# Patient Record
Sex: MALE | Race: WHITE | NOT HISPANIC OR LATINO | Employment: OTHER | URBAN - METROPOLITAN AREA
[De-identification: names, ages, dates, MRNs, and addresses within clinical notes are randomized per-mention and may not be internally consistent; named-entity substitution may affect disease eponyms.]

---

## 2017-03-29 DIAGNOSIS — E78.5 HYPERLIPIDEMIA, UNSPECIFIED HYPERLIPIDEMIA TYPE: Primary | ICD-10-CM

## 2017-04-05 ENCOUNTER — OFFICE VISIT (OUTPATIENT)
Dept: CARDIOLOGY | Facility: CLINIC | Age: 82
End: 2017-04-05
Payer: MEDICARE

## 2017-04-05 ENCOUNTER — CLINICAL SUPPORT (OUTPATIENT)
Dept: CARDIOLOGY | Facility: CLINIC | Age: 82
End: 2017-04-05
Payer: MEDICARE

## 2017-04-05 VITALS
BODY MASS INDEX: 23.35 KG/M2 | SYSTOLIC BLOOD PRESSURE: 154 MMHG | HEART RATE: 74 BPM | HEIGHT: 70 IN | DIASTOLIC BLOOD PRESSURE: 80 MMHG | WEIGHT: 163.13 LBS

## 2017-04-05 DIAGNOSIS — Z95.2 S/P AVR (AORTIC VALVE REPLACEMENT): Primary | ICD-10-CM

## 2017-04-05 DIAGNOSIS — E78.5 HYPERLIPIDEMIA, UNSPECIFIED HYPERLIPIDEMIA TYPE: ICD-10-CM

## 2017-04-05 DIAGNOSIS — E78.00 PURE HYPERCHOLESTEROLEMIA: ICD-10-CM

## 2017-04-05 PROCEDURE — 1159F MED LIST DOCD IN RCRD: CPT | Mod: S$GLB,,, | Performed by: INTERNAL MEDICINE

## 2017-04-05 PROCEDURE — 1157F ADVNC CARE PLAN IN RCRD: CPT | Mod: S$GLB,,, | Performed by: INTERNAL MEDICINE

## 2017-04-05 PROCEDURE — 99214 OFFICE O/P EST MOD 30 MIN: CPT | Mod: S$GLB,,, | Performed by: INTERNAL MEDICINE

## 2017-04-05 PROCEDURE — 93000 ELECTROCARDIOGRAM COMPLETE: CPT | Mod: S$GLB,,, | Performed by: NUCLEAR MEDICINE

## 2017-04-05 PROCEDURE — 99999 PR PBB SHADOW E&M-EST. PATIENT-LVL III: CPT | Mod: PBBFAC,,, | Performed by: INTERNAL MEDICINE

## 2017-04-05 PROCEDURE — 1160F RVW MEDS BY RX/DR IN RCRD: CPT | Mod: S$GLB,,, | Performed by: INTERNAL MEDICINE

## 2017-04-05 NOTE — MR AVS SNAPSHOT
[facility]  [id number] [facility] [address]  Phone: [phone]  Fax: [phone]                  Juan Castellanos   2017 10:40 AM   Office Visit    Description:  Male : 1932   Provider:  Dallin Bueno MD   Department:  [facility]           Reason for Visit     Numbness           Diagnoses this Visit        Comments    S/P AVR (aortic valve replacement)    -  Primary     Pure hypercholesterolemia                To Do List           Future Appointments        Provider Department Dept Phone    2017 9:45 AM Haley Spears OD [facility] - Ophthalmology [phone]      Goals (5 Years of Data)     None      Follow-Up and Disposition     Return in about 1 year (around 2018).      [facility] On Call     [facility] On Call Nurse Care Line -  Assistance  Unless otherwise directed by your provider, please contact [facility], our nurse care line that is available for  assistance.     Registered nurses in the [facility] provide: appointment scheduling, clinical advisement, health education, and other advisory services.  Call: [phone] (toll free)               Medications           Message regarding Medications     Verify the changes and/or additions to your medication regime listed below are the same as discussed with your clinician today.  If any of these changes or additions are incorrect, please notify your healthcare provider.             Verify that the below list of medications is an accurate representation of the medications you are currently taking.  If none reported, the list may be blank. If incorrect, please contact your healthcare provider. Carry this list with you in case of emergency.           Current Medications     ACETAMINOPHEN (TYLENOL ORAL) Take by mouth as needed.    aspirin (ECOTRIN) 81 MG EC tablet Take 81 mg by mouth once daily.    CHLORPHENIRAMINE MALEATE (CHLORTABS ORAL) Take by mouth once daily.     "cholecalciferol, vitamin D3, 1,000 unit capsule Take 1,000 Units by mouth 2 (two) times daily.    esomeprazole (NEXIUM) 40 MG capsule Take 40 mg by mouth before dinner.     fluticasone (FLONASE) 50 mcg/actuation nasal spray 1 spray by Each Nare route once daily.    hydrocortisone 2.5 % ointment Apply bid for 1-2 weeks    levothyroxine (SYNTHROID) 88 MCG tablet Take 100 mcg by mouth once daily.     MULTIVIT-IRON-MIN-FOLIC ACID 3,500-18-0.4 UNIT-MG-MG ORAL CHEW Take by mouth.    rosuvastatin (CRESTOR) 20 MG tablet Take 20 mg by mouth once daily.    tramadol (ULTRAM) 50 mg tablet Take 50 mg by mouth every 6 (six) hours as needed.    valacyclovir (VALTREX) 1000 MG tablet TAKE ONE TABLET BY MOUTH TWICE DAILY FOR 5 DAYS           Clinical Reference Information           Your Vitals Were     BP Pulse Height Weight BMI    154/80 (BP Location: Right arm) 74 5' 10" (1.778 m) 74 kg (163 lb 2.3 oz) 23.41 kg/m2      Blood Pressure          Most Recent Value    BP  (!)  154/80      Allergies as of 4/5/2017     Tomato (Solanum Lycopersicum)    Grape    [place]      Immunizations Administered on Date of Encounter - 4/5/2017     None      [facility] Sign-Up     Activating your [facility] account is as easy as 1-2-3!     1) Visit [email or web address], select Sign Up Now, enter this activation code and your date of birth, then select Next.  [email or web address]  Expires: 5/20/2017 11:00 AM      2) Create a username and password to use when you visit [facility] in the future and select a security question in case you lose your password and select Next.    3) Enter your e-mail address and click Sign Up!    Additional Information  If you have questions, please e-mail [email or web address] or call [phone] to talk to our [facility] staff. Remember, [facility] is NOT to be used for urgent needs. For medical emergencies, dial 911.         Language Assistance Services     ATTENTION: Language assistance services are available, free of charge. Please " call [phone].      ATENCIÓN: Si habla español, tiene a salamanca disposición servicios gratuitos de asistencia lingüística. Llame al [phone].     CHÚ Ý: N?u b?n nói Ti?ng Vi?t, có các d?ch v? h? tr? ngôn ng? mi?n phí dành cho b?n. G?i s? [phone].         [facility] complies with applicable Federal civil rights laws and does not discriminate on the basis of race, color, national origin, age, disability, or sex.

## 2017-11-13 ENCOUNTER — OFFICE VISIT (OUTPATIENT)
Dept: OPHTHALMOLOGY | Facility: CLINIC | Age: 82
End: 2017-11-13
Payer: MEDICARE

## 2017-11-13 DIAGNOSIS — H52.03 HYPEROPIA WITH ASTIGMATISM AND PRESBYOPIA, BILATERAL: ICD-10-CM

## 2017-11-13 DIAGNOSIS — Z13.5 SCREENING FOR GLAUCOMA: ICD-10-CM

## 2017-11-13 DIAGNOSIS — H52.4 HYPEROPIA WITH ASTIGMATISM AND PRESBYOPIA, BILATERAL: ICD-10-CM

## 2017-11-13 DIAGNOSIS — H52.203 HYPEROPIA WITH ASTIGMATISM AND PRESBYOPIA, BILATERAL: ICD-10-CM

## 2017-11-13 DIAGNOSIS — H04.123 DRY EYES, BILATERAL: ICD-10-CM

## 2017-11-13 PROCEDURE — 92014 COMPRE OPH EXAM EST PT 1/>: CPT | Mod: S$GLB,,, | Performed by: OPTOMETRIST

## 2017-11-13 PROCEDURE — 99999 PR PBB SHADOW E&M-EST. PATIENT-LVL II: CPT | Mod: PBBFAC,,, | Performed by: OPTOMETRIST

## 2017-11-13 PROCEDURE — 92015 DETERMINE REFRACTIVE STATE: CPT | Mod: S$GLB,,, | Performed by: OPTOMETRIST

## 2017-11-13 NOTE — PROGRESS NOTES
HPI     Eye Exam    Additional comments: yearly exam.            Comments   Last exam 11/07/2016 with slc. Wears glasses full time, last updated about   2 years ago. Uses allergy drops prn, almost every day. Pt states he has   dry skin and wonders if it can affect the moisture in his eyes    1. NS OU  2. hyperopia       Last edited by June Freed MA on 11/13/2017  9:36 AM. (History)            Assessment /Plan     For exam results, see Encounter Report.    Cataract, nuclear    Dry eyes, bilateral    Screening for glaucoma    Hyperopia with astigmatism and presbyopia, bilateral      Cataract(s) not yet affecting activities of daily living, therefore, surgery consult is not yet indicated.  Artificial tears as needed..  Glaucoma screening and fundus exam normal.  Updated glasses prescription.  Return to clinic 1 yr.

## 2018-06-19 DIAGNOSIS — E78.5 HYPERLIPIDEMIA, UNSPECIFIED HYPERLIPIDEMIA TYPE: Primary | ICD-10-CM

## 2018-06-20 ENCOUNTER — CLINICAL SUPPORT (OUTPATIENT)
Dept: CARDIOLOGY | Facility: CLINIC | Age: 83
End: 2018-06-20
Payer: MEDICARE

## 2018-06-20 ENCOUNTER — OFFICE VISIT (OUTPATIENT)
Dept: CARDIOLOGY | Facility: CLINIC | Age: 83
End: 2018-06-20
Payer: MEDICARE

## 2018-06-20 VITALS
DIASTOLIC BLOOD PRESSURE: 74 MMHG | WEIGHT: 163 LBS | HEART RATE: 68 BPM | HEIGHT: 70 IN | SYSTOLIC BLOOD PRESSURE: 190 MMHG | BODY MASS INDEX: 23.34 KG/M2

## 2018-06-20 DIAGNOSIS — E78.5 HYPERLIPIDEMIA, UNSPECIFIED HYPERLIPIDEMIA TYPE: ICD-10-CM

## 2018-06-20 DIAGNOSIS — Z95.2 S/P AVR (AORTIC VALVE REPLACEMENT): Primary | ICD-10-CM

## 2018-06-20 DIAGNOSIS — E78.00 PURE HYPERCHOLESTEROLEMIA: ICD-10-CM

## 2018-06-20 PROCEDURE — 99999 PR PBB SHADOW E&M-EST. PATIENT-LVL III: CPT | Mod: PBBFAC,,, | Performed by: PHYSICIAN ASSISTANT

## 2018-06-20 PROCEDURE — 99214 OFFICE O/P EST MOD 30 MIN: CPT | Mod: S$GLB,,, | Performed by: PHYSICIAN ASSISTANT

## 2018-06-20 PROCEDURE — 93000 ELECTROCARDIOGRAM COMPLETE: CPT | Mod: S$GLB,,, | Performed by: INTERNAL MEDICINE

## 2018-06-20 NOTE — PROGRESS NOTES
Subjective:    Patient ID:  Juan Castellanos is a 85 y.o. male who presents for follow-up of yearly follow-up      HPI   Mr. Castellanos is an 85 year old male patient with a PMHx of hypercholesterolemia and s/p AVR who presents today for his yearly follow-up. Patient returns today and states he is feeling well. Lost his wife in April and has been grieving. Cardiac wise, seems stable. Denies any SOB, chest pain, or other anginal signs or symptoms. No palpitations, lightheadedness, dizziness, near syncope, or syncope. No s/s of CHF. Patient reports compliance with his medications. Followed by PCP at [facility], who checks labs. BP elevated today however patient was stressed. EKG today shows NSR, normal EKG.    Review of Systems   Constitution: Negative for chills, decreased appetite, fever, weakness and malaise/fatigue.   HENT: Negative for congestion, hoarse voice and sore throat.    Eyes: Negative for blurred vision and discharge.   Cardiovascular: Negative for chest pain, claudication, cyanosis, dyspnea on exertion, irregular heartbeat, leg swelling, near-syncope, orthopnea, palpitations and paroxysmal nocturnal dyspnea.   Respiratory: Negative for cough, hemoptysis, shortness of breath, snoring, sputum production and wheezing.    Endocrine: Negative for cold intolerance and heat intolerance.   Hematologic/Lymphatic: Negative for bleeding problem. Does not bruise/bleed easily.   Skin: Negative for rash.   Musculoskeletal: Negative for arthritis, back pain, joint pain, joint swelling, muscle cramps, muscle weakness and myalgias.   Gastrointestinal: Negative for abdominal pain, constipation, diarrhea, heartburn, melena and nausea.   Genitourinary: Negative for hematuria.   Neurological: Negative for dizziness, focal weakness, headaches, light-headedness, loss of balance, numbness, paresthesias and seizures.   Psychiatric/Behavioral: Negative for memory loss. The patient does not have insomnia.    Allergic/Immunologic: Negative  "for hives.       BP (!) 190/74 (BP Method: Small (Manual))   Pulse 68   Ht 5' 10" (1.778 m)   Wt 73.9 kg (163 lb)   BMI 23.39 kg/m²     Objective:    Physical Exam   Constitutional: He is oriented to person, place, and time. He appears well-developed and well-nourished. No distress.   HENT:   Head: Normocephalic and atraumatic.   Eyes: Pupils are equal, round, and reactive to light. Right eye exhibits no discharge. Left eye exhibits no discharge.   Neck: Neck supple. No JVD present. No thyromegaly present.   Cardiovascular: Normal rate, regular rhythm, S1 normal and S2 normal.    Murmur heard.  Pulmonary/Chest: Effort normal and breath sounds normal. No respiratory distress. He has no wheezes. He has no rales.   Abdominal: Soft. Bowel sounds are normal. He exhibits no distension. There is no tenderness. There is no rebound.   Musculoskeletal: He exhibits no edema.   Neurological: He is alert and oriented to person, place, and time.   Skin: Skin is warm and dry. He is not diaphoretic. No erythema.   Psychiatric: He has a normal mood and affect. His behavior is normal. Thought content normal.   Nursing note and vitals reviewed.      Carotid U/S  CONCLUSIONS   There is 20 - 39% right Internal Carotid stenosis.  There is 20 - 39% left Internal Carotid stenosis.    2D Echo CONCLUSIONS     1 - Concentric remodeling.     2 - Normal left ventricular systolic function (EF 55-60%).     3 - Normal left ventricular diastolic function.     4 - Normal right ventricular systolic function .     5 - The estimated PA systolic pressure is 30 mmHg.     6 - Aortic valve prosthesis, effective prosthetic valve area corrected for BSA is 0.71 cm2.     7 - Trivial mitral regurgitation.     8 - Trivial tricuspid regurgitation.   Assessment:       1. S/P AVR (aortic valve replacement)    2. Pure hypercholesterolemia       Doing clinically well. No cardiac complaints. Stable, no angina, no s/s of CHF. BP elevated secondary to stress, he " will monitor at home and call me if it remains elevated.   Plan:     -Continue current medical management and risk factor modification  -Cardiac, low salt diet  -Continue active lifestyle  -PCP gets labs  -2d echo next 4-6 weeks  -RTC 1 year with Dr. Bueno with EKG    Chart reviewed. Dr. Bueno agrees with plan as outlined above.

## 2018-07-03 ENCOUNTER — PATIENT MESSAGE (OUTPATIENT)
Dept: CARDIOLOGY | Facility: CLINIC | Age: 83
End: 2018-07-03

## 2018-07-03 DIAGNOSIS — E78.00 PURE HYPERCHOLESTEROLEMIA: Primary | ICD-10-CM

## 2018-07-24 ENCOUNTER — CLINICAL SUPPORT (OUTPATIENT)
Dept: CARDIOLOGY | Facility: CLINIC | Age: 83
End: 2018-07-24
Attending: PHYSICIAN ASSISTANT
Payer: MEDICARE

## 2018-07-24 DIAGNOSIS — Z95.2 S/P AVR (AORTIC VALVE REPLACEMENT): ICD-10-CM

## 2018-07-24 LAB
ESTIMATED PA SYSTOLIC PRESSURE: 30.67
MITRAL VALVE MOBILITY: NORMAL
MITRAL VALVE REGURGITATION: NORMAL
RETIRED EF AND QEF - SEE NOTES: 60 (ref 55–65)
TRICUSPID VALVE REGURGITATION: NORMAL

## 2018-07-24 PROCEDURE — 93306 TTE W/DOPPLER COMPLETE: CPT | Mod: S$GLB,,, | Performed by: NUCLEAR MEDICINE

## 2018-07-25 ENCOUNTER — TELEPHONE (OUTPATIENT)
Dept: CARDIOLOGY | Facility: HOSPITAL | Age: 83
End: 2018-07-25

## 2018-09-10 ENCOUNTER — PATIENT MESSAGE (OUTPATIENT)
Dept: CARDIOLOGY | Facility: CLINIC | Age: 83
End: 2018-09-10

## 2018-09-11 ENCOUNTER — OFFICE VISIT (OUTPATIENT)
Dept: CARDIOLOGY | Facility: CLINIC | Age: 83
End: 2018-09-11
Payer: MEDICARE

## 2018-09-11 VITALS
DIASTOLIC BLOOD PRESSURE: 56 MMHG | SYSTOLIC BLOOD PRESSURE: 122 MMHG | WEIGHT: 162.5 LBS | HEART RATE: 68 BPM | HEIGHT: 70 IN | BODY MASS INDEX: 23.26 KG/M2

## 2018-09-11 DIAGNOSIS — Z95.2 S/P AVR (AORTIC VALVE REPLACEMENT): Primary | ICD-10-CM

## 2018-09-11 DIAGNOSIS — I50.32 CHRONIC DIASTOLIC HF (HEART FAILURE): ICD-10-CM

## 2018-09-11 DIAGNOSIS — E78.00 PURE HYPERCHOLESTEROLEMIA: ICD-10-CM

## 2018-09-11 PROCEDURE — 99213 OFFICE O/P EST LOW 20 MIN: CPT | Mod: PBBFAC | Performed by: NURSE PRACTITIONER

## 2018-09-11 PROCEDURE — 99214 OFFICE O/P EST MOD 30 MIN: CPT | Mod: S$PBB,,, | Performed by: NURSE PRACTITIONER

## 2018-09-11 PROCEDURE — 99999 PR PBB SHADOW E&M-EST. PATIENT-LVL III: CPT | Mod: PBBFAC,,, | Performed by: NURSE PRACTITIONER

## 2018-09-11 RX ORDER — HYDROCHLOROTHIAZIDE 12.5 MG/1
12.5 TABLET ORAL DAILY PRN
Qty: 30 TABLET | Refills: 6 | Status: SHIPPED | OUTPATIENT
Start: 2018-09-11 | End: 2023-06-23

## 2018-09-11 RX ORDER — AZELASTINE 1 MG/ML
1 SPRAY, METERED NASAL
COMMUNITY
Start: 2018-09-10 | End: 2023-06-23

## 2018-09-11 RX ORDER — FUROSEMIDE 20 MG/1
20 TABLET ORAL
COMMUNITY
Start: 2018-08-31 | End: 2018-10-09

## 2018-09-11 NOTE — PROGRESS NOTES
Subjective:    Patient ID:  Juan Castellanos is a 86 y.o. male who presents for follow-up of Leg Swelling and Dizziness      HPI   Mr. Castellanos is an 86 year old male with a PMHx of Hypercholesterolemia and s/p AVR who presents today for follow up. He returns today and states that he has been doing ok. He was seen by PCP a few weeks ago due to lower extremity swelling and received Rx for Lasix 20 mg daily. He took lasix x 7 days which resulted in 8 lb weight loss and reduction in leg edema but developed dizziness. He reports that he stopped taking lasix a few days ago and is feeling better. Denies chest pain or anginal equivalents. No shortness of breath, palpitations or JUAREZ. Minimal leg swelling on exam today. Denies syncope or near syncope. No further lightheadedness or dizziness since stopping Lasix. Denies any excess salt in diet, his daughter reports no chips with meals. No claudication pain. Denies any exertional symptoms. No signs of ADHF on exam today. Compliant with medications and dietary restrictions.     Review of Systems   Constitution: Negative for weakness.   HENT: Negative for hearing loss and hoarse voice.    Eyes: Negative for blurred vision and visual disturbance.   Cardiovascular: Positive for leg swelling. Negative for chest pain, claudication, dyspnea on exertion, irregular heartbeat, near-syncope, orthopnea, palpitations, paroxysmal nocturnal dyspnea and syncope.   Respiratory: Negative for cough, hemoptysis, shortness of breath, sleep disturbances due to breathing, snoring and wheezing.    Endocrine: Negative for cold intolerance and heat intolerance.   Hematologic/Lymphatic: Does not bruise/bleed easily.   Skin: Negative for color change, dry skin and nail changes.   Musculoskeletal: Positive for arthritis and back pain. Negative for joint pain and myalgias.   Gastrointestinal: Negative for bloating, abdominal pain, constipation, nausea and vomiting.   Genitourinary: Negative for dysuria, flank  "pain, hematuria and hesitancy.   Neurological: Positive for light-headedness (improved). Negative for headaches, loss of balance, numbness and paresthesias.   Psychiatric/Behavioral: Negative for altered mental status.   Allergic/Immunologic: Negative for environmental allergies.       BP (!) 122/56   Pulse 68   Ht 5' 10" (1.778 m)   Wt 73.7 kg (162 lb 7.7 oz)   BMI 23.31 kg/m²     Objective:    Physical Exam   Constitutional: He is oriented to person, place, and time. He appears well-developed and well-nourished. No distress.   HENT:   Head: Normocephalic and atraumatic.   Eyes: Pupils are equal, round, and reactive to light.   Neck: Normal range of motion and full passive range of motion without pain. Neck supple. No JVD present.   Cardiovascular: Normal rate, regular rhythm, S1 normal, S2 normal and intact distal pulses. PMI is not displaced. Exam reveals no distant heart sounds.   No murmur heard.  Pulses:       Radial pulses are 2+ on the right side, and 2+ on the left side.        Dorsalis pedis pulses are 2+ on the right side, and 2+ on the left side.   Pulmonary/Chest: Effort normal and breath sounds normal. No accessory muscle usage. No respiratory distress. He has no decreased breath sounds. He has no wheezes.   Abdominal: Soft. Bowel sounds are normal. He exhibits no distension. There is no tenderness.   Musculoskeletal: Normal range of motion. He exhibits edema (trace BLE).        Right ankle: He exhibits swelling.        Left ankle: He exhibits swelling.   Neurological: He is alert and oriented to person, place, and time.   Skin: Skin is warm and dry. He is not diaphoretic. No cyanosis. Nails show no clubbing.   Psychiatric: He has a normal mood and affect. His speech is normal and behavior is normal. Judgment and thought content normal. Cognition and memory are normal.   Nursing note and vitals reviewed.          Chemistry        Component Value Date/Time     12/30/2014 1118    K 4.4 " 12/30/2014 1118     12/30/2014 1118    CO2 28 12/30/2014 1118    BUN 16 12/30/2014 1118    CREATININE 1.3 12/30/2014 1118    GLU 92 12/30/2014 1118        Component Value Date/Time    CALCIUM 9.8 12/30/2014 1118    ALKPHOS 77 12/30/2014 1118    AST 21 12/30/2014 1118    ALT 11 12/30/2014 1118    BILITOT 0.3 12/30/2014 1118    ESTGFRAFRICA 59 (A) 12/30/2014 1118    EGFRNONAA 51 (A) 12/30/2014 1118          Assessment:       1. S/P AVR (aortic valve replacement)    2. Pure hypercholesterolemia    3. Chronic diastolic HF (heart failure)       Patient presents to clinic for routine follow up. He had issue with lower leg swelling and was started on daily lasix.   He had about 1 week of medication and developed dizziness. Leg swelling much improved today in office.   BP well controlled today in office as well despite being elevated at last visit.   Reports compliance with medications and diet.   No CNS complaints to suggest TIA or CVA today.  No signs of abnormal bleeding on ASA.   Plan:       May use HCTZ as needed for leg swelling  DASH diet, 2 gm sodium restriction compliance  1.5 L fluid restriction per day  Daily weights  IF increase in weight by 3 lbs in 1 day or 5 lbs in 1 week, call clinic.   Continue to monitor BP at home, IF BP >140/90 please call clinic.  Discussed with patient and daughter in detail the need for dietary compliance  Recommend compression sock use    Keep routine follow up in 1 year  Sooner if needed.

## 2018-10-08 ENCOUNTER — PATIENT MESSAGE (OUTPATIENT)
Dept: OPHTHALMOLOGY | Facility: CLINIC | Age: 83
End: 2018-10-08

## 2018-10-09 ENCOUNTER — OFFICE VISIT (OUTPATIENT)
Dept: OPHTHALMOLOGY | Facility: CLINIC | Age: 83
End: 2018-10-09
Payer: MEDICARE

## 2018-10-09 DIAGNOSIS — H04.123 DRY EYES, BILATERAL: Primary | ICD-10-CM

## 2018-10-09 PROBLEM — J30.9 ALLERGIC RHINITIS: Status: ACTIVE | Noted: 2018-10-09

## 2018-10-09 PROBLEM — M85.80 OSTEOPENIA: Status: ACTIVE | Noted: 2018-10-09

## 2018-10-09 PROCEDURE — 99999 PR PBB SHADOW E&M-EST. PATIENT-LVL II: CPT | Mod: PBBFAC,,, | Performed by: OPTOMETRIST

## 2018-10-09 PROCEDURE — 92012 INTRM OPH EXAM EST PATIENT: CPT | Mod: S$PBB,,, | Performed by: OPTOMETRIST

## 2018-10-09 PROCEDURE — 99212 OFFICE O/P EST SF 10 MIN: CPT | Mod: PBBFAC,PO | Performed by: OPTOMETRIST

## 2018-10-09 NOTE — PROGRESS NOTES
HPI     Eye Pain      Additional comments: OS              Comments     Last seen by [facility] on 11/13/17 for yearly eye exam  Patient here today for pain behind the left eye  Patient states pain has been present for about 2 weeks  Pain is rated at a 6 today.  Pain is associated with itching and irritation.  Using allergy relief or Visine PRN   1. NSC both eyes  *Intermittent discomfort at outer canthus left eye.  No crusting/matting   in a.m., no discharge.  Daughter has noticed occasional mild redness of   the left eye.  Uses allergy drops which gives relief for about 8 hours.  Both eyes burn   after about 1 hour of reading.  Patient has noticed decreased vision in   his left eye.          Last edited by Haley Spears, OD on 10/9/2018  3:30 PM. (History)            Assessment /Plan     For exam results, see Encounter Report.    Dry eyes, bilateral      Artificial tears tid/qid both eyes.  Patient may be having occasional episodes of marginal blepharitis.  Daughter will send message if it recurs and I'll prescribe an ointment to be used on outer canthus as needed..  Myopic shift secondary to nuclear sclerosis - updated prescription.  Return to clinic in 2 months for scheduled dilation/glaucoma testing.

## 2018-11-13 ENCOUNTER — OFFICE VISIT (OUTPATIENT)
Dept: OPHTHALMOLOGY | Facility: CLINIC | Age: 83
End: 2018-11-13
Payer: MEDICARE

## 2018-11-13 DIAGNOSIS — H04.123 DRY EYES, BILATERAL: ICD-10-CM

## 2018-11-13 DIAGNOSIS — H52.4 HYPEROPIA WITH ASTIGMATISM AND PRESBYOPIA, BILATERAL: ICD-10-CM

## 2018-11-13 DIAGNOSIS — H25.13 AGE-RELATED NUCLEAR CATARACT OF BOTH EYES: Primary | ICD-10-CM

## 2018-11-13 DIAGNOSIS — H52.203 HYPEROPIA WITH ASTIGMATISM AND PRESBYOPIA, BILATERAL: ICD-10-CM

## 2018-11-13 DIAGNOSIS — H52.03 HYPEROPIA WITH ASTIGMATISM AND PRESBYOPIA, BILATERAL: ICD-10-CM

## 2018-11-13 DIAGNOSIS — Z13.5 SCREENING FOR GLAUCOMA: ICD-10-CM

## 2018-11-13 PROCEDURE — 92015 DETERMINE REFRACTIVE STATE: CPT | Mod: S$GLB,,, | Performed by: OPTOMETRIST

## 2018-11-13 PROCEDURE — 99999 PR PBB SHADOW E&M-EST. PATIENT-LVL II: CPT | Mod: PBBFAC,,, | Performed by: OPTOMETRIST

## 2018-11-13 PROCEDURE — 92014 COMPRE OPH EXAM EST PT 1/>: CPT | Mod: S$GLB,,, | Performed by: OPTOMETRIST

## 2018-11-13 NOTE — PROGRESS NOTES
HPI     Eye Exam      Additional comments: Yearly              Comments     Last seen by [facility] on 10/9/18 for Dry eyes *managed well with twice daily   ATs  Last full eye exam was 11/13/17 with [facility]  Patient here today for yearly eye exam  No noticeable changes in vision since last eye exam  Wears Bifocal glasses full-time updated about 6 months ago  No other complaints  Using Artifical tears BID to help with dryness          Last edited by Haley Spears, OD on 11/13/2018 11:51 AM. (History)            Assessment /Plan     For exam results, see Encounter Report.    Age-related nuclear cataract of both eyes    Dry eyes, bilateral    Screening for glaucoma    Hyperopia with astigmatism and presbyopia, bilateral      Cataract(s) not yet affecting activities of daily living, therefore, surgery consult is not yet indicated.'  Continue artificial tears as needed..  Glaucoma screening and fundus exam normal.  Updated glasses prescription.  Return to clinic 1 yr.

## 2019-06-05 ENCOUNTER — LAB VISIT (OUTPATIENT)
Dept: LAB | Facility: HOSPITAL | Age: 84
End: 2019-06-05
Attending: INTERNAL MEDICINE
Payer: MEDICARE

## 2019-06-05 DIAGNOSIS — E78.00 PURE HYPERCHOLESTEROLEMIA: ICD-10-CM

## 2019-06-05 LAB
CHOLEST SERPL-MCNC: 157 MG/DL (ref 120–199)
CHOLEST/HDLC SERPL: 3.5 {RATIO} (ref 2–5)
HDLC SERPL-MCNC: 45 MG/DL (ref 40–75)
HDLC SERPL: 28.7 % (ref 20–50)
LDLC SERPL CALC-MCNC: 69.2 MG/DL (ref 63–159)
NONHDLC SERPL-MCNC: 112 MG/DL
TRIGL SERPL-MCNC: 214 MG/DL (ref 30–150)

## 2019-06-05 PROCEDURE — 36415 COLL VENOUS BLD VENIPUNCTURE: CPT

## 2019-06-05 PROCEDURE — 80061 LIPID PANEL: CPT

## 2019-06-06 ENCOUNTER — TELEPHONE (OUTPATIENT)
Dept: CARDIOLOGY | Facility: CLINIC | Age: 84
End: 2019-06-06

## 2019-10-07 ENCOUNTER — CLINICAL SUPPORT (OUTPATIENT)
Dept: CARDIOLOGY | Facility: CLINIC | Age: 84
End: 2019-10-07
Payer: MEDICARE

## 2019-10-07 ENCOUNTER — OFFICE VISIT (OUTPATIENT)
Dept: CARDIOLOGY | Facility: CLINIC | Age: 84
End: 2019-10-07
Payer: MEDICARE

## 2019-10-07 VITALS
WEIGHT: 159.63 LBS | DIASTOLIC BLOOD PRESSURE: 60 MMHG | SYSTOLIC BLOOD PRESSURE: 130 MMHG | BODY MASS INDEX: 22.9 KG/M2 | HEART RATE: 65 BPM

## 2019-10-07 DIAGNOSIS — E78.5 HYPERLIPIDEMIA, UNSPECIFIED HYPERLIPIDEMIA TYPE: Primary | ICD-10-CM

## 2019-10-07 DIAGNOSIS — Z95.2 S/P AVR (AORTIC VALVE REPLACEMENT): Primary | ICD-10-CM

## 2019-10-07 DIAGNOSIS — E78.5 HYPERLIPIDEMIA, UNSPECIFIED HYPERLIPIDEMIA TYPE: ICD-10-CM

## 2019-10-07 DIAGNOSIS — E78.00 PURE HYPERCHOLESTEROLEMIA: ICD-10-CM

## 2019-10-07 PROCEDURE — 93010 EKG 12-LEAD: ICD-10-PCS | Mod: S$GLB,,, | Performed by: NUCLEAR MEDICINE

## 2019-10-07 PROCEDURE — 99999 PR PBB SHADOW E&M-EST. PATIENT-LVL III: ICD-10-PCS | Mod: PBBFAC,,, | Performed by: INTERNAL MEDICINE

## 2019-10-07 PROCEDURE — 99214 PR OFFICE/OUTPT VISIT, EST, LEVL IV, 30-39 MIN: ICD-10-PCS | Mod: S$GLB,,, | Performed by: INTERNAL MEDICINE

## 2019-10-07 PROCEDURE — 99214 OFFICE O/P EST MOD 30 MIN: CPT | Mod: S$GLB,,, | Performed by: INTERNAL MEDICINE

## 2019-10-07 PROCEDURE — 93010 ELECTROCARDIOGRAM REPORT: CPT | Mod: S$GLB,,, | Performed by: NUCLEAR MEDICINE

## 2019-10-07 PROCEDURE — 93005 ELECTROCARDIOGRAM TRACING: CPT | Mod: S$GLB,,, | Performed by: INTERNAL MEDICINE

## 2019-10-07 PROCEDURE — 99999 PR PBB SHADOW E&M-EST. PATIENT-LVL III: CPT | Mod: PBBFAC,,, | Performed by: INTERNAL MEDICINE

## 2019-10-07 PROCEDURE — 93005 EKG 12-LEAD: ICD-10-PCS | Mod: S$GLB,,, | Performed by: INTERNAL MEDICINE

## 2019-10-07 NOTE — PROGRESS NOTES
Subjective:   Patient ID:  Juan Castellanos is a 87 y.o. male who presents for follow-up of Hyperlipidemia (f/u visit)  echo-nml AVR, nml lv function.Patient denies CP, angina or anginal equivalent.    Hyperlipidemia   This is a chronic problem. The current episode started more than 1 year ago. Recent lipid tests were reviewed and are normal. Pertinent negatives include no chest pain or shortness of breath. Current antihyperlipidemic treatment includes statins. The current treatment provides moderate improvement of lipids. There are no compliance problems.        Review of Systems   Constitution: Negative. Negative for weight gain.   HENT: Negative.    Eyes: Negative.    Cardiovascular: Negative.  Negative for chest pain, leg swelling and palpitations.   Respiratory: Negative.  Negative for shortness of breath.    Endocrine: Negative.    Hematologic/Lymphatic: Negative.    Skin: Negative.    Musculoskeletal: Negative for muscle weakness.   Gastrointestinal: Negative.    Genitourinary: Negative.    Neurological: Negative.  Negative for dizziness.   Psychiatric/Behavioral: Negative.    Allergic/Immunologic: Negative.      Family History   Problem Relation Age of Onset    Cataracts Father     Diabetes Brother      Past Medical History:   Diagnosis Date    Allergy     Anxiety     Arthritis     Blood transfusion     Cataract     Depression     GERD (gastroesophageal reflux disease)     Heart murmur     Hypercholesterolemia     Prostate cancer     Thyroid disease      Social History     Socioeconomic History    Marital status:      Spouse name: Not on file    Number of children: Not on file    Years of education: Not on file    Highest education level: Not on file   Occupational History    Not on file   Social Needs    Financial resource strain: Not on file    Food insecurity:     Worry: Not on file     Inability: Not on file    Transportation needs:     Medical: Not on file     Non-medical: Not  on file   Tobacco Use    Smoking status: Former Smoker    Tobacco comment: quit 40 years ago   Substance and Sexual Activity    Alcohol use: No    Drug use: No    Sexual activity: Not on file   Lifestyle    Physical activity:     Days per week: Not on file     Minutes per session: Not on file    Stress: Not on file   Relationships    Social connections:     Talks on phone: Not on file     Gets together: Not on file     Attends Bahai service: Not on file     Active member of club or organization: Not on file     Attends meetings of clubs or organizations: Not on file     Relationship status: Not on file   Other Topics Concern    Not on file   Social History Narrative    Not on file     Current Outpatient Medications on File Prior to Visit   Medication Sig Dispense Refill    ACETAMINOPHEN (TYLENOL ORAL) Take by mouth as needed.      aspirin (ECOTRIN) 81 MG EC tablet Take 81 mg by mouth once daily.      CHLORPHENIRAMINE MALEATE (CHLORTABS ORAL) Take by mouth once daily.      cholecalciferol, vitamin D3, 1,000 unit capsule Take 1,000 Units by mouth 2 (two) times daily.      esomeprazole (NEXIUM) 40 MG capsule Take 40 mg by mouth before dinner.       fluticasone (FLONASE) 50 mcg/actuation nasal spray 1 spray by Each Nare route once daily.      hydroCHLOROthiazide (HYDRODIURIL) 12.5 MG Tab Take 1 tablet (12.5 mg total) by mouth daily as needed. 30 tablet 6    levothyroxine (SYNTHROID) 88 MCG tablet Take 100 mcg by mouth once daily.       MULTIVIT-IRON-MIN-FOLIC ACID 3,500-18-0.4 UNIT-MG-MG ORAL CHEW Take by mouth.      rosuvastatin (CRESTOR) 20 MG tablet Take 20 mg by mouth once daily.      tramadol (ULTRAM) 50 mg tablet Take 50 mg by mouth every 6 (six) hours as needed.      azelastine (ASTELIN) 137 mcg (0.1 %) nasal spray 1 spray by Nasal route.      hydrocortisone 2.5 % ointment Apply bid for 1-2 weeks       No current facility-administered medications on file prior to visit.      Review of  patient's allergies indicates:   Allergen Reactions    Tomato (solanum lycopersicum)     Grape Rash    [place]a Rash       Objective:     Physical Exam   Constitutional: He is oriented to person, place, and time. He appears well-developed and well-nourished.   HENT:   Head: Normocephalic and atraumatic.   Eyes: Pupils are equal, round, and reactive to light. Conjunctivae are normal.   Neck: Normal range of motion. Neck supple.   Cardiovascular: Normal rate, regular rhythm, normal heart sounds and intact distal pulses.   Pulmonary/Chest: Effort normal and breath sounds normal.   Abdominal: Soft. Bowel sounds are normal.   Neurological: He is alert and oriented to person, place, and time.   Skin: Skin is warm and dry.   Psychiatric: He has a normal mood and affect.   Nursing note and vitals reviewed.      Assessment:     1. S/P AVR (aortic valve replacement)    2. Pure hypercholesterolemia        Plan:     S/P AVR (aortic valve replacement)    Pure hypercholesterolemia      Continue asa- primary prvention  Continue statin-hlp

## 2020-10-12 ENCOUNTER — OFFICE VISIT (OUTPATIENT)
Dept: CARDIOLOGY | Facility: CLINIC | Age: 85
End: 2020-10-12
Payer: MEDICARE

## 2020-10-12 VITALS
SYSTOLIC BLOOD PRESSURE: 120 MMHG | BODY MASS INDEX: 21.11 KG/M2 | HEIGHT: 70 IN | OXYGEN SATURATION: 96 % | DIASTOLIC BLOOD PRESSURE: 62 MMHG | WEIGHT: 147.5 LBS | HEART RATE: 61 BPM

## 2020-10-12 DIAGNOSIS — Z95.2 S/P AVR (AORTIC VALVE REPLACEMENT): Primary | ICD-10-CM

## 2020-10-12 DIAGNOSIS — E78.00 PURE HYPERCHOLESTEROLEMIA: ICD-10-CM

## 2020-10-12 PROCEDURE — 99214 OFFICE O/P EST MOD 30 MIN: CPT | Mod: S$GLB,,, | Performed by: INTERNAL MEDICINE

## 2020-10-12 PROCEDURE — 99214 PR OFFICE/OUTPT VISIT, EST, LEVL IV, 30-39 MIN: ICD-10-PCS | Mod: S$GLB,,, | Performed by: INTERNAL MEDICINE

## 2020-10-12 PROCEDURE — 1159F MED LIST DOCD IN RCRD: CPT | Mod: S$GLB,,, | Performed by: INTERNAL MEDICINE

## 2020-10-12 PROCEDURE — 99999 PR PBB SHADOW E&M-EST. PATIENT-LVL III: CPT | Mod: PBBFAC,,, | Performed by: INTERNAL MEDICINE

## 2020-10-12 PROCEDURE — 1126F AMNT PAIN NOTED NONE PRSNT: CPT | Mod: S$GLB,,, | Performed by: INTERNAL MEDICINE

## 2020-10-12 PROCEDURE — 99999 PR PBB SHADOW E&M-EST. PATIENT-LVL III: ICD-10-PCS | Mod: PBBFAC,,, | Performed by: INTERNAL MEDICINE

## 2020-10-12 PROCEDURE — 1159F PR MEDICATION LIST DOCUMENTED IN MEDICAL RECORD: ICD-10-PCS | Mod: S$GLB,,, | Performed by: INTERNAL MEDICINE

## 2020-10-12 PROCEDURE — 1126F PR PAIN SEVERITY QUANTIFIED, NO PAIN PRESENT: ICD-10-PCS | Mod: S$GLB,,, | Performed by: INTERNAL MEDICINE

## 2020-10-12 RX ORDER — LOSARTAN POTASSIUM 25 MG/1
25 TABLET ORAL DAILY
Status: ON HOLD | COMMUNITY
Start: 2020-08-19 | End: 2023-06-24 | Stop reason: HOSPADM

## 2020-10-12 RX ORDER — FERROUS GLUCONATE 324(38)MG
324 TABLET ORAL
COMMUNITY

## 2020-10-12 NOTE — PROGRESS NOTES
Subjective:   Patient ID:  Juan Castellanos is a 88 y.o. male who presents for follow-up of No chief complaint on file.  Patient denies CP, angina or anginal equivalent.Pt active without sx.    Hyperlipidemia  This is a chronic problem. The current episode started more than 1 year ago. The problem is controlled. Pertinent negatives include no chest pain or shortness of breath. Current antihyperlipidemic treatment includes statins. The current treatment provides moderate improvement of lipids. There are no compliance problems.    Hypertension  This is a chronic problem. The current episode started more than 1 year ago. The problem has been gradually improving since onset. The problem is controlled. Pertinent negatives include no chest pain, palpitations or shortness of breath. Past treatments include angiotensin blockers and diuretics. The current treatment provides moderate improvement. There are no compliance problems.        Review of Systems   Constitution: Negative. Negative for weight gain.   HENT: Negative.    Eyes: Negative.    Cardiovascular: Negative.  Negative for chest pain, leg swelling and palpitations.   Respiratory: Negative.  Negative for shortness of breath.    Endocrine: Negative.    Hematologic/Lymphatic: Negative.    Skin: Negative.    Musculoskeletal: Negative for muscle weakness.   Gastrointestinal: Negative.    Genitourinary: Negative.    Neurological: Negative.  Negative for dizziness.   Psychiatric/Behavioral: Negative.    Allergic/Immunologic: Negative.      Family History   Problem Relation Age of Onset    Cataracts Father     Diabetes Brother      Past Medical History:   Diagnosis Date    Allergy     Anxiety     Arthritis     Blood transfusion     Cataract     Depression     GERD (gastroesophageal reflux disease)     Heart murmur     Hypercholesterolemia     Prostate cancer     Thyroid disease      Social History     Socioeconomic History    Marital status:      Spouse  name: Not on file    Number of children: Not on file    Years of education: Not on file    Highest education level: Not on file   Occupational History    Not on file   Social Needs    Financial resource strain: Not on file    Food insecurity     Worry: Not on file     Inability: Not on file    Transportation needs     Medical: Not on file     Non-medical: Not on file   Tobacco Use    Smoking status: Former Smoker    Tobacco comment: quit 40 years ago   Substance and Sexual Activity    Alcohol use: No    Drug use: No    Sexual activity: Not on file   Lifestyle    Physical activity     Days per week: Not on file     Minutes per session: Not on file    Stress: Not on file   Relationships    Social connections     Talks on phone: Not on file     Gets together: Not on file     Attends Hoahaoism service: Not on file     Active member of club or organization: Not on file     Attends meetings of clubs or organizations: Not on file     Relationship status: Not on file   Other Topics Concern    Not on file   Social History Narrative    Not on file     Current Outpatient Medications on File Prior to Visit   Medication Sig Dispense Refill    ACETAMINOPHEN (TYLENOL ORAL) Take by mouth as needed.      aspirin (ECOTRIN) 81 MG EC tablet Take 81 mg by mouth once daily.      azelastine (ASTELIN) 137 mcg (0.1 %) nasal spray 1 spray by Nasal route.      CHLORPHENIRAMINE MALEATE (CHLORTABS ORAL) Take by mouth once daily.      cholecalciferol, vitamin D3, 1,000 unit capsule Take 1,000 Units by mouth 2 (two) times daily.      esomeprazole (NEXIUM) 40 MG capsule Take 40 mg by mouth before dinner.       fluticasone (FLONASE) 50 mcg/actuation nasal spray 1 spray by Each Nare route once daily.      hydroCHLOROthiazide (HYDRODIURIL) 12.5 MG Tab Take 1 tablet (12.5 mg total) by mouth daily as needed. 30 tablet 6    hydrocortisone 2.5 % ointment Apply bid for 1-2 weeks      levothyroxine (SYNTHROID) 88 MCG tablet Take  100 mcg by mouth once daily.       MULTIVIT-IRON-MIN-FOLIC ACID 3,500-18-0.4 UNIT-MG-MG ORAL CHEW Take by mouth.      rosuvastatin (CRESTOR) 20 MG tablet Take 20 mg by mouth once daily.      tramadol (ULTRAM) 50 mg tablet Take 50 mg by mouth every 6 (six) hours as needed.       No current facility-administered medications on file prior to visit.      Review of patient's allergies indicates:   Allergen Reactions    Tomato (solanum lycopersicum)     Grape Rash    [place] Rash       Objective:     Physical Exam   Constitutional: He is oriented to person, place, and time. He appears well-developed and well-nourished.   HENT:   Head: Normocephalic and atraumatic.   Eyes: Pupils are equal, round, and reactive to light. Conjunctivae are normal.   Neck: Normal range of motion. Neck supple.   Cardiovascular: Normal rate, regular rhythm, normal heart sounds and intact distal pulses.   Pulmonary/Chest: Effort normal and breath sounds normal.   Abdominal: Soft. Bowel sounds are normal.   Neurological: He is alert and oriented to person, place, and time.   Skin: Skin is warm and dry.   Psychiatric: He has a normal mood and affect.   Nursing note and vitals reviewed.      Assessment:     1. S/P AVR (aortic valve replacement)    2. Pure hypercholesterolemia        Plan:     S/P AVR (aortic valve replacement)    Pure hypercholesterolemia    Continue asa- primary prvention  Continue statin-hlp  Continue losartan, hctz-htn

## 2021-10-15 ENCOUNTER — OFFICE VISIT (OUTPATIENT)
Dept: CARDIOLOGY | Facility: CLINIC | Age: 86
End: 2021-10-15
Payer: MEDICARE

## 2021-10-15 VITALS
HEIGHT: 70 IN | DIASTOLIC BLOOD PRESSURE: 68 MMHG | BODY MASS INDEX: 22.44 KG/M2 | OXYGEN SATURATION: 97 % | SYSTOLIC BLOOD PRESSURE: 152 MMHG | WEIGHT: 156.75 LBS | HEART RATE: 70 BPM

## 2021-10-15 DIAGNOSIS — Z95.2 S/P AVR (AORTIC VALVE REPLACEMENT): Primary | ICD-10-CM

## 2021-10-15 DIAGNOSIS — E78.00 PURE HYPERCHOLESTEROLEMIA: ICD-10-CM

## 2021-10-15 PROCEDURE — 1160F RVW MEDS BY RX/DR IN RCRD: CPT | Mod: CPTII,S$GLB,, | Performed by: INTERNAL MEDICINE

## 2021-10-15 PROCEDURE — 1126F PR PAIN SEVERITY QUANTIFIED, NO PAIN PRESENT: ICD-10-PCS | Mod: CPTII,S$GLB,, | Performed by: INTERNAL MEDICINE

## 2021-10-15 PROCEDURE — 99999 PR PBB SHADOW E&M-EST. PATIENT-LVL IV: ICD-10-PCS | Mod: PBBFAC,,, | Performed by: INTERNAL MEDICINE

## 2021-10-15 PROCEDURE — 3288F PR FALLS RISK ASSESSMENT DOCUMENTED: ICD-10-PCS | Mod: CPTII,S$GLB,, | Performed by: INTERNAL MEDICINE

## 2021-10-15 PROCEDURE — 1159F MED LIST DOCD IN RCRD: CPT | Mod: CPTII,S$GLB,, | Performed by: INTERNAL MEDICINE

## 2021-10-15 PROCEDURE — 1126F AMNT PAIN NOTED NONE PRSNT: CPT | Mod: CPTII,S$GLB,, | Performed by: INTERNAL MEDICINE

## 2021-10-15 PROCEDURE — 1160F PR REVIEW ALL MEDS BY PRESCRIBER/CLIN PHARMACIST DOCUMENTED: ICD-10-PCS | Mod: CPTII,S$GLB,, | Performed by: INTERNAL MEDICINE

## 2021-10-15 PROCEDURE — 1101F PT FALLS ASSESS-DOCD LE1/YR: CPT | Mod: CPTII,S$GLB,, | Performed by: INTERNAL MEDICINE

## 2021-10-15 PROCEDURE — 99214 PR OFFICE/OUTPT VISIT, EST, LEVL IV, 30-39 MIN: ICD-10-PCS | Mod: S$GLB,,, | Performed by: INTERNAL MEDICINE

## 2021-10-15 PROCEDURE — 1101F PR PT FALLS ASSESS DOC 0-1 FALLS W/OUT INJ PAST YR: ICD-10-PCS | Mod: CPTII,S$GLB,, | Performed by: INTERNAL MEDICINE

## 2021-10-15 PROCEDURE — 99999 PR PBB SHADOW E&M-EST. PATIENT-LVL IV: CPT | Mod: PBBFAC,,, | Performed by: INTERNAL MEDICINE

## 2021-10-15 PROCEDURE — 3288F FALL RISK ASSESSMENT DOCD: CPT | Mod: CPTII,S$GLB,, | Performed by: INTERNAL MEDICINE

## 2021-10-15 PROCEDURE — 99214 OFFICE O/P EST MOD 30 MIN: CPT | Mod: S$GLB,,, | Performed by: INTERNAL MEDICINE

## 2021-10-15 PROCEDURE — 1159F PR MEDICATION LIST DOCUMENTED IN MEDICAL RECORD: ICD-10-PCS | Mod: CPTII,S$GLB,, | Performed by: INTERNAL MEDICINE

## 2021-11-24 ENCOUNTER — OFFICE VISIT (OUTPATIENT)
Dept: CARDIOLOGY | Facility: CLINIC | Age: 86
End: 2021-11-24
Payer: MEDICARE

## 2021-11-24 ENCOUNTER — LAB VISIT (OUTPATIENT)
Dept: LAB | Facility: HOSPITAL | Age: 86
End: 2021-11-24
Attending: INTERNAL MEDICINE
Payer: MEDICARE

## 2021-11-24 VITALS
HEART RATE: 76 BPM | WEIGHT: 157.75 LBS | DIASTOLIC BLOOD PRESSURE: 58 MMHG | OXYGEN SATURATION: 98 % | SYSTOLIC BLOOD PRESSURE: 122 MMHG | BODY MASS INDEX: 22.63 KG/M2

## 2021-11-24 DIAGNOSIS — R00.2 PALPITATION: ICD-10-CM

## 2021-11-24 DIAGNOSIS — Z95.2 S/P AVR (AORTIC VALVE REPLACEMENT): ICD-10-CM

## 2021-11-24 DIAGNOSIS — Z95.2 S/P AVR (AORTIC VALVE REPLACEMENT): Primary | ICD-10-CM

## 2021-11-24 DIAGNOSIS — E78.00 PURE HYPERCHOLESTEROLEMIA: ICD-10-CM

## 2021-11-24 DIAGNOSIS — I10 PRIMARY HYPERTENSION: ICD-10-CM

## 2021-11-24 DIAGNOSIS — R07.89 CHEST DISCOMFORT: Primary | ICD-10-CM

## 2021-11-24 PROCEDURE — 84484 ASSAY OF TROPONIN QUANT: CPT | Performed by: INTERNAL MEDICINE

## 2021-11-24 PROCEDURE — 99999 PR PBB SHADOW E&M-EST. PATIENT-LVL III: CPT | Mod: PBBFAC,,, | Performed by: INTERNAL MEDICINE

## 2021-11-24 PROCEDURE — 99214 OFFICE O/P EST MOD 30 MIN: CPT | Mod: S$GLB,,, | Performed by: INTERNAL MEDICINE

## 2021-11-24 PROCEDURE — 99999 PR PBB SHADOW E&M-EST. PATIENT-LVL III: ICD-10-PCS | Mod: PBBFAC,,, | Performed by: INTERNAL MEDICINE

## 2021-11-24 PROCEDURE — 36415 COLL VENOUS BLD VENIPUNCTURE: CPT | Mod: PO | Performed by: INTERNAL MEDICINE

## 2021-11-24 PROCEDURE — 99214 PR OFFICE/OUTPT VISIT, EST, LEVL IV, 30-39 MIN: ICD-10-PCS | Mod: S$GLB,,, | Performed by: INTERNAL MEDICINE

## 2021-11-25 LAB — TROPONIN I SERPL DL<=0.01 NG/ML-MCNC: 0.01 NG/ML (ref 0–0.03)

## 2021-11-26 ENCOUNTER — TELEPHONE (OUTPATIENT)
Dept: CARDIOLOGY | Facility: CLINIC | Age: 86
End: 2021-11-26
Payer: MEDICARE

## 2021-11-29 ENCOUNTER — HOSPITAL ENCOUNTER (OUTPATIENT)
Dept: CARDIOLOGY | Facility: HOSPITAL | Age: 86
Discharge: HOME | End: 2021-11-29
Attending: INTERNAL MEDICINE
Payer: MEDICARE

## 2021-11-29 VITALS
BODY MASS INDEX: 22.33 KG/M2 | HEIGHT: 70 IN | WEIGHT: 156 LBS | HEART RATE: 58 BPM | DIASTOLIC BLOOD PRESSURE: 58 MMHG | SYSTOLIC BLOOD PRESSURE: 122 MMHG

## 2021-11-29 DIAGNOSIS — R00.2 PALPITATION: ICD-10-CM

## 2021-11-29 DIAGNOSIS — Z95.2 S/P AVR (AORTIC VALVE REPLACEMENT): ICD-10-CM

## 2021-11-29 LAB
AORTIC ROOT ANNULUS: 2.51 CM
AV INDEX (PROSTH): 0.38
AV MEAN GRADIENT: 15 MMHG
AV PEAK GRADIENT: 27 MMHG
AV VALVE AREA: 0.94 CM2
AV VELOCITY RATIO: 0.33
BSA FOR ECHO PROCEDURE: 1.87 M2
CV ECHO LV RWT: 0.55 CM
DOP CALC AO PEAK VEL: 2.6 M/S
DOP CALC AO VTI: 66.4 CM
DOP CALC LVOT AREA: 2.5 CM2
DOP CALC LVOT DIAMETER: 1.78 CM
DOP CALC LVOT PEAK VEL: 0.85 M/S
DOP CALC LVOT STROKE VOLUME: 62.18 CM3
DOP CALC RVOT PEAK VEL: 0.65 M/S
DOP CALC RVOT VTI: 15.7 CM
DOP CALCLVOT PEAK VEL VTI: 25 CM
E WAVE DECELERATION TIME: 317.28 MSEC
E/A RATIO: 1.06
E/E' RATIO: 15.11 M/S
ECHO EF ESTIMATED: 66 %
ECHO LV POSTERIOR WALL: 1.08 CM (ref 0.6–1.1)
EJECTION FRACTION: 60 %
FRACTIONAL SHORTENING: 36 % (ref 28–44)
INTERVENTRICULAR SEPTUM: 1.04 CM (ref 0.6–1.1)
IVC DIAMETER: 1.84 CM
IVRT: 91.34 MSEC
LA MAJOR: 6.13 CM
LA MINOR: 6.18 CM
LA WIDTH: 4.41 CM
LEFT ATRIUM SIZE: 4.8 CM
LEFT ATRIUM VOLUME INDEX MOD: 19.8 ML/M2
LEFT ATRIUM VOLUME INDEX: 58.9 ML/M2
LEFT ATRIUM VOLUME MOD: 37.3 CM3
LEFT ATRIUM VOLUME: 110.74 CM3
LEFT INTERNAL DIMENSION IN SYSTOLE: 2.54 CM (ref 2.1–4)
LEFT VENTRICLE DIASTOLIC VOLUME INDEX: 36.29 ML/M2
LEFT VENTRICLE DIASTOLIC VOLUME: 68.23 ML
LEFT VENTRICLE MASS INDEX: 72 G/M2
LEFT VENTRICLE SYSTOLIC VOLUME INDEX: 12.3 ML/M2
LEFT VENTRICLE SYSTOLIC VOLUME: 23.21 ML
LEFT VENTRICULAR INTERNAL DIMENSION IN DIASTOLE: 3.96 CM (ref 3.5–6)
LEFT VENTRICULAR MASS: 135.93 G
LV LATERAL E/E' RATIO: 12.36 M/S
LV SEPTAL E/E' RATIO: 19.43 M/S
LVOT MG: 1.69 MMHG
LVOT MV: 0.62 CM/S
MV PEAK A VEL: 1.28 M/S
MV PEAK E VEL: 1.36 M/S
PISA TR MAX VEL: 2.82 M/S
PULM VEIN S/D RATIO: 1.06
PV MEAN GRADIENT: 0.95 MMHG
PV PEAK D VEL: 0.58 M/S
PV PEAK S VEL: 0.62 M/S
PV PEAK VELOCITY: 1.55 CM/S
RA MAJOR: 5.16 CM
RA PRESSURE: 8 MMHG
RA WIDTH: 3.74 CM
RIGHT VENTRICULAR END-DIASTOLIC DIMENSION: 4.55 CM
SINUS: 3.11 CM
STJ: 3.1 CM
TDI LATERAL: 0.11 M/S
TDI SEPTAL: 0.07 M/S
TDI: 0.09 M/S
TR MAX PG: 32 MMHG
TRICUSPID ANNULAR PLANE SYSTOLIC EXCURSION: 1.62 CM
TV REST PULMONARY ARTERY PRESSURE: 40 MMHG

## 2021-11-29 PROCEDURE — 93306 TTE W/DOPPLER COMPLETE: CPT | Mod: 26,,, | Performed by: INTERNAL MEDICINE

## 2021-11-29 PROCEDURE — 93306 TTE W/DOPPLER COMPLETE: CPT

## 2021-11-29 PROCEDURE — 93306 ECHO (CUPID ONLY): ICD-10-PCS | Mod: 26,,, | Performed by: INTERNAL MEDICINE

## 2021-12-01 ENCOUNTER — TELEPHONE (OUTPATIENT)
Dept: CARDIOLOGY | Facility: CLINIC | Age: 86
End: 2021-12-01
Payer: MEDICARE

## 2022-10-17 ENCOUNTER — OFFICE VISIT (OUTPATIENT)
Dept: CARDIOLOGY | Facility: CLINIC | Age: 87
End: 2022-10-17
Payer: MEDICARE

## 2022-10-17 VITALS
OXYGEN SATURATION: 98 % | HEART RATE: 62 BPM | DIASTOLIC BLOOD PRESSURE: 60 MMHG | WEIGHT: 144.63 LBS | BODY MASS INDEX: 20.7 KG/M2 | SYSTOLIC BLOOD PRESSURE: 130 MMHG | HEIGHT: 70 IN

## 2022-10-17 DIAGNOSIS — I10 PRIMARY HYPERTENSION: ICD-10-CM

## 2022-10-17 DIAGNOSIS — E78.00 PURE HYPERCHOLESTEROLEMIA: ICD-10-CM

## 2022-10-17 DIAGNOSIS — Z95.2 S/P AVR (AORTIC VALVE REPLACEMENT): Primary | ICD-10-CM

## 2022-10-17 PROCEDURE — 1126F PR PAIN SEVERITY QUANTIFIED, NO PAIN PRESENT: ICD-10-PCS | Mod: CPTII,S$GLB,, | Performed by: INTERNAL MEDICINE

## 2022-10-17 PROCEDURE — 99214 OFFICE O/P EST MOD 30 MIN: CPT | Mod: S$GLB,,, | Performed by: INTERNAL MEDICINE

## 2022-10-17 PROCEDURE — 1159F PR MEDICATION LIST DOCUMENTED IN MEDICAL RECORD: ICD-10-PCS | Mod: CPTII,S$GLB,, | Performed by: INTERNAL MEDICINE

## 2022-10-17 PROCEDURE — 99999 PR PBB SHADOW E&M-EST. PATIENT-LVL IV: CPT | Mod: PBBFAC,,, | Performed by: INTERNAL MEDICINE

## 2022-10-17 PROCEDURE — 3288F PR FALLS RISK ASSESSMENT DOCUMENTED: ICD-10-PCS | Mod: CPTII,S$GLB,, | Performed by: INTERNAL MEDICINE

## 2022-10-17 PROCEDURE — 99999 PR PBB SHADOW E&M-EST. PATIENT-LVL IV: ICD-10-PCS | Mod: PBBFAC,,, | Performed by: INTERNAL MEDICINE

## 2022-10-17 PROCEDURE — 1101F PT FALLS ASSESS-DOCD LE1/YR: CPT | Mod: CPTII,S$GLB,, | Performed by: INTERNAL MEDICINE

## 2022-10-17 PROCEDURE — 1101F PR PT FALLS ASSESS DOC 0-1 FALLS W/OUT INJ PAST YR: ICD-10-PCS | Mod: CPTII,S$GLB,, | Performed by: INTERNAL MEDICINE

## 2022-10-17 PROCEDURE — 99214 PR OFFICE/OUTPT VISIT, EST, LEVL IV, 30-39 MIN: ICD-10-PCS | Mod: S$GLB,,, | Performed by: INTERNAL MEDICINE

## 2022-10-17 PROCEDURE — 1159F MED LIST DOCD IN RCRD: CPT | Mod: CPTII,S$GLB,, | Performed by: INTERNAL MEDICINE

## 2022-10-17 PROCEDURE — 3288F FALL RISK ASSESSMENT DOCD: CPT | Mod: CPTII,S$GLB,, | Performed by: INTERNAL MEDICINE

## 2022-10-17 PROCEDURE — 1126F AMNT PAIN NOTED NONE PRSNT: CPT | Mod: CPTII,S$GLB,, | Performed by: INTERNAL MEDICINE

## 2022-10-17 RX ORDER — LEVOTHYROXINE SODIUM 112 UG/1
1 TABLET ORAL EVERY MORNING
COMMUNITY
Start: 2022-09-12

## 2022-10-17 NOTE — PROGRESS NOTES
Subjective:   Patient ID:  Juan Castellanos is a 89 y.o. male who presents for follow-up of Follow-up  Patient denies CP, angina or anginal equivalent.     Hyperlipidemia  This is a chronic problem. The current episode started more than 1 year ago. The problem is controlled. Pertinent negatives include no chest pain or shortness of breath. Current antihyperlipidemic treatment includes statins. The current treatment provides moderate improvement of lipids. There are no compliance problems.    Hypertension  This is a chronic problem. The current episode started more than 1 year ago. The problem has been gradually improving since onset. The problem is controlled. Pertinent negatives include no chest pain, palpitations or shortness of breath. Past treatments include angiotensin blockers and diuretics. The current treatment provides moderate improvement. There are no compliance problems.      Review of Systems   Constitutional: Negative. Negative for weight gain.   HENT: Negative.     Eyes: Negative.    Cardiovascular: Negative.  Negative for chest pain, leg swelling and palpitations.   Respiratory: Negative.  Negative for shortness of breath.    Endocrine: Negative.    Hematologic/Lymphatic: Negative.    Skin: Negative.    Musculoskeletal:  Negative for muscle weakness.   Gastrointestinal: Negative.    Genitourinary: Negative.    Neurological: Negative.  Negative for dizziness.   Psychiatric/Behavioral: Negative.     Allergic/Immunologic: Negative.    All other systems reviewed and are negative.  Family History   Problem Relation Age of Onset    Cataracts Father     Diabetes Brother      Past Medical History:   Diagnosis Date    Allergy     Anxiety     Arthritis     Blood transfusion     Cataract     Depression     GERD (gastroesophageal reflux disease)     Heart murmur     Hypercholesterolemia     Primary hypertension 11/24/2021    Prostate cancer     Thyroid disease      Social History     Socioeconomic History     Marital status:    Tobacco Use    Smoking status: Former    Smokeless tobacco: Never    Tobacco comments:     quit 40 years ago   Substance and Sexual Activity    Alcohol use: No    Drug use: No    Sexual activity: Not Currently     Current Outpatient Medications on File Prior to Visit   Medication Sig Dispense Refill    ACETAMINOPHEN (TYLENOL ORAL) Take by mouth as needed.      aspirin (ECOTRIN) 81 MG EC tablet Take 81 mg by mouth once daily.      cholecalciferol, vitamin D3, 1,000 unit capsule Take 1,000 Units by mouth 2 (two) times daily.      esomeprazole (NEXIUM) 40 MG capsule Take 40 mg by mouth before dinner.       ferrous gluconate (FERGON) 324 MG tablet Take 324 mg by mouth.      hydroCHLOROthiazide (HYDRODIURIL) 12.5 MG Tab Take 1 tablet (12.5 mg total) by mouth daily as needed. 30 tablet 6    hydrocortisone 2.5 % ointment Apply bid for 1-2 weeks      levothyroxine (SYNTHROID) 112 MCG tablet Take 1 tablet by mouth every morning.      losartan (COZAAR) 25 MG tablet Take 25 mg by mouth.      MULTIVIT-IRON-MIN-FOLIC ACID 3,500-18-0.4 UNIT-MG-MG ORAL CHEW Take by mouth.      rosuvastatin (CRESTOR) 20 MG tablet Take 20 mg by mouth once daily.      tramadol (ULTRAM) 50 mg tablet Take 50 mg by mouth every 6 (six) hours as needed.      azelastine (ASTELIN) 137 mcg (0.1 %) nasal spray 1 spray by Nasal route.      CHLORPHENIRAMINE MALEATE (CHLORTABS ORAL) Take by mouth once daily.      fluticasone (FLONASE) 50 mcg/actuation nasal spray 1 spray by Each Nare route once daily.      [DISCONTINUED] levothyroxine (SYNTHROID) 88 MCG tablet Take 100 mcg by mouth once daily.        No current facility-administered medications on file prior to visit.     Review of patient's allergies indicates:   Allergen Reactions    Tomato (solanum lycopersicum)     Grape Rash    Orange Rash       Objective:     Physical Exam  Vitals and nursing note reviewed.   Constitutional:       Appearance: He is well-developed.   HENT:       Head: Normocephalic and atraumatic.   Eyes:      Conjunctiva/sclera: Conjunctivae normal.      Pupils: Pupils are equal, round, and reactive to light.   Cardiovascular:      Rate and Rhythm: Normal rate and regular rhythm.      Pulses: Intact distal pulses.      Heart sounds: Normal heart sounds.   Pulmonary:      Effort: Pulmonary effort is normal.      Breath sounds: Normal breath sounds.   Abdominal:      General: Bowel sounds are normal.      Palpations: Abdomen is soft.   Musculoskeletal:      Cervical back: Normal range of motion and neck supple.   Skin:     General: Skin is warm and dry.   Neurological:      Mental Status: He is alert and oriented to person, place, and time.       Assessment:   HTN  HLP  AVR    Plan:         Continue asa- primary prvention  Continue statin-hlp  Continue losartan, hctz-htn

## 2023-06-22 ENCOUNTER — HOSPITAL ENCOUNTER (INPATIENT)
Facility: HOSPITAL | Age: 88
LOS: 3 days | Discharge: HOME HEALTH | DRG: 281 | End: 2023-06-28
Attending: EMERGENCY MEDICINE | Admitting: HOSPITALIST
Payer: MEDICARE

## 2023-06-22 DIAGNOSIS — R78.81 BACTEREMIA: ICD-10-CM

## 2023-06-22 DIAGNOSIS — E78.00 PURE HYPERCHOLESTEROLEMIA: ICD-10-CM

## 2023-06-22 DIAGNOSIS — B95.5 STREPTOCOCCAL ENDOCARDITIS: Primary | ICD-10-CM

## 2023-06-22 DIAGNOSIS — R10.13 EPIGASTRIC PAIN: ICD-10-CM

## 2023-06-22 DIAGNOSIS — K21.9 GASTROESOPHAGEAL REFLUX DISEASE, UNSPECIFIED WHETHER ESOPHAGITIS PRESENT: ICD-10-CM

## 2023-06-22 DIAGNOSIS — R07.9 CHEST PAIN: ICD-10-CM

## 2023-06-22 DIAGNOSIS — D64.9 ANEMIA, UNSPECIFIED TYPE: ICD-10-CM

## 2023-06-22 DIAGNOSIS — I10 PRIMARY HYPERTENSION: ICD-10-CM

## 2023-06-22 DIAGNOSIS — N17.9 AKI (ACUTE KIDNEY INJURY): ICD-10-CM

## 2023-06-22 DIAGNOSIS — R79.89 ELEVATED TROPONIN: ICD-10-CM

## 2023-06-22 DIAGNOSIS — F03.90 DEMENTIA, UNSPECIFIED DEMENTIA SEVERITY, UNSPECIFIED DEMENTIA TYPE, UNSPECIFIED WHETHER BEHAVIORAL, PSYCHOTIC, OR MOOD DISTURBANCE OR ANXIETY: ICD-10-CM

## 2023-06-22 DIAGNOSIS — Z95.2 S/P AVR (AORTIC VALVE REPLACEMENT): ICD-10-CM

## 2023-06-22 DIAGNOSIS — E03.9 HYPOTHYROIDISM, UNSPECIFIED TYPE: ICD-10-CM

## 2023-06-22 DIAGNOSIS — I21.4 NSTEMI (NON-ST ELEVATED MYOCARDIAL INFARCTION): ICD-10-CM

## 2023-06-22 DIAGNOSIS — I33.0 STREPTOCOCCAL ENDOCARDITIS: Primary | ICD-10-CM

## 2023-06-22 LAB
ALBUMIN SERPL BCP-MCNC: 3.6 G/DL (ref 3.5–5.2)
ALP SERPL-CCNC: 65 U/L (ref 55–135)
ALT SERPL W/O P-5'-P-CCNC: 11 U/L (ref 10–44)
ANION GAP SERPL CALC-SCNC: 11 MMOL/L (ref 8–16)
AST SERPL-CCNC: 18 U/L (ref 10–40)
BASOPHILS # BLD AUTO: 0.01 K/UL (ref 0–0.2)
BASOPHILS NFR BLD: 0.1 % (ref 0–1.9)
BILIRUB SERPL-MCNC: 0.5 MG/DL (ref 0.1–1)
BNP SERPL-MCNC: 254 PG/ML (ref 0–99)
BUN SERPL-MCNC: 30 MG/DL (ref 8–23)
CALCIUM SERPL-MCNC: 9.4 MG/DL (ref 8.7–10.5)
CHLORIDE SERPL-SCNC: 103 MMOL/L (ref 95–110)
CO2 SERPL-SCNC: 26 MMOL/L (ref 23–29)
CREAT SERPL-MCNC: 2 MG/DL (ref 0.5–1.4)
DIFFERENTIAL METHOD: ABNORMAL
EOSINOPHIL # BLD AUTO: 0 K/UL (ref 0–0.5)
EOSINOPHIL NFR BLD: 0 % (ref 0–8)
ERYTHROCYTE [DISTWIDTH] IN BLOOD BY AUTOMATED COUNT: 12.9 % (ref 11.5–14.5)
EST. GFR  (NO RACE VARIABLE): 31 ML/MIN/1.73 M^2
GLUCOSE SERPL-MCNC: 132 MG/DL (ref 70–110)
HCT VFR BLD AUTO: 28.5 % (ref 40–54)
HGB BLD-MCNC: 9.5 G/DL (ref 14–18)
IMM GRANULOCYTES # BLD AUTO: 0.12 K/UL (ref 0–0.04)
IMM GRANULOCYTES NFR BLD AUTO: 1 % (ref 0–0.5)
LACTATE SERPL-SCNC: 1.5 MMOL/L (ref 0.5–2.2)
LIPASE SERPL-CCNC: 16 U/L (ref 4–60)
LYMPHOCYTES # BLD AUTO: 0.3 K/UL (ref 1–4.8)
LYMPHOCYTES NFR BLD: 2.6 % (ref 18–48)
MCH RBC QN AUTO: 33.2 PG (ref 27–31)
MCHC RBC AUTO-ENTMCNC: 33.3 G/DL (ref 32–36)
MCV RBC AUTO: 100 FL (ref 82–98)
MONOCYTES # BLD AUTO: 0.7 K/UL (ref 0.3–1)
MONOCYTES NFR BLD: 5.7 % (ref 4–15)
NEUTROPHILS # BLD AUTO: 10.9 K/UL (ref 1.8–7.7)
NEUTROPHILS NFR BLD: 90.6 % (ref 38–73)
NRBC BLD-RTO: 0 /100 WBC
PLATELET # BLD AUTO: 152 K/UL (ref 150–450)
PMV BLD AUTO: 10.5 FL (ref 9.2–12.9)
POTASSIUM SERPL-SCNC: 3.9 MMOL/L (ref 3.5–5.1)
PROT SERPL-MCNC: 7.4 G/DL (ref 6–8.4)
RBC # BLD AUTO: 2.86 M/UL (ref 4.6–6.2)
SODIUM SERPL-SCNC: 140 MMOL/L (ref 136–145)
TROPONIN I SERPL DL<=0.01 NG/ML-MCNC: 0.62 NG/ML (ref 0–0.03)
WBC # BLD AUTO: 12.01 K/UL (ref 3.9–12.7)

## 2023-06-22 PROCEDURE — 84484 ASSAY OF TROPONIN QUANT: CPT | Performed by: EMERGENCY MEDICINE

## 2023-06-22 PROCEDURE — 87186 SC STD MICRODIL/AGAR DIL: CPT | Performed by: EMERGENCY MEDICINE

## 2023-06-22 PROCEDURE — 81000 URINALYSIS NONAUTO W/SCOPE: CPT | Performed by: EMERGENCY MEDICINE

## 2023-06-22 PROCEDURE — 99285 EMERGENCY DEPT VISIT HI MDM: CPT | Mod: 25

## 2023-06-22 PROCEDURE — 80053 COMPREHEN METABOLIC PANEL: CPT | Performed by: EMERGENCY MEDICINE

## 2023-06-22 PROCEDURE — 93010 ELECTROCARDIOGRAM REPORT: CPT | Mod: ,,, | Performed by: INTERNAL MEDICINE

## 2023-06-22 PROCEDURE — 83690 ASSAY OF LIPASE: CPT | Performed by: EMERGENCY MEDICINE

## 2023-06-22 PROCEDURE — 85025 COMPLETE CBC W/AUTO DIFF WBC: CPT | Performed by: EMERGENCY MEDICINE

## 2023-06-22 PROCEDURE — 93010 EKG 12-LEAD: ICD-10-PCS | Mod: ,,, | Performed by: INTERNAL MEDICINE

## 2023-06-22 PROCEDURE — 87040 BLOOD CULTURE FOR BACTERIA: CPT | Mod: 59 | Performed by: EMERGENCY MEDICINE

## 2023-06-22 PROCEDURE — 83605 ASSAY OF LACTIC ACID: CPT | Performed by: EMERGENCY MEDICINE

## 2023-06-22 PROCEDURE — 93005 ELECTROCARDIOGRAM TRACING: CPT

## 2023-06-22 PROCEDURE — 87154 CUL TYP ID BLD PTHGN 6+ TRGT: CPT | Performed by: EMERGENCY MEDICINE

## 2023-06-22 PROCEDURE — 87077 CULTURE AEROBIC IDENTIFY: CPT | Performed by: EMERGENCY MEDICINE

## 2023-06-22 PROCEDURE — 83880 ASSAY OF NATRIURETIC PEPTIDE: CPT | Performed by: EMERGENCY MEDICINE

## 2023-06-23 PROBLEM — R10.13 EPIGASTRIC PAIN: Status: ACTIVE | Noted: 2023-06-23

## 2023-06-23 PROBLEM — R79.89 ELEVATED TROPONIN: Status: ACTIVE | Noted: 2023-06-23

## 2023-06-23 PROBLEM — N17.9 AKI (ACUTE KIDNEY INJURY): Status: ACTIVE | Noted: 2023-06-23

## 2023-06-23 PROBLEM — I21.4 NSTEMI (NON-ST ELEVATED MYOCARDIAL INFARCTION): Status: ACTIVE | Noted: 2023-06-23

## 2023-06-23 PROBLEM — K21.9 GERD (GASTROESOPHAGEAL REFLUX DISEASE): Status: ACTIVE | Noted: 2023-06-23

## 2023-06-23 LAB
ANION GAP SERPL CALC-SCNC: 9 MMOL/L (ref 8–16)
AORTIC ROOT ANNULUS: 2.24 CM
APTT PPP: 27.3 SEC (ref 21–32)
APTT PPP: 54.1 SEC (ref 21–32)
APTT PPP: 63.8 SEC (ref 21–32)
ASCENDING AORTA: 2.41 CM
AV INDEX (PROSTH): 0.64
AV MEAN GRADIENT: 24 MMHG
AV PEAK GRADIENT: 35 MMHG
AV VALVE AREA: 1.76 CM2
AV VELOCITY RATIO: 0.63
BACTERIA #/AREA URNS HPF: ABNORMAL /HPF
BILIRUB UR QL STRIP: NEGATIVE
BSA FOR ECHO PROCEDURE: 1.86 M2
BUN SERPL-MCNC: 32 MG/DL (ref 8–23)
CALCIUM SERPL-MCNC: 9.2 MG/DL (ref 8.7–10.5)
CHLORIDE SERPL-SCNC: 104 MMOL/L (ref 95–110)
CLARITY UR: CLEAR
CO2 SERPL-SCNC: 23 MMOL/L (ref 23–29)
COLOR UR: YELLOW
CREAT SERPL-MCNC: 1.9 MG/DL (ref 0.5–1.4)
CV ECHO LV RWT: 0.64 CM
DOP CALC AO PEAK VEL: 2.95 M/S
DOP CALC AO VTI: 72.2 CM
DOP CALC LVOT AREA: 2.8 CM2
DOP CALC LVOT DIAMETER: 1.88 CM
DOP CALC LVOT PEAK VEL: 1.87 M/S
DOP CALC LVOT STROKE VOLUME: 127.35 CM3
DOP CALC MV VTI: 44.8 CM
DOP CALC RVOT PEAK VEL: 0.84 M/S
DOP CALC RVOT VTI: 17.1 CM
DOP CALCLVOT PEAK VEL VTI: 45.9 CM
E WAVE DECELERATION TIME: 293.64 MSEC
E/A RATIO: 1.07
E/E' RATIO: 16.24 M/S
ECHO LV POSTERIOR WALL: 1.46 CM (ref 0.6–1.1)
EJECTION FRACTION: 60 %
EST. GFR  (NO RACE VARIABLE): 33 ML/MIN/1.73 M^2
FRACTIONAL SHORTENING: 38 % (ref 28–44)
GLUCOSE SERPL-MCNC: 118 MG/DL (ref 70–110)
GLUCOSE UR QL STRIP: NEGATIVE
HGB UR QL STRIP: ABNORMAL
HYALINE CASTS #/AREA URNS LPF: 1 /LPF
INR PPP: 1 (ref 0.8–1.2)
INTERVENTRICULAR SEPTUM: 1.49 CM (ref 0.6–1.1)
IVC DIAMETER: 2.16 CM
IVRT: 60.89 MSEC
KETONES UR QL STRIP: NEGATIVE
LA MAJOR: 4.65 CM
LA MINOR: 4.85 CM
LA WIDTH: 3.8 CM
LEFT ATRIUM SIZE: 4.32 CM
LEFT ATRIUM VOLUME INDEX: 35.4 ML/M2
LEFT ATRIUM VOLUME: 66.25 CM3
LEFT INTERNAL DIMENSION IN SYSTOLE: 2.8 CM (ref 2.1–4)
LEFT VENTRICLE DIASTOLIC VOLUME INDEX: 50.34 ML/M2
LEFT VENTRICLE DIASTOLIC VOLUME: 94.13 ML
LEFT VENTRICLE MASS INDEX: 145 G/M2
LEFT VENTRICLE SYSTOLIC VOLUME INDEX: 15.8 ML/M2
LEFT VENTRICLE SYSTOLIC VOLUME: 29.5 ML
LEFT VENTRICULAR INTERNAL DIMENSION IN DIASTOLE: 4.53 CM (ref 3.5–6)
LEFT VENTRICULAR MASS: 271.46 G
LEUKOCYTE ESTERASE UR QL STRIP: NEGATIVE
LV LATERAL E/E' RATIO: 13.8 M/S
LV SEPTAL E/E' RATIO: 19.71 M/S
LVOT MG: 9.33 MMHG
LVOT MV: 1.4 CM/S
MAGNESIUM SERPL-MCNC: 2.1 MG/DL (ref 1.6–2.6)
MICROSCOPIC COMMENT: ABNORMAL
MV MEAN GRADIENT: 4 MMHG
MV PEAK A VEL: 1.29 M/S
MV PEAK E VEL: 1.38 M/S
MV PEAK GRADIENT: 9 MMHG
MV STENOSIS PRESSURE HALF TIME: 85.16 MS
MV VALVE AREA BY CONTINUITY EQUATION: 2.84 CM2
MV VALVE AREA P 1/2 METHOD: 2.58 CM2
NITRITE UR QL STRIP: NEGATIVE
PH UR STRIP: 6 [PH] (ref 5–8)
PISA MRMAX VEL: 5.06 M/S
PISA TR MAX VEL: 2.87 M/S
POTASSIUM SERPL-SCNC: 4.2 MMOL/L (ref 3.5–5.1)
PROT UR QL STRIP: ABNORMAL
PROTHROMBIN TIME: 11 SEC (ref 9–12.5)
PV MEAN GRADIENT: 1.71 MMHG
RA MAJOR: 3.68 CM
RA PRESSURE: 8 MMHG
RA WIDTH: 2.2 CM
RBC #/AREA URNS HPF: 70 /HPF (ref 0–4)
SODIUM SERPL-SCNC: 136 MMOL/L (ref 136–145)
SP GR UR STRIP: 1.02 (ref 1–1.03)
STJ: 2.37 CM
TDI LATERAL: 0.1 M/S
TDI SEPTAL: 0.07 M/S
TDI: 0.09 M/S
TR MAX PG: 33 MMHG
TR MEAN GRADIENT: 21 MMHG
TRICUSPID ANNULAR PLANE SYSTOLIC EXCURSION: 1.8 CM
TROPONIN I SERPL DL<=0.01 NG/ML-MCNC: 1.67 NG/ML (ref 0–0.03)
TROPONIN I SERPL DL<=0.01 NG/ML-MCNC: 3.89 NG/ML (ref 0–0.03)
TROPONIN I SERPL DL<=0.01 NG/ML-MCNC: 5.02 NG/ML (ref 0–0.03)
TROPONIN I SERPL DL<=0.01 NG/ML-MCNC: 5.96 NG/ML (ref 0–0.03)
TV REST PULMONARY ARTERY PRESSURE: 41 MMHG
URN SPEC COLLECT METH UR: ABNORMAL
UROBILINOGEN UR STRIP-ACNC: NEGATIVE EU/DL
WBC #/AREA URNS HPF: 3 /HPF (ref 0–5)

## 2023-06-23 PROCEDURE — 25000003 PHARM REV CODE 250: Performed by: HOSPITALIST

## 2023-06-23 PROCEDURE — 80048 BASIC METABOLIC PNL TOTAL CA: CPT | Performed by: NURSE PRACTITIONER

## 2023-06-23 PROCEDURE — 96365 THER/PROPH/DIAG IV INF INIT: CPT | Mod: 59

## 2023-06-23 PROCEDURE — 36415 COLL VENOUS BLD VENIPUNCTURE: CPT | Performed by: FAMILY MEDICINE

## 2023-06-23 PROCEDURE — 96361 HYDRATE IV INFUSION ADD-ON: CPT

## 2023-06-23 PROCEDURE — 25000003 PHARM REV CODE 250: Performed by: EMERGENCY MEDICINE

## 2023-06-23 PROCEDURE — 25000003 PHARM REV CODE 250: Performed by: NURSE PRACTITIONER

## 2023-06-23 PROCEDURE — 25000003 PHARM REV CODE 250: Performed by: FAMILY MEDICINE

## 2023-06-23 PROCEDURE — 36415 COLL VENOUS BLD VENIPUNCTURE: CPT | Performed by: HOSPITALIST

## 2023-06-23 PROCEDURE — 85730 THROMBOPLASTIN TIME PARTIAL: CPT | Performed by: FAMILY MEDICINE

## 2023-06-23 PROCEDURE — G0378 HOSPITAL OBSERVATION PER HR: HCPCS

## 2023-06-23 PROCEDURE — 63600175 PHARM REV CODE 636 W HCPCS: Performed by: FAMILY MEDICINE

## 2023-06-23 PROCEDURE — 84484 ASSAY OF TROPONIN QUANT: CPT | Mod: 91 | Performed by: NURSE PRACTITIONER

## 2023-06-23 PROCEDURE — 63600175 PHARM REV CODE 636 W HCPCS: Performed by: NURSE PRACTITIONER

## 2023-06-23 PROCEDURE — 96375 TX/PRO/DX INJ NEW DRUG ADDON: CPT

## 2023-06-23 PROCEDURE — 63600175 PHARM REV CODE 636 W HCPCS: Performed by: EMERGENCY MEDICINE

## 2023-06-23 PROCEDURE — 96366 THER/PROPH/DIAG IV INF ADDON: CPT

## 2023-06-23 PROCEDURE — 85730 THROMBOPLASTIN TIME PARTIAL: CPT | Mod: 91 | Performed by: NURSE PRACTITIONER

## 2023-06-23 PROCEDURE — 99223 1ST HOSP IP/OBS HIGH 75: CPT | Mod: 25,,, | Performed by: INTERNAL MEDICINE

## 2023-06-23 PROCEDURE — 94761 N-INVAS EAR/PLS OXIMETRY MLT: CPT

## 2023-06-23 PROCEDURE — 36415 COLL VENOUS BLD VENIPUNCTURE: CPT | Performed by: NURSE PRACTITIONER

## 2023-06-23 PROCEDURE — 83735 ASSAY OF MAGNESIUM: CPT | Performed by: FAMILY MEDICINE

## 2023-06-23 PROCEDURE — 84484 ASSAY OF TROPONIN QUANT: CPT | Mod: 91 | Performed by: PHYSICIAN ASSISTANT

## 2023-06-23 PROCEDURE — C9113 INJ PANTOPRAZOLE SODIUM, VIA: HCPCS | Performed by: EMERGENCY MEDICINE

## 2023-06-23 PROCEDURE — 85730 THROMBOPLASTIN TIME PARTIAL: CPT | Mod: 91 | Performed by: HOSPITALIST

## 2023-06-23 PROCEDURE — 36415 COLL VENOUS BLD VENIPUNCTURE: CPT | Performed by: PHYSICIAN ASSISTANT

## 2023-06-23 PROCEDURE — 96374 THER/PROPH/DIAG INJ IV PUSH: CPT

## 2023-06-23 PROCEDURE — 85610 PROTHROMBIN TIME: CPT | Performed by: NURSE PRACTITIONER

## 2023-06-23 PROCEDURE — 99223 PR INITIAL HOSPITAL CARE,LEVL III: ICD-10-PCS | Mod: 25,,, | Performed by: INTERNAL MEDICINE

## 2023-06-23 PROCEDURE — 96376 TX/PRO/DX INJ SAME DRUG ADON: CPT

## 2023-06-23 PROCEDURE — 84484 ASSAY OF TROPONIN QUANT: CPT | Performed by: EMERGENCY MEDICINE

## 2023-06-23 RX ORDER — METHOCARBAMOL 500 MG/1
500 TABLET, FILM COATED ORAL ONCE
Status: COMPLETED | OUTPATIENT
Start: 2023-06-23 | End: 2023-06-23

## 2023-06-23 RX ORDER — SODIUM CHLORIDE 0.9 % (FLUSH) 0.9 %
3 SYRINGE (ML) INJECTION EVERY 12 HOURS PRN
Status: DISCONTINUED | OUTPATIENT
Start: 2023-06-23 | End: 2023-06-28 | Stop reason: HOSPADM

## 2023-06-23 RX ORDER — FERROUS GLUCONATE 324(38)MG
324 TABLET ORAL
Status: DISCONTINUED | OUTPATIENT
Start: 2023-06-23 | End: 2023-06-23

## 2023-06-23 RX ORDER — SIMETHICONE 80 MG
1 TABLET,CHEWABLE ORAL 4 TIMES DAILY PRN
Status: DISCONTINUED | OUTPATIENT
Start: 2023-06-23 | End: 2023-06-28 | Stop reason: HOSPADM

## 2023-06-23 RX ORDER — ENOXAPARIN SODIUM 100 MG/ML
30 INJECTION SUBCUTANEOUS EVERY 24 HOURS
Status: DISCONTINUED | OUTPATIENT
Start: 2023-06-23 | End: 2023-06-23

## 2023-06-23 RX ORDER — ATORVASTATIN CALCIUM 40 MG/1
80 TABLET, FILM COATED ORAL NIGHTLY
Status: DISCONTINUED | OUTPATIENT
Start: 2023-06-23 | End: 2023-06-23

## 2023-06-23 RX ORDER — PANTOPRAZOLE SODIUM 40 MG/10ML
40 INJECTION, POWDER, LYOPHILIZED, FOR SOLUTION INTRAVENOUS
Status: COMPLETED | OUTPATIENT
Start: 2023-06-23 | End: 2023-06-23

## 2023-06-23 RX ORDER — MAG HYDROX/ALUMINUM HYD/SIMETH 200-200-20
30 SUSPENSION, ORAL (FINAL DOSE FORM) ORAL 4 TIMES DAILY PRN
Status: DISCONTINUED | OUTPATIENT
Start: 2023-06-23 | End: 2023-06-24

## 2023-06-23 RX ORDER — LANOLIN ALCOHOL/MO/W.PET/CERES
1 CREAM (GRAM) TOPICAL DAILY
Status: DISCONTINUED | OUTPATIENT
Start: 2023-06-23 | End: 2023-06-28 | Stop reason: HOSPADM

## 2023-06-23 RX ORDER — LEVOTHYROXINE SODIUM 112 UG/1
112 TABLET ORAL
Status: DISCONTINUED | OUTPATIENT
Start: 2023-06-23 | End: 2023-06-28 | Stop reason: HOSPADM

## 2023-06-23 RX ORDER — HEPARIN SODIUM,PORCINE/D5W 25000/250
0-40 INTRAVENOUS SOLUTION INTRAVENOUS CONTINUOUS
Status: DISCONTINUED | OUTPATIENT
Start: 2023-06-23 | End: 2023-06-24

## 2023-06-23 RX ORDER — LIDOCAINE HYDROCHLORIDE 20 MG/ML
15 SOLUTION OROPHARYNGEAL ONCE
Status: DISCONTINUED | OUTPATIENT
Start: 2023-06-23 | End: 2023-06-24

## 2023-06-23 RX ORDER — SODIUM CHLORIDE 9 MG/ML
1000 INJECTION, SOLUTION INTRAVENOUS CONTINUOUS
Status: ACTIVE | OUTPATIENT
Start: 2023-06-23 | End: 2023-06-23

## 2023-06-23 RX ORDER — ONDANSETRON 2 MG/ML
4 INJECTION INTRAMUSCULAR; INTRAVENOUS EVERY 8 HOURS PRN
Status: DISCONTINUED | OUTPATIENT
Start: 2023-06-23 | End: 2023-06-28 | Stop reason: HOSPADM

## 2023-06-23 RX ORDER — METHOCARBAMOL 100 MG/ML
500 INJECTION, SOLUTION INTRAMUSCULAR; INTRAVENOUS ONCE
Status: COMPLETED | OUTPATIENT
Start: 2023-06-23 | End: 2023-06-23

## 2023-06-23 RX ORDER — ACETAMINOPHEN 650 MG/1
650 SUPPOSITORY RECTAL EVERY 4 HOURS PRN
Status: DISCONTINUED | OUTPATIENT
Start: 2023-06-23 | End: 2023-06-23

## 2023-06-23 RX ORDER — MORPHINE SULFATE 4 MG/ML
2 INJECTION, SOLUTION INTRAMUSCULAR; INTRAVENOUS EVERY 4 HOURS PRN
Status: DISCONTINUED | OUTPATIENT
Start: 2023-06-23 | End: 2023-06-24

## 2023-06-23 RX ORDER — HYDROCHLOROTHIAZIDE 12.5 MG/1
12.5 CAPSULE ORAL DAILY
Status: ON HOLD | COMMUNITY
Start: 2023-05-26 | End: 2023-06-24 | Stop reason: HOSPADM

## 2023-06-23 RX ORDER — HYDROCODONE BITARTRATE AND ACETAMINOPHEN 5; 325 MG/1; MG/1
1 TABLET ORAL EVERY 6 HOURS PRN
Status: DISCONTINUED | OUTPATIENT
Start: 2023-06-23 | End: 2023-06-24

## 2023-06-23 RX ORDER — MAG HYDROX/ALUMINUM HYD/SIMETH 200-200-20
30 SUSPENSION, ORAL (FINAL DOSE FORM) ORAL ONCE
Status: COMPLETED | OUTPATIENT
Start: 2023-06-23 | End: 2023-06-23

## 2023-06-23 RX ORDER — HYDROCORTISONE 25 MG/G
CREAM TOPICAL 2 TIMES DAILY
COMMUNITY
Start: 2023-01-19

## 2023-06-23 RX ORDER — ACETAMINOPHEN 325 MG/1
650 TABLET ORAL EVERY 6 HOURS PRN
Status: DISCONTINUED | OUTPATIENT
Start: 2023-06-23 | End: 2023-06-28 | Stop reason: HOSPADM

## 2023-06-23 RX ORDER — DEXTROSE 40 %
30 GEL (GRAM) ORAL
Status: DISCONTINUED | OUTPATIENT
Start: 2023-06-23 | End: 2023-06-28 | Stop reason: HOSPADM

## 2023-06-23 RX ORDER — ACETAMINOPHEN 325 MG/1
650 TABLET ORAL EVERY 8 HOURS PRN
Status: DISCONTINUED | OUTPATIENT
Start: 2023-06-23 | End: 2023-06-23

## 2023-06-23 RX ORDER — PANTOPRAZOLE SODIUM 40 MG/1
40 TABLET, DELAYED RELEASE ORAL NIGHTLY
Status: DISCONTINUED | OUTPATIENT
Start: 2023-06-23 | End: 2023-06-28 | Stop reason: HOSPADM

## 2023-06-23 RX ORDER — DEXTROSE 40 %
15 GEL (GRAM) ORAL
Status: DISCONTINUED | OUTPATIENT
Start: 2023-06-23 | End: 2023-06-28 | Stop reason: HOSPADM

## 2023-06-23 RX ORDER — TALC
6 POWDER (GRAM) TOPICAL NIGHTLY PRN
Status: DISCONTINUED | OUTPATIENT
Start: 2023-06-23 | End: 2023-06-28 | Stop reason: HOSPADM

## 2023-06-23 RX ORDER — POLYETHYLENE GLYCOL 3350 17 G/17G
17 POWDER, FOR SOLUTION ORAL DAILY PRN
Status: DISCONTINUED | OUTPATIENT
Start: 2023-06-23 | End: 2023-06-28 | Stop reason: HOSPADM

## 2023-06-23 RX ORDER — GLUCAGON 1 MG
1 KIT INJECTION
Status: DISCONTINUED | OUTPATIENT
Start: 2023-06-23 | End: 2023-06-28 | Stop reason: HOSPADM

## 2023-06-23 RX ORDER — NALOXONE HCL 0.4 MG/ML
0.02 VIAL (ML) INJECTION
Status: DISCONTINUED | OUTPATIENT
Start: 2023-06-23 | End: 2023-06-28 | Stop reason: HOSPADM

## 2023-06-23 RX ORDER — PROMETHAZINE HYDROCHLORIDE 25 MG/1
25 TABLET ORAL EVERY 6 HOURS PRN
Status: DISCONTINUED | OUTPATIENT
Start: 2023-06-23 | End: 2023-06-28 | Stop reason: HOSPADM

## 2023-06-23 RX ORDER — ASPIRIN 81 MG/1
81 TABLET ORAL DAILY
Status: DISCONTINUED | OUTPATIENT
Start: 2023-06-23 | End: 2023-06-23

## 2023-06-23 RX ADMIN — HYDROCODONE BITARTRATE AND ACETAMINOPHEN 1 TABLET: 5; 325 TABLET ORAL at 09:06

## 2023-06-23 RX ADMIN — Medication 6 MG: at 08:06

## 2023-06-23 RX ADMIN — ACETAMINOPHEN 650 MG: 325 TABLET ORAL at 10:06

## 2023-06-23 RX ADMIN — SIMETHICONE 80 MG: 80 TABLET, CHEWABLE ORAL at 02:06

## 2023-06-23 RX ADMIN — PANTOPRAZOLE SODIUM 40 MG: 40 INJECTION, POWDER, FOR SOLUTION INTRAVENOUS at 02:06

## 2023-06-23 RX ADMIN — PANTOPRAZOLE SODIUM 40 MG: 40 TABLET, DELAYED RELEASE ORAL at 08:06

## 2023-06-23 RX ADMIN — LEVOTHYROXINE SODIUM 112 MCG: 0.11 TABLET ORAL at 06:06

## 2023-06-23 RX ADMIN — SODIUM CHLORIDE 1000 ML: 9 INJECTION, SOLUTION INTRAVENOUS at 10:06

## 2023-06-23 RX ADMIN — METHOCARBAMOL 500 MG: 500 TABLET ORAL at 11:06

## 2023-06-23 RX ADMIN — ATORVASTATIN CALCIUM 80 MG: 40 TABLET, FILM COATED ORAL at 08:06

## 2023-06-23 RX ADMIN — ACETAMINOPHEN 650 MG: 325 TABLET ORAL at 08:06

## 2023-06-23 RX ADMIN — ALUMINUM HYDROXIDE, MAGNESIUM HYDROXIDE, AND DIMETHICONE 30 ML: 200; 20; 200 SUSPENSION ORAL at 02:06

## 2023-06-23 RX ADMIN — METHOCARBAMOL 500 MG: 100 INJECTION INTRAMUSCULAR; INTRAVENOUS at 10:06

## 2023-06-23 RX ADMIN — ATORVASTATIN CALCIUM 80 MG: 40 TABLET, FILM COATED ORAL at 02:06

## 2023-06-23 RX ADMIN — FERROUS SULFATE TAB 325 MG (65 MG ELEMENTAL FE) 1 EACH: 325 (65 FE) TAB at 09:06

## 2023-06-23 NOTE — ASSESSMENT & PLAN NOTE
-Presents with acid reflux type symptoms over the past few days  -Troponin 0.623>1.671>3.887, repeat pending  -Echo pending  -Continue ASA, statin, heparin gtt  -BP soft, will maximize regimen as tolerated  -Discussed invasive mgmt with [facility] once renal function stabilizes/improves, family/pt unsure about proceeding at present time. Continue OMT in interim.

## 2023-06-23 NOTE — ASSESSMENT & PLAN NOTE
Denies cardiopulmonary complaints at this time.  Vital signs stable.  Plan:  -tele monitoring  -trend trops  -repeat ekg prn  -cards consult in am

## 2023-06-23 NOTE — ED PROVIDER NOTES
SCRIBE #1 NOTE: I, Chula Long, am scribing for, and in the presence of, Serg Chiu MD. I have scribed the entire note.       History     Chief Complaint   Patient presents with    Abdominal Pain     Pt presents to ed via aasi c/o lower abdominal pain described as dull, denies N/V/D. Ems was initially called for chest pain but on ems arrival pt states he no longer has chest pain. Denies sob     Review of patient's allergies indicates:   Allergen Reactions    Tomato (solanum lycopersicum)     Grape Rash    Orange Rash         History of Present Illness     HPI    6/22/2023, 10:21 PM  History obtained from the patient and family. Limited due to patient's severe dementia.       History of Present Illness: Juan Castellanos is a 89 y.o. male patient with a PMHx of dementia, prostate cancer, GERD, HTN, and hypercholesteremia who presents to the Emergency Department for evaluation of RLQ abdominal pain. Pt states he has urinated today but does not know if he had a bowel movement today. Pt's daughter states pt earlier reported pain in RUQ but states pain has now moved to RLQ.  Pt is mildly confused. Symptoms are constant and moderate in severity. No mitigating or exacerbating factors reported. Associated sxs include HA. Patient denies any vomiting, and all other sxs at this time.  No further complaints or concerns at this time.       Arrival mode: Ambulance service     PCP: Devyn Beck MD        Past Medical History:  Past Medical History:   Diagnosis Date    Allergy     Anxiety     Arthritis     Blood transfusion     Cataract     Depression     GERD (gastroesophageal reflux disease)     Heart murmur     Hypercholesterolemia     Primary hypertension 11/24/2021    Prostate cancer     Thyroid disease        Past Surgical History:  Past Surgical History:   Procedure Laterality Date    ABDOMINAL HERNIA REPAIR Right     AORTIC VALVE REPLACEMENT      BASAL CELL CARCINOMA EXCISION Right 2022    CARDIAC VALVE SURGERY       PROSTATECTOMY           Family History:  Family History   Problem Relation Age of Onset    Cataracts Father     Diabetes Brother        Social History:  Social History     Tobacco Use    Smoking status: Former    Smokeless tobacco: Never    Tobacco comments:     quit 40 years ago   Substance and Sexual Activity    Alcohol use: No    Drug use: No    Sexual activity: Not Currently        Review of Systems     Review of Systems   Gastrointestinal:  Positive for abdominal pain. Negative for vomiting.   Neurological:  Positive for headaches.    Physical Exam     Initial Vitals [06/22/23 2145]   BP Pulse Resp Temp SpO2   108/80 84 18 100.1 °F (37.8 °C) 97 %      MAP       --          Physical Exam  Nursing Notes and Vital Signs Reviewed.  Constitutional: Patient is in no acute distress. Well-developed and well-nourished.  Head: Atraumatic. Normocephalic.  Eyes: PERRL. EOM intact. Conjunctivae are not pale. No scleral icterus.  ENT: Mucous membranes are moist. Oropharynx is clear and symmetric.    Neck: Supple. Full ROM. No lymphadenopathy.  Cardiovascular: Regular rate. Irregular Irregular rhythm. No murmurs, rubs, or gallops. Distal pulses are 2+ and symmetric.  Pulmonary/Chest: No respiratory distress. Clear to auscultation bilaterally. No wheezing or rales.  Abdominal: Soft and non-distended.  RLQ tenderness to palpation.  No rebound, guarding, or rigidity.   Genitourinary: No CVA tenderness  Musculoskeletal: Moves all extremities. No obvious deformities. No edema. No calf tenderness.  Skin: Warm and dry.  Neurological:  Alert, awake, and appropriate.  Normal speech.  No acute focal neurological deficits are appreciated.  Psychiatric: Normal affect. Good eye contact. Appropriate in content.     ED Course   Procedures  ED Vital Signs:  Vitals:    06/22/23 2145 06/22/23 2208 06/22/23 2300 06/22/23 2335   BP: 108/80  (!) 111/58 (!) 105/55   Pulse: 84 103 98 92   Resp: 18      Temp: 100.1 °F (37.8 °C)      TempSrc: Oral     "  SpO2: 97%  (!) 94% (!) 94%   Weight: 69.9 kg (154 lb)      Height: 5' 10" (1.778 m)       06/23/23 0107 06/23/23 0108   BP: (!) 102/53    Pulse: 77    Resp: 20    Temp:  98.9 °F (37.2 °C)   TempSrc:  Oral   SpO2: 95%    Weight:     Height:         Abnormal Lab Results:  Labs Reviewed   CBC W/ AUTO DIFFERENTIAL - Abnormal; Notable for the following components:       Result Value    RBC 2.86 (*)     Hemoglobin 9.5 (*)     Hematocrit 28.5 (*)      (*)     MCH 33.2 (*)     Immature Granulocytes 1.0 (*)     Gran # (ANC) 10.9 (*)     Immature Grans (Abs) 0.12 (*)     Lymph # 0.3 (*)     Gran % 90.6 (*)     Lymph % 2.6 (*)     All other components within normal limits   COMPREHENSIVE METABOLIC PANEL - Abnormal; Notable for the following components:    Glucose 132 (*)     BUN 30 (*)     Creatinine 2.0 (*)     eGFR 31 (*)     All other components within normal limits   URINALYSIS, REFLEX TO URINE CULTURE - Abnormal; Notable for the following components:    Protein, UA 1+ (*)     Occult Blood UA 3+ (*)     All other components within normal limits    Narrative:     Specimen Source->Urine   TROPONIN I - Abnormal; Notable for the following components:    Troponin I 0.623 (*)     All other components within normal limits   B-TYPE NATRIURETIC PEPTIDE - Abnormal; Notable for the following components:     (*)     All other components within normal limits   URINALYSIS MICROSCOPIC - Abnormal; Notable for the following components:    RBC, UA 70 (*)     All other components within normal limits    Narrative:     Specimen Source->Urine   CULTURE, BLOOD   CULTURE, BLOOD   LACTIC ACID, PLASMA   LIPASE   TROPONIN I        All Lab Results:  Results for orders placed or performed during the hospital encounter of 06/22/23   CBC auto differential   Result Value Ref Range    WBC 12.01 3.90 - 12.70 K/uL    RBC 2.86 (L) 4.60 - 6.20 M/uL    Hemoglobin 9.5 (L) 14.0 - 18.0 g/dL    Hematocrit 28.5 (L) 40.0 - 54.0 %     (H) 82 - " 98 fL    MCH 33.2 (H) 27.0 - 31.0 pg    MCHC 33.3 32.0 - 36.0 g/dL    RDW 12.9 11.5 - 14.5 %    Platelets 152 150 - 450 K/uL    MPV 10.5 9.2 - 12.9 fL    Immature Granulocytes 1.0 (H) 0.0 - 0.5 %    Gran # (ANC) 10.9 (H) 1.8 - 7.7 K/uL    Immature Grans (Abs) 0.12 (H) 0.00 - 0.04 K/uL    Lymph # 0.3 (L) 1.0 - 4.8 K/uL    Mono # 0.7 0.3 - 1.0 K/uL    Eos # 0.0 0.0 - 0.5 K/uL    Baso # 0.01 0.00 - 0.20 K/uL    nRBC 0 0 /100 WBC    Gran % 90.6 (H) 38.0 - 73.0 %    Lymph % 2.6 (L) 18.0 - 48.0 %    Mono % 5.7 4.0 - 15.0 %    Eosinophil % 0.0 0.0 - 8.0 %    Basophil % 0.1 0.0 - 1.9 %    Differential Method Automated    Comprehensive metabolic panel   Result Value Ref Range    Sodium 140 136 - 145 mmol/L    Potassium 3.9 3.5 - 5.1 mmol/L    Chloride 103 95 - 110 mmol/L    CO2 26 23 - 29 mmol/L    Glucose 132 (H) 70 - 110 mg/dL    BUN 30 (H) 8 - 23 mg/dL    Creatinine 2.0 (H) 0.5 - 1.4 mg/dL    Calcium 9.4 8.7 - 10.5 mg/dL    Total Protein 7.4 6.0 - 8.4 g/dL    Albumin 3.6 3.5 - 5.2 g/dL    Total Bilirubin 0.5 0.1 - 1.0 mg/dL    Alkaline Phosphatase 65 55 - 135 U/L    AST 18 10 - 40 U/L    ALT 11 10 - 44 U/L    eGFR 31 (A) >60 mL/min/1.73 m^2    Anion Gap 11 8 - 16 mmol/L   Lactic acid, plasma   Result Value Ref Range    Lactate (Lactic Acid) 1.5 0.5 - 2.2 mmol/L   Urinalysis, Reflex to Urine Culture Urine, Clean Catch    Specimen: Urine   Result Value Ref Range    Specimen UA Urine, Clean Catch     Color, UA Yellow Yellow, Straw, Elisabeth    Appearance, UA Clear Clear    pH, UA 6.0 5.0 - 8.0    Specific Gravity, UA 1.020 1.005 - 1.030    Protein, UA 1+ (A) Negative    Glucose, UA Negative Negative    Ketones, UA Negative Negative    Bilirubin (UA) Negative Negative    Occult Blood UA 3+ (A) Negative    Nitrite, UA Negative Negative    Urobilinogen, UA Negative <2.0 EU/dL    Leukocytes, UA Negative Negative   Lipase   Result Value Ref Range    Lipase 16 4 - 60 U/L   Troponin I   Result Value Ref Range    Troponin I 0.623 (H)  0.000 - 0.026 ng/mL   Brain natriuretic peptide   Result Value Ref Range     (H) 0 - 99 pg/mL   Urinalysis Microscopic   Result Value Ref Range    RBC, UA 70 (H) 0 - 4 /hpf    WBC, UA 3 0 - 5 /hpf    Bacteria None None-Occ /hpf    Hyaline Casts, UA 1 0-1/lpf /lpf    Microscopic Comment SEE COMMENT          Imaging Results:  Imaging Results              CT Abdomen Pelvis  Without Contrast (In process)                      X-Ray Chest AP Portable (Final result)  Result time 06/22/23 22:54:38      Final result by Hugo Garcia MD (06/22/23 22:54:38)                   Impression:      No acute abnormality.      Electronically signed by: Hugo Garcia  Date:    06/22/2023  Time:    22:54               Narrative:    EXAMINATION:  XR CHEST AP PORTABLE    CLINICAL HISTORY:  Abdominal pain;    TECHNIQUE:  Single frontal view of the chest was performed.    COMPARISON:  None    FINDINGS:  Median sternotomy.The lungs are clear, with normal appearance of pulmonary vasculature and no pleural effusion or pneumothorax.    The cardiac silhouette is normal in size. The hilar and mediastinal contours are unremarkable.    Bones are intact.                                     STAT RAD  Radiologist: Tho Barker M.D.  Exam: CT Abdomen and Pelvis w/o IV contrast    Clinical history: epigastric pain    Technique: Axial computed tomography images of the abdomen and pelvis without intravenous contrast.     Comparison: No relevant prior studies available.     Findings: Lung bases: Bilateral dependent atelectasis. No mass. No consolidation.    Abdomen:  Liver: 4.1cm simple cyst in inferior right lobe of the liver.  Gallbladder and bile ducts: Unremarkable. No ductal dilation.  Pancreas: Unremarkable.No ductal dilation.  Spleen: Unremarkable. No splenomegaly.  Adrenals: Unremarkable. No mass.  Kidneys and ureters: 1.5cm cyst with peripheral calcifications arising off the mid left kidney. 3 simple cyst arising offf the right kidney  measuring up to 2.7cm. 1.7cm hyperdense lesion arising off the mid right kidney consistent with a hemorrhagic/proteinaceous cyst. No further workup is required. No hydronephrosis.   Stomach and bowel: Moderate sigmoid diverticulosis without evidence of diverticulitis. No obstruction.    Pelvis:  Appendix: Appendix is not identified but there are no secondary signs of acute appendicitis.   Bladder: Unremarkable. No stones.  Reproductive:Unremarkable as visualized.     Abdomen and pelvis:  Intraperitoneal space: Unremarkable. No free air. No significant fluid collection.   Bones/joints: No acute fracture. No dislocation.  Soft tissues: Unremarkable.  Vasculature: Unremarkable. No abdominal aortic aneurysm.   Lymph nodes: Unremarkable. No enlarged lymph nodes.    Impression: No acute findings in the abdomen or pelvis.       The EKG was ordered, reviewed, and independently interpreted by the ED provider.  Interpretation time: 22:33  Rate: 102 BPM  Rhythm: sinus tachycardia with marked sinus arrhythmia  Interpretation: No acute ST or T wave elevation. No STEMI.           The Emergency Provider reviewed the vital signs and test results, which are outlined above.     ED Discussion     2:07 AM: Discussed case with Dr. Dunn ([facility]) who agrees with current care and management of pt and accepts admission.   Admitting Service: [facility] medicine   Admitting Physician: Dr. Dunn  Admit to: Obs     2:07 AM: Re-evaluated pt. I have discussed test results, shared treatment plan, and the need for admission with patient and family at bedside. Pt and family express understanding at this time and agree with all information. All questions answered. Pt and family have no further questions or concerns at this time. Pt is ready for admit.           Medical Decision Making:   Clinical Tests:   Lab Tests: Ordered and Reviewed  Radiological Study: Ordered and Reviewed  Medical Tests: Ordered and Reviewed       DDx is broad but  includes: ACS, Intra-abdominal infection, pancreatitis, Volvulus, SBO, Pancreatitis, Cholecystitis, PNA  ED Medication(s):  Medications   pantoprazole injection 40 mg (has no administration in time range)   aluminum-magnesium hydroxide-simethicone 200-200-20 mg/5 mL suspension 30 mL (has no administration in time range)     And   LIDOcaine HCl 2% oral solution 15 mL (has no administration in time range)       New Prescriptions    No medications on file               Scribe Attestation:   Scribe #1: I performed the above scribed service and the documentation accurately describes the services I performed. I attest to the accuracy of the note.     Attending:   Physician Attestation Statement for Scribe #1: I, Serg Chiu MD, personally performed the services described in this documentation, as scribed by Chula Long, in my presence, and it is both accurate and complete.           Clinical Impression       ICD-10-CM ICD-9-CM   1. Elevated troponin  R77.8 790.6   2. Dementia, unspecified dementia severity, unspecified dementia type, unspecified whether behavioral, psychotic, or mood disturbance or anxiety  F03.90 294.20       Disposition:   Disposition: Placed in Observation  Condition: Larissa Chiu MD  06/23/23 5061

## 2023-06-23 NOTE — PHARMACY MED REC
"Admission Medication History     The home medication history was taken by Nikolas Mccain.    You may go to "Admission" then "Reconcile Home Medications" tabs to review and/or act upon these items.     The home medication list has been updated by the Pharmacy department.   Please read ALL comments highlighted in yellow.   Please address this information as you see fit.    Feel free to contact us if you have any questions or require assistance.      Medications listed below were obtained from: [employer]ytic software- [employer] and Medical records  (Not in a hospital admission)        Nikolas Mccain  [id number]    Current Outpatient Medications on File Prior to Encounter   Medication Sig Dispense Refill Last Dose    aspirin (ECOTRIN) 81 MG EC tablet Take 81 mg by mouth once daily.   6/22/2023    cholecalciferol, vitamin D3, 1,000 unit capsule Take 1,000 Units by mouth 2 (two) times daily.   6/22/2023    esomeprazole (NEXIUM) 40 MG capsule Take 40 mg by mouth before dinner.    Past Week    ferrous gluconate (FERGON) 324 MG tablet Take 324 mg by mouth daily with breakfast.   6/22/2023    levothyroxine (SYNTHROID) 112 MCG tablet Take 1 tablet by mouth every morning.   6/22/2023    losartan (COZAAR) 25 MG tablet Take 25 mg by mouth once daily.   6/22/2023    MULTIVIT-IRON-MIN-FOLIC ACID 3,500-18-0.4 UNIT-MG-MG ORAL CHEW Take 1 tablet by mouth once daily.   6/22/2023    rosuvastatin (CRESTOR) 20 MG tablet Take 20 mg by mouth every evening.   Past Week    fluticasone (FLONASE) 50 mcg/actuation nasal spray 1 spray by Each Nare route once daily.       hydroCHLOROthiazide (MICROZIDE) 12.5 mg capsule Take 12.5 mg by mouth once daily.       hydrocortisone 2.5 % cream Apply topically 2 (two) times daily.       tramadol (ULTRAM) 50 mg tablet Take 50 mg by mouth every 6 (six) hours as needed.      .        "

## 2023-06-23 NOTE — HPI
"Mr. Castellanos is a 89 year old male patient whose current medical conditions include AS s/p AVR, prostate cancer, thyroid disease, HTN, hyperlipidemia, and CAD who presented to [facility] ED thsi AM with a chief complaint of left-sided/generalized abdominal discomfort that onset yesterday evening. Associated symptoms included indigestion like discomfort and decreased po intake. Patient denied any associated palpitations, LH, dizziness, fever, chills, nausea, vomiting, or diaphoresis. Took Fanny-[place] to try to relieve symptoms. Initial workup in ED revealed MAGO (creatinine of 2.0), troponin of 0.623, and BNP of 254 and patient was subsequently admitted for further evaluation and treatment. Cardiology consulted to assist with management. Patient seen and examined today, resting in bed with family in room. Feels ok. Denies any overt chest pain or tightness but family does report he has been complaining of "acid reflux" symptoms over the past few days. Patient is compliant with his medications. Followed on OP basis by Dr. Bueno. Troponin 0.623>1.671>3.887. H/H 9.5/28.5, recent FOBT negative. Echo pending. EKG reviewed, no acute ischemic changes appreciated. CT of abdomen with NAF.   "

## 2023-06-23 NOTE — H&P
Formerly [facility], [facility] - Emergency Dept.  [facility] Medicine  History & Physical    Patient Name: Juan Castellanos  MRN: [id number]  Patient Class: OP- Observation  Admission Date: 6/22/2023  Attending Physician: Marciano Barker MD   Primary Care Provider: Devyn Beck MD         Patient information was obtained from patient, relative(s), past medical records and ER records.     Subjective:     Principal Problem:Epigastric pain    Chief Complaint:   Chief Complaint   Patient presents with    Abdominal Pain     Pt presents to ed via aasi c/o lower abdominal pain described as dull, denies N/V/D. Ems was initially called for chest pain but on ems arrival pt states he no longer has chest pain. Denies sob        HPI: Juan Castellanos is a 89 y.o. male with a PMH  has a past medical history of Allergy, Anxiety, Arthritis, Blood transfusion, Cataract, Depression, GERD (gastroesophageal reflux disease), Heart murmur, Hypercholesterolemia, Primary hypertension (11/24/2021), Prostate cancer, and Thyroid disease.  Presented to the ER accompanied by family for evaluation of epigastric/right upper quadrant abdominal pain that radiated to his right lower quadrant/umbilical region.  Patient reports he was sitting down watching that she baseball game and reports that the pain started all of a sudden.  Patient believed it was his acid reflux symptoms.  Patient states he took Fanny-[place] without any relief of his symptoms.  Only reported cardiac history was aortic valve replacement.  Denies any stents or history of mi/bypass.  Currently asymptomatic and denies any other associated symptoms accompanied with abdominal pain.  ER workup showed BUN/creatinine of 30/2.0 (previously 16/1.3), , troponin of 0.623, and EKG showed sinus tach with rate of 102 QT/QTC of 350/456.  Remainder of workup was unremarkable.  Patient received total of 40 Protonix and GI cocktail in ED.  Hospital Medicine consulted to admit patient for cardiac workup.  Patient  family are in agreement with treatment plan.  Patient will be admitted under observation status.  PCP: Devyn Beck        Past Medical History:   Diagnosis Date    Allergy     Anxiety     Arthritis     Blood transfusion     Cataract     Depression     GERD (gastroesophageal reflux disease)     Heart murmur     Hypercholesterolemia     Primary hypertension 11/24/2021    Prostate cancer     Thyroid disease        Past Surgical History:   Procedure Laterality Date    ABDOMINAL HERNIA REPAIR Right     AORTIC VALVE REPLACEMENT      BASAL CELL CARCINOMA EXCISION Right 2022    CARDIAC VALVE SURGERY      PROSTATECTOMY         Review of patient's allergies indicates:   Allergen Reactions    Tomato (solanum lycopersicum)     Grape Rash    [place] Rash       No current facility-administered medications on file prior to encounter.     Current Outpatient Medications on File Prior to Encounter   Medication Sig    aspirin (ECOTRIN) 81 MG EC tablet Take 81 mg by mouth once daily.    cholecalciferol, vitamin D3, 1,000 unit capsule Take 1,000 Units by mouth 2 (two) times daily.    esomeprazole (NEXIUM) 40 MG capsule Take 40 mg by mouth before dinner.     ferrous gluconate (FERGON) 324 MG tablet Take 324 mg by mouth.    levothyroxine (SYNTHROID) 112 MCG tablet Take 1 tablet by mouth every morning.    losartan (COZAAR) 25 MG tablet Take 25 mg by mouth.    MULTIVIT-IRON-MIN-FOLIC ACID 3,500-18-0.4 UNIT-MG-MG ORAL CHEW Take by mouth.    rosuvastatin (CRESTOR) 20 MG tablet Take 20 mg by mouth every evening.    ACETAMINOPHEN (TYLENOL ORAL) Take by mouth as needed.    azelastine (ASTELIN) 137 mcg (0.1 %) nasal spray 1 spray by Nasal route.    CHLORPHENIRAMINE MALEATE (CHLORTABS ORAL) Take by mouth once daily.    fluticasone (FLONASE) 50 mcg/actuation nasal spray 1 spray by Each Nare route once daily.    hydroCHLOROthiazide (HYDRODIURIL) 12.5 MG Tab Take 1 tablet (12.5 mg total) by mouth daily as needed.     hydrocortisone 2.5 % ointment Apply bid for 1-2 weeks    tramadol (ULTRAM) 50 mg tablet Take 50 mg by mouth every 6 (six) hours as needed.     Family History       Problem Relation (Age of Onset)    Cataracts Father    Diabetes Brother          Tobacco Use    Smoking status: Former    Smokeless tobacco: Never    Tobacco comments:     quit 40 years ago   Substance and Sexual Activity    Alcohol use: No    Drug use: No    Sexual activity: Not Currently     Review of Systems   Cardiovascular:  Positive for chest pain. Negative for palpitations.   Gastrointestinal:  Positive for abdominal pain. Negative for nausea and vomiting.   All other systems reviewed and are negative.  Objective:     Vital Signs (Most Recent):  Temp: 98.2 °F (36.8 °C) (06/23/23 0602)  Pulse: 64 (06/23/23 0602)  Resp: 18 (06/23/23 0602)  BP: (!) 105/57 (06/23/23 0602)  SpO2: 96 % (06/23/23 0602) Vital Signs (24h Range):  Temp:  [98.2 °F (36.8 °C)-100.1 °F (37.8 °C)] 98.2 °F (36.8 °C)  Pulse:  [] 64  Resp:  [18-20] 18  SpO2:  [94 %-97 %] 96 %  BP: (102-126)/(50-80) 105/57     Weight: 69.9 kg (154 lb)  Body mass index is 22.1 kg/m².     Physical Exam  Vitals and nursing note reviewed.   Constitutional:       General: He is awake. He is not in acute distress.     Appearance: Normal appearance. He is well-developed and well-groomed. He is not ill-appearing, toxic-appearing or diaphoretic.   HENT:      Head: Normocephalic and atraumatic.   Eyes:      Extraocular Movements: Extraocular movements intact.      Conjunctiva/sclera: Conjunctivae normal.   Cardiovascular:      Rate and Rhythm: Normal rate and regular rhythm.      Pulses: Normal pulses.           Radial pulses are 2+ on the right side and 2+ on the left side.        Dorsalis pedis pulses are 2+ on the right side and 2+ on the left side.      Heart sounds: Murmur heard.   Pulmonary:      Effort: Pulmonary effort is normal.      Breath sounds: Normal breath sounds.   Abdominal:       General: Bowel sounds are normal.      Palpations: Abdomen is soft.      Tenderness: There is no abdominal tenderness.   Musculoskeletal:      Cervical back: Normal range of motion and neck supple.      Comments: 5/5 strength throughout   Skin:     General: Skin is warm and dry.      Capillary Refill: Capillary refill takes less than 2 seconds.   Neurological:      General: No focal deficit present.      Mental Status: He is alert and oriented to person, place, and time. Mental status is at baseline.      GCS: GCS eye subscore is 4. GCS verbal subscore is 5. GCS motor subscore is 6.      Cranial Nerves: Cranial nerves 2-12 are intact.      Motor: Motor function is intact.      Coordination: Coordination is intact.      Deep Tendon Reflexes: Reflexes are normal and symmetric.   Psychiatric:         Mood and Affect: Mood normal.         Behavior: Behavior normal. Behavior is cooperative.            LABS:  Recent Results (from the past 24 hour(s))   CBC auto differential    Collection Time: 06/22/23 11:00 PM   Result Value Ref Range    WBC 12.01 3.90 - 12.70 K/uL    RBC 2.86 (L) 4.60 - 6.20 M/uL    Hemoglobin 9.5 (L) 14.0 - 18.0 g/dL    Hematocrit 28.5 (L) 40.0 - 54.0 %     (H) 82 - 98 fL    MCH 33.2 (H) 27.0 - 31.0 pg    MCHC 33.3 32.0 - 36.0 g/dL    RDW 12.9 11.5 - 14.5 %    Platelets 152 150 - 450 K/uL    MPV 10.5 9.2 - 12.9 fL    Immature Granulocytes 1.0 (H) 0.0 - 0.5 %    Gran # (ANC) 10.9 (H) 1.8 - 7.7 K/uL    Immature Grans (Abs) 0.12 (H) 0.00 - 0.04 K/uL    Lymph # 0.3 (L) 1.0 - 4.8 K/uL    Mono # 0.7 0.3 - 1.0 K/uL    Eos # 0.0 0.0 - 0.5 K/uL    Baso # 0.01 0.00 - 0.20 K/uL    nRBC 0 0 /100 WBC    Gran % 90.6 (H) 38.0 - 73.0 %    Lymph % 2.6 (L) 18.0 - 48.0 %    Mono % 5.7 4.0 - 15.0 %    Eosinophil % 0.0 0.0 - 8.0 %    Basophil % 0.1 0.0 - 1.9 %    Differential Method Automated    Comprehensive metabolic panel    Collection Time: 06/22/23 11:00 PM   Result Value Ref Range    Sodium 140 136 - 145  mmol/L    Potassium 3.9 3.5 - 5.1 mmol/L    Chloride 103 95 - 110 mmol/L    CO2 26 23 - 29 mmol/L    Glucose 132 (H) 70 - 110 mg/dL    BUN 30 (H) 8 - 23 mg/dL    Creatinine 2.0 (H) 0.5 - 1.4 mg/dL    Calcium 9.4 8.7 - 10.5 mg/dL    Total Protein 7.4 6.0 - 8.4 g/dL    Albumin 3.6 3.5 - 5.2 g/dL    Total Bilirubin 0.5 0.1 - 1.0 mg/dL    Alkaline Phosphatase 65 55 - 135 U/L    AST 18 10 - 40 U/L    ALT 11 10 - 44 U/L    eGFR 31 (A) >60 mL/min/1.73 m^2    Anion Gap 11 8 - 16 mmol/L   Lactic acid, plasma    Collection Time: 06/22/23 11:00 PM   Result Value Ref Range    Lactate (Lactic Acid) 1.5 0.5 - 2.2 mmol/L   Lipase    Collection Time: 06/22/23 11:00 PM   Result Value Ref Range    Lipase 16 4 - 60 U/L   Troponin I    Collection Time: 06/22/23 11:00 PM   Result Value Ref Range    Troponin I 0.623 (H) 0.000 - 0.026 ng/mL   Brain natriuretic peptide    Collection Time: 06/22/23 11:00 PM   Result Value Ref Range     (H) 0 - 99 pg/mL   Urinalysis, Reflex to Urine Culture Urine, Clean Catch    Collection Time: 06/22/23 11:55 PM    Specimen: Urine   Result Value Ref Range    Specimen UA Urine, Clean Catch     Color, UA Yellow Yellow, Straw, Elisabeth    Appearance, UA Clear Clear    pH, UA 6.0 5.0 - 8.0    Specific Gravity, UA 1.020 1.005 - 1.030    Protein, UA 1+ (A) Negative    Glucose, UA Negative Negative    Ketones, UA Negative Negative    Bilirubin (UA) Negative Negative    Occult Blood UA 3+ (A) Negative    Nitrite, UA Negative Negative    Urobilinogen, UA Negative <2.0 EU/dL    Leukocytes, UA Negative Negative   Urinalysis Microscopic    Collection Time: 06/22/23 11:55 PM   Result Value Ref Range    RBC, UA 70 (H) 0 - 4 /hpf    WBC, UA 3 0 - 5 /hpf    Bacteria None None-Occ /hpf    Hyaline Casts, UA 1 0-1/lpf /lpf    Microscopic Comment SEE COMMENT    Troponin I    Collection Time: 06/23/23  2:05 AM   Result Value Ref Range    Troponin I 1.671 (H) 0.000 - 0.026 ng/mL   Troponin I    Collection Time: 06/23/23   6:32 AM   Result Value Ref Range    Troponin I 3.887 (H) 0.000 - 0.026 ng/mL       RADIOLOGY  X-Ray Chest AP Portable    Result Date: 6/22/2023  EXAMINATION: XR CHEST AP PORTABLE CLINICAL HISTORY: Abdominal pain; TECHNIQUE: Single frontal view of the chest was performed. COMPARISON: None FINDINGS: Median sternotomy.The lungs are clear, with normal appearance of pulmonary vasculature and no pleural effusion or pneumothorax. The cardiac silhouette is normal in size. The hilar and mediastinal contours are unremarkable. Bones are intact.     No acute abnormality. Electronically signed by: Hugo Garcia Date:    06/22/2023 Time:    22:54      EKG    MICROBIOLOGY    MDM     Amount and/or Complexity of Data Reviewed  Clinical lab tests: reviewed  Tests in the radiology section of CPT®: reviewed  Tests in the medicine section of CPT®: reviewed  Discussion of test results with the performing providers: yes  Decide to obtain previous medical records or to obtain history from someone other than the patient: yes  Obtain history from someone other than the patient: yes  Review and summarize past medical records: yes  Discuss the patient with other providers: yes  Independent visualization of images, tracings, or specimens: yes          Assessment/Plan:     * Epigastric pain  CT abdomen pelvis without acute findings.  Asymptomatic at this time.  Plan:  -monitor for worsening abd pain  -analgesics prn  -continue home medications (PPI/antiacids)      Elevated troponin  Denies cardiopulmonary complaints at this time.  Vital signs stable.  Plan:  -tele monitoring  -trend trops  -repeat ekg prn  -cards consult in am      MAGO (acute kidney injury)  Patient with acute kidney injury likely due to IVVD/dehydration and pre-renal azotemia MAGO is currently being treated. Labs reviewed- Renal function/electrolytes with Estimated Creatinine Clearance: 24.3 mL/min (A) (based on SCr of 2 mg/dL (H)). according to latest data. Monitor urine output  and serial BMP and adjust therapy as needed. Avoid nephrotoxins and renally dose meds for GFR listed above.       Primary hypertension  Currently normotensive. BP usually well controlled per patient with home medications.  Plan:  -Optimize pain control   -Continue home medications (losartan and hctz) when kidney function improves, titrate as needed   -Monitor BP  -Low salt/cardiac diet when not NPO  -IV hydralazine prn for SBP>160 or DBP>90           Hyperlipidemia  Patient is chronically on statin.will continue for now. Last Lipid Panel:   Lab Results   Component Value Date    CHOL 157 06/05/2019    HDL 45 06/05/2019    LDLCALC 69.2 06/05/2019    TRIG 214 (H) 06/05/2019    CHOLHDL 28.7 06/05/2019     Plan:  -Continue home medication  -low fat/low calorie diet        Hypothyroidism  Patient has chronic hypothyroidism. TFTs reviewed-   Lab Results   Component Value Date    TSH 4.0 10/11/2007   . Will continue chronic levothyroxine and adjust for and clinical changes.        S/P AVR (aortic valve replacement)  Followed by Dr. Crane. Voices no complaints.   Plan:  -Continue to monitor      GERD (gastroesophageal reflux disease)  Chronic. Stable. Currently asymptomatic. Home medications include PPI/Antacids as needed.  Plan:  -Continue PPI/Antacids as needed         VTE Risk Mitigation (From admission, onward)         Ordered     enoxaparin injection 30 mg  Daily         06/23/23 0334     IP VTE HIGH RISK PATIENT  Once         06/23/23 0334     Place sequential compression device  Until discontinued         06/23/23 0334                 //Core Measures   -DVT proph: SCDs, lovenox   -Code status Full    -Surrogate:daughter      Components of this note were documented using a voice recognition system and are subject to errors not corrected at the time the document was proof read. Please contact the author for any clarifications.       Brennan Dunn NP  Department of Hospital Medicine  [facility] - Emergency Dept.

## 2023-06-23 NOTE — H&P (VIEW-ONLY)
"[facility]  Cardiology  Consult Note    Patient Name: Juan Castellanos  MRN: [id number]  Admission Date: 6/22/2023  Hospital Length of Stay: 0 days  Code Status: Full Code   Attending Provider: Marciano Barker MD   Consulting Provider: Renetta Rodriguez PA-C  Primary Care Physician: Devyn Beck MD  Principal Problem:Epigastric pain    Patient information was obtained from patient, relative(s), past medical records and ER records.     Inpatient consult to Cardiology  Consult performed by: Renetta Rodriguez PA-C  Consult ordered by: Elke Coy NP        Subjective:     Chief Complaint:  Abdominal pain    HPI:   Mr. Castellanos is a 89 year old male patient whose current medical conditions include AS s/p AVR, prostate cancer, thyroid disease, HTN, hyperlipidemia, and CAD who presented to [facility] ED thsi AM with a chief complaint of left-sided/generalized abdominal discomfort that onset yesterday evening. Associated symptoms included indigestion like discomfort and decreased po intake. Patient denied any associated palpitations, LH, dizziness, fever, chills, nausea, vomiting, or diaphoresis. Took [place] to try to relieve symptoms. Initial workup in ED revealed creatinine of 2.0, troponin of 0.623, and BNP of 254 and patiend subsequently admitted for further evaluation and treatment. Cardiology consulted to assist with management. Patient seen and examined today, resting in bed with family in room. Feels ok. Denies any overt chest pain or tightness but family does report he has been complaining of "acid reflux" symptoms over the past few days. Patient is compliant with his medications. Followed on OP basis by Dr. Bueno. Troponin 0.623>1.671>3.887. H/H 9.5/28.5, recent FOBT negative. Echo pending. EKG reviewed, no acute ischemic changes appreciated. CT of abdomen with NAF.       Past Medical History:   Diagnosis Date    Allergy     Anxiety     Arthritis     Blood transfusion     Cataract     " Depression     GERD (gastroesophageal reflux disease)     Heart murmur     Hypercholesterolemia     Primary hypertension 11/24/2021    Prostate cancer     Thyroid disease        Past Surgical History:   Procedure Laterality Date    ABDOMINAL HERNIA REPAIR Right     AORTIC VALVE REPLACEMENT      BASAL CELL CARCINOMA EXCISION Right 2022    CARDIAC VALVE SURGERY      PROSTATECTOMY         Review of patient's allergies indicates:   Allergen Reactions    Tomato (solanum lycopersicum)     Grape Rash    [place] Rash       No current facility-administered medications on file prior to encounter.     Current Outpatient Medications on File Prior to Encounter   Medication Sig    aspirin (ECOTRIN) 81 MG EC tablet Take 81 mg by mouth once daily.    cholecalciferol, vitamin D3, 1,000 unit capsule Take 1,000 Units by mouth 2 (two) times daily.    esomeprazole (NEXIUM) 40 MG capsule Take 40 mg by mouth before dinner.     ferrous gluconate (FERGON) 324 MG tablet Take 324 mg by mouth daily with breakfast.    levothyroxine (SYNTHROID) 112 MCG tablet Take 1 tablet by mouth every morning.    losartan (COZAAR) 25 MG tablet Take 25 mg by mouth once daily.    MULTIVIT-IRON-MIN-FOLIC ACID 3,500-18-0.4 UNIT-MG-MG ORAL CHEW Take 1 tablet by mouth once daily.    rosuvastatin (CRESTOR) 20 MG tablet Take 20 mg by mouth every evening.    fluticasone (FLONASE) 50 mcg/actuation nasal spray 1 spray by Each Nare route once daily.    hydroCHLOROthiazide (MICROZIDE) 12.5 mg capsule Take 12.5 mg by mouth once daily.    hydrocortisone 2.5 % cream Apply topically 2 (two) times daily.    tramadol (ULTRAM) 50 mg tablet Take 50 mg by mouth every 6 (six) hours as needed.    [DISCONTINUED] ACETAMINOPHEN (TYLENOL ORAL) Take by mouth as needed.    [DISCONTINUED] azelastine (ASTELIN) 137 mcg (0.1 %) nasal spray 1 spray by Nasal route.    [DISCONTINUED] CHLORPHENIRAMINE MALEATE (CHLORTABS ORAL) Take by mouth once daily.    [DISCONTINUED] hydroCHLOROthiazide  (HYDRODIURIL) 12.5 MG Tab Take 1 tablet (12.5 mg total) by mouth daily as needed.    [DISCONTINUED] hydrocortisone 2.5 % ointment Apply bid for 1-2 weeks     Family History       Problem Relation (Age of Onset)    Cataracts Father    Diabetes Brother          Tobacco Use    Smoking status: Former    Smokeless tobacco: Never    Tobacco comments:     quit 40 years ago   Substance and Sexual Activity    Alcohol use: No    Drug use: No    Sexual activity: Not Currently     Review of Systems   Constitutional: Positive for malaise/fatigue.   HENT: Negative.     Eyes: Negative.    Cardiovascular:  Positive for chest pain (acid reflux type discomfort).   Respiratory: Negative.     Endocrine: Negative.    Hematologic/Lymphatic: Negative.    Skin: Negative.    Musculoskeletal: Negative.    Gastrointestinal:  Positive for abdominal pain and heartburn.   Genitourinary: Negative.    Neurological: Negative.    Psychiatric/Behavioral:  Positive for memory loss.    Allergic/Immunologic: Negative.    Objective:     Vital Signs (Most Recent):  Temp: 98.2 °F (36.8 °C) (06/23/23 0602)  Pulse: 64 (06/23/23 0602)  Resp: 18 (06/23/23 0602)  BP: (!) 105/57 (06/23/23 0602)  SpO2: 96 % (06/23/23 0602) Vital Signs (24h Range):  Temp:  [98.2 °F (36.8 °C)-100.1 °F (37.8 °C)] 98.2 °F (36.8 °C)  Pulse:  [] 64  Resp:  [18-20] 18  SpO2:  [94 %-97 %] 96 %  BP: (102-126)/(50-80) 105/57     Weight: 69.9 kg (154 lb)  Body mass index is 22.1 kg/m².    SpO2: 96 %       No intake or output data in the 24 hours ending 06/23/23 0922    Lines/Drains/Airways       Airway  Duration                  Airway - Non-Surgical 07/15/14 1102 Nasal Cannula 3264 days              Peripheral Intravenous Line  Duration                  Peripheral IV - Single Lumen 06/22/23 2208 20 G Anterior;Right Forearm <1 day         Peripheral IV - Single Lumen 06/22/23 2301 18 G Anterior;Left Forearm <1 day                     Physical Exam  Vitals and nursing note reviewed.    Constitutional:       General: He is not in acute distress.     Appearance: Normal appearance. He is well-developed. He is not diaphoretic.   HENT:      Head: Normocephalic and atraumatic.   Eyes:      General:         Right eye: No discharge.         Left eye: No discharge.      Pupils: Pupils are equal, round, and reactive to light.   Neck:      Thyroid: No thyromegaly.      Vascular: No JVD.      Trachea: No tracheal deviation.   Cardiovascular:      Rate and Rhythm: Normal rate and regular rhythm.      Heart sounds: Normal heart sounds, S1 normal and S2 normal. +faint murmur  Pulmonary:      Effort: Pulmonary effort is normal. No respiratory distress.      Breath sounds: Normal breath sounds. No wheezing or rales.   Abdominal:      General: There is no distension.      Tenderness: There is no abdominal tenderness. There is no rebound.   Musculoskeletal:      Cervical back: Neck supple.      Right lower leg: No edema.      Left lower leg: No edema.   Skin:     General: Skin is warm and dry.      Findings: No erythema.   Neurological:      General: No focal deficit present.      Mental Status: He is alert and oriented to person, place, and time.   Psychiatric:         Mood and Affect: Mood normal.         Behavior: Behavior normal.         Thought Content: Thought content normal.        Significant Labs: CMP   Recent Labs   Lab 06/22/23  2300 06/23/23  0632    136   K 3.9 4.2    104   CO2 26 23   * 118*   BUN 30* 32*   CREATININE 2.0* 1.9*   CALCIUM 9.4 9.2   PROT 7.4  --    ALBUMIN 3.6  --    BILITOT 0.5  --    ALKPHOS 65  --    AST 18  --    ALT 11  --    ANIONGAP 11 9   , CBC   Recent Labs   Lab 06/22/23  2300   WBC 12.01   HGB 9.5*   HCT 28.5*      , Troponin   Recent Labs   Lab 06/22/23  2300 06/23/23  0205 06/23/23  0632   TROPONINI 0.623* 1.671* 3.887*   , and All pertinent lab results from the last 24 hours have been reviewed.    Significant Imaging: Echocardiogram:  Transthoracic echo (TTE) complete ([facility]upid Only):   Results for orders placed or performed during the hospital encounter of 11/29/21   Echo   Result Value Ref Range    BSA 1.87 m2    TDI SEPTAL 0.07 m/s    LV LATERAL E/E' RATIO 12.36 m/s    LV SEPTAL E/E' RATIO 19.43 m/s    LA WIDTH 4.41 cm    IVC diameter 1.84 cm    Left Ventricular Outflow Tract Mean Velocity 0.55650581690380 cm/s    Left Ventricular Outflow Tract Mean Gradient 1.69 mmHg    TDI LATERAL 0.11 m/s    PV PEAK VELOCITY 1.55 cm/s    LVIDd 3.96 3.5 - 6.0 cm    IVS 1.04 0.6 - 1.1 cm    Posterior Wall 1.08 0.6 - 1.1 cm    Ao root annulus 2.51 cm    LVIDs 2.54 2.1 - 4.0 cm    FS 36 28 - 44 %    LA volume 110.74 cm3    Sinus 3.11 cm    STJ 3.10 cm    LV mass 135.93 g    LA size 4.80 cm    RVDD 4.55 cm    TAPSE 1.62 cm    Left Ventricle Relative Wall Thickness 0.55 cm    AV mean gradient 15 mmHg    AV valve area 0.94 cm2    AV Velocity Ratio 0.33     AV index (prosthetic) 0.38     E/A ratio 1.06     Mean e' 0.09 m/s    E wave deceleration time [id number] msec    IVRT [id number] msec    Pulm vein S/D ratio 1.06     LVOT diameter 1.78 cm    LVOT area 2.5 cm2    LVOT peak hira 0.85 m/s    LVOT peak VTI 25.00 cm    Ao peak hira 2.60 m/s    Ao VTI 66.4 cm    RVOT peak hira 0.65 m/s    RVOT peak VTI 15.7 cm    LVOT stroke volume 62.18 cm3    AV peak gradient 27 mmHg    PV mean gradient 0.95 mmHg    E/E' ratio 15.11 m/s    MV Peak E Hira 1.36 m/s    TR Max Hira 2.82 m/s    MV Peak A Hira 1.28 m/s    PV Peak S Hira 0.87720058879113 m/s    PV Peak D Hira 0.58 m/s    LV Systolic Volume 23.21 mL    LV Systolic Volume Index 12.3 mL/m2    LV Diastolic Volume 68.23 mL    LV Diastolic Volume Index 36.29 mL/m2    LA Volume Index 58.9 mL/m2    LV Mass Index 72 g/m2    Echo EF Estimated 66 %    RA Major Axis 5.16 cm    Left Atrium Minor Axis 6.18 cm    Left Atrium Major Axis 6.13 cm    Triscuspid Valve Regurgitation Peak Gradient 32 mmHg    LA Volume Index (Mod) 19.8  mL/m2    LA volume (mod) 37.30 cm3    RA Width 3.74 cm    Right Atrial Pressure (from IVC) 8 mmHg    EF 60 %    TV rest pulmonary artery pressure 40 mmHg    Narrative    · The left ventricle is normal in size with concentric remodeling and   normal systolic function.  · Severe left atrial enlargement.  · The estimated ejection fraction is 60%.  · Grade II left ventricular diastolic dysfunction.  · Normal right ventricular size with normal right ventricular systolic   function.  · Mild right atrial enlargement.  · There is a bioprosthetic aortic valve present. There is no aortic aortic   insufficiency present.  · The aortic valve mean gradient is 15 mmHg with a dimensionless index of   0.38.  · There is mild aortic valve stenosis.  · Aortic valve area is 0.94 cm2; peak velocity is 2.60 m/s; mean gradient   is 15 mmHg.  · Mild-to-moderate mitral regurgitation.  · Moderate tricuspid regurgitation.  · Intermediate central venous pressure (8 mmHg).  · The estimated PA systolic pressure is 40 mmHg.     No change from prior study     , EKG: Reviewed, and X-Ray: CXR: X-Ray Chest 1 View (CXR): No results found for this visit on 06/22/23. and X-Ray Chest PA and Lateral (CXR): No results found for this visit on 06/22/23.    Assessment and Plan:   Patient who presents with abdominal pain/NSTEMI. Stable during exam. Continue OMT. Gentle hydration. Check echo. Discussed LHC with patient/family, they are unsure about proceeding. Continue OMT in interim.    * Epigastric pain  -Unclear etiology  -CT of abdomen un-revealing  -Lipase normal  -Mgmt as per primary team    NSTEMI (non-ST elevated myocardial infarction)  -Presents with acid reflux type symptoms over the past few days  -Troponin 0.623>1.671>3.887, repeat pending  -Echo pending  -Continue ASA, statin, heparin gtt  -BP soft, will maximize regimen as tolerated  -Last [facility] 2012 showed 30% RCA and 30% LCX disease  -Discussed invasive mgmt with [facility] once family/pt unsure about  proceeding at present time. Continue OMT in interim.    MAGO (acute kidney injury)  -Creatinine actually at baseline per chart review, oscillates between 1.6-1.9    Elevated troponin  -See plan under NSTEMI    Primary hypertension  -BP soft  -Hold ARB/HCTZ for now    Hyperlipidemia  -Statin    S/P AVR (aortic valve replacement)  -Reassess by echo        VTE Risk Mitigation (From admission, onward)           Ordered     heparin 25,000 units in dextrose 5% (100 units/ml) IV bolus from bag - ADDITIONAL PRN BOLUS - 60 units/kg (max bolus 4000 units)  As needed (PRN)        Question:  Heparin Infusion Adjustment (DO NOT MODIFY ANSWER)  Answer:  [email or web address]\epic\Images\Pharmacy\HeparinInfusions\heparin LOW INTENSITY nomogram for [facility] [id number]    06/23/23 0806     heparin 25,000 units in dextrose 5% (100 units/ml) IV bolus from bag - ADDITIONAL PRN BOLUS - 30 units/kg (max bolus 4000 units)  As needed (PRN)        Question:  Heparin Infusion Adjustment (DO NOT MODIFY ANSWER)  Answer:  [email or web address]\epic\Images\Pharmacy\HeparinInfusions\heparin LOW INTENSITY nomogram for [facility] [id number]    06/23/23 0806     heparin 25,000 units in dextrose 5% (100 units/ml) IV bolus from bag INITIAL BOLUS (max bolus 4000 units)  Once        Question:  Heparin Infusion Adjustment (DO NOT MODIFY ANSWER)  Answer:  [email or web address]\epic\Images\Pharmacy\HeparinInfusions\heparin LOW INTENSITY nomogram for [facility] [id number]    06/23/23 0806     heparin 25,000 units in dextrose 5% 250 mL (100 units/mL) infusion LOW INTENSITY nomogram - [facility]  Continuous        Question Answer Comment   Heparin Infusion Adjustment (DO NOT MODIFY ANSWER) [email or web address]\epic\Images\Pharmacy\HeparinInfusions\heparin LOW INTENSITY nomogram for [facility] [id number]    Begin at (in units/kg/hr) 12        06/23/23 0806     IP VTE HIGH RISK PATIENT  Once         06/23/23 0334     Place sequential compression device  Until discontinued         06/23/23 0334                    Thank you  for your consult. I will follow-up with patient. Please contact us if you have any additional questions.    Renetta Rodriguez PA-C  Cardiology   [facility]

## 2023-06-23 NOTE — ASSESSMENT & PLAN NOTE
Currently normotensive. BP usually well controlled per patient with home medications.  Plan:  -Optimize pain control   -Continue home medications (losartan and hctz) when kidney function improves, titrate as needed   -Monitor BP  -Low salt/cardiac diet when not NPO  -IV hydralazine prn for SBP>160 or DBP>90

## 2023-06-23 NOTE — SUBJECTIVE & OBJECTIVE
Past Medical History:   Diagnosis Date    Allergy     Anxiety     Arthritis     Blood transfusion     Cataract     Depression     GERD (gastroesophageal reflux disease)     Heart murmur     Hypercholesterolemia     Primary hypertension 11/24/2021    Prostate cancer     Thyroid disease        Past Surgical History:   Procedure Laterality Date    ABDOMINAL HERNIA REPAIR Right     AORTIC VALVE REPLACEMENT      BASAL CELL CARCINOMA EXCISION Right 2022    CARDIAC VALVE SURGERY      PROSTATECTOMY         Review of patient's allergies indicates:   Allergen Reactions    Tomato (solanum lycopersicum)     Grape Rash    [place] Rash       No current facility-administered medications on file prior to encounter.     Current Outpatient Medications on File Prior to Encounter   Medication Sig    aspirin (ECOTRIN) 81 MG EC tablet Take 81 mg by mouth once daily.    cholecalciferol, vitamin D3, 1,000 unit capsule Take 1,000 Units by mouth 2 (two) times daily.    esomeprazole (NEXIUM) 40 MG capsule Take 40 mg by mouth before dinner.     ferrous gluconate (FERGON) 324 MG tablet Take 324 mg by mouth.    levothyroxine (SYNTHROID) 112 MCG tablet Take 1 tablet by mouth every morning.    losartan (COZAAR) 25 MG tablet Take 25 mg by mouth.    MULTIVIT-IRON-MIN-FOLIC ACID 3,500-18-0.4 UNIT-MG-MG ORAL CHEW Take by mouth.    rosuvastatin (CRESTOR) 20 MG tablet Take 20 mg by mouth every evening.    ACETAMINOPHEN (TYLENOL ORAL) Take by mouth as needed.    azelastine (ASTELIN) 137 mcg (0.1 %) nasal spray 1 spray by Nasal route.    CHLORPHENIRAMINE MALEATE (CHLORTABS ORAL) Take by mouth once daily.    fluticasone (FLONASE) 50 mcg/actuation nasal spray 1 spray by Each Nare route once daily.    hydroCHLOROthiazide (HYDRODIURIL) 12.5 MG Tab Take 1 tablet (12.5 mg total) by mouth daily as needed.    hydrocortisone 2.5 % ointment Apply bid for 1-2 weeks    tramadol (ULTRAM) 50 mg tablet Take 50 mg by mouth every 6 (six) hours as needed.     Family History        Problem Relation (Age of Onset)    Cataracts Father    Diabetes Brother          Tobacco Use    Smoking status: Former    Smokeless tobacco: Never    Tobacco comments:     quit 40 years ago   Substance and Sexual Activity    Alcohol use: No    Drug use: No    Sexual activity: Not Currently     Review of Systems   Cardiovascular:  Positive for chest pain. Negative for palpitations.   Gastrointestinal:  Positive for abdominal pain. Negative for nausea and vomiting.   All other systems reviewed and are negative.  Objective:     Vital Signs (Most Recent):  Temp: 98.2 °F (36.8 °C) (06/23/23 0602)  Pulse: 64 (06/23/23 0602)  Resp: 18 (06/23/23 0602)  BP: (!) 105/57 (06/23/23 0602)  SpO2: 96 % (06/23/23 0602) Vital Signs (24h Range):  Temp:  [98.2 °F (36.8 °C)-100.1 °F (37.8 °C)] 98.2 °F (36.8 °C)  Pulse:  [] 64  Resp:  [18-20] 18  SpO2:  [94 %-97 %] 96 %  BP: (102-126)/(50-80) 105/57     Weight: 69.9 kg (154 lb)  Body mass index is 22.1 kg/m².     Physical Exam  Vitals and nursing note reviewed.   Constitutional:       General: He is awake. He is not in acute distress.     Appearance: Normal appearance. He is well-developed and well-groomed. He is not ill-appearing, toxic-appearing or diaphoretic.   HENT:      Head: Normocephalic and atraumatic.   Eyes:      Extraocular Movements: Extraocular movements intact.      Conjunctiva/sclera: Conjunctivae normal.   Cardiovascular:      Rate and Rhythm: Normal rate and regular rhythm.      Pulses: Normal pulses.           Radial pulses are 2+ on the right side and 2+ on the left side.        Dorsalis pedis pulses are 2+ on the right side and 2+ on the left side.      Heart sounds: Murmur heard.   Pulmonary:      Effort: Pulmonary effort is normal.      Breath sounds: Normal breath sounds.   Abdominal:      General: Bowel sounds are normal.      Palpations: Abdomen is soft.      Tenderness: There is no abdominal tenderness.   Musculoskeletal:      Cervical back: Normal  range of motion and neck supple.      Comments: 5/5 strength throughout   Skin:     General: Skin is warm and dry.      Capillary Refill: Capillary refill takes less than 2 seconds.   Neurological:      General: No focal deficit present.      Mental Status: He is alert and oriented to person, place, and time. Mental status is at baseline.      GCS: GCS eye subscore is 4. GCS verbal subscore is 5. GCS motor subscore is 6.      Cranial Nerves: Cranial nerves 2-12 are intact.      Motor: Motor function is intact.      Coordination: Coordination is intact.      Deep Tendon Reflexes: Reflexes are normal and symmetric.   Psychiatric:         Mood and Affect: Mood normal.         Behavior: Behavior normal. Behavior is cooperative.            LABS:  Recent Results (from the past 24 hour(s))   CBC auto differential    Collection Time: 06/22/23 11:00 PM   Result Value Ref Range    WBC 12.01 3.90 - 12.70 K/uL    RBC 2.86 (L) 4.60 - 6.20 M/uL    Hemoglobin 9.5 (L) 14.0 - 18.0 g/dL    Hematocrit 28.5 (L) 40.0 - 54.0 %     (H) 82 - 98 fL    MCH 33.2 (H) 27.0 - 31.0 pg    MCHC 33.3 32.0 - 36.0 g/dL    RDW 12.9 11.5 - 14.5 %    Platelets 152 150 - 450 K/uL    MPV 10.5 9.2 - 12.9 fL    Immature Granulocytes 1.0 (H) 0.0 - 0.5 %    Gran # (ANC) 10.9 (H) 1.8 - 7.7 K/uL    Immature Grans (Abs) 0.12 (H) 0.00 - 0.04 K/uL    Lymph # 0.3 (L) 1.0 - 4.8 K/uL    Mono # 0.7 0.3 - 1.0 K/uL    Eos # 0.0 0.0 - 0.5 K/uL    Baso # 0.01 0.00 - 0.20 K/uL    nRBC 0 0 /100 WBC    Gran % 90.6 (H) 38.0 - 73.0 %    Lymph % 2.6 (L) 18.0 - 48.0 %    Mono % 5.7 4.0 - 15.0 %    Eosinophil % 0.0 0.0 - 8.0 %    Basophil % 0.1 0.0 - 1.9 %    Differential Method Automated    Comprehensive metabolic panel    Collection Time: 06/22/23 11:00 PM   Result Value Ref Range    Sodium 140 136 - 145 mmol/L    Potassium 3.9 3.5 - 5.1 mmol/L    Chloride 103 95 - 110 mmol/L    CO2 26 23 - 29 mmol/L    Glucose 132 (H) 70 - 110 mg/dL    BUN 30 (H) 8 - 23 mg/dL     Creatinine 2.0 (H) 0.5 - 1.4 mg/dL    Calcium 9.4 8.7 - 10.5 mg/dL    Total Protein 7.4 6.0 - 8.4 g/dL    Albumin 3.6 3.5 - 5.2 g/dL    Total Bilirubin 0.5 0.1 - 1.0 mg/dL    Alkaline Phosphatase 65 55 - 135 U/L    AST 18 10 - 40 U/L    ALT 11 10 - 44 U/L    eGFR 31 (A) >60 mL/min/1.73 m^2    Anion Gap 11 8 - 16 mmol/L   Lactic acid, plasma    Collection Time: 06/22/23 11:00 PM   Result Value Ref Range    Lactate (Lactic Acid) 1.5 0.5 - 2.2 mmol/L   Lipase    Collection Time: 06/22/23 11:00 PM   Result Value Ref Range    Lipase 16 4 - 60 U/L   Troponin I    Collection Time: 06/22/23 11:00 PM   Result Value Ref Range    Troponin I 0.623 (H) 0.000 - 0.026 ng/mL   Brain natriuretic peptide    Collection Time: 06/22/23 11:00 PM   Result Value Ref Range     (H) 0 - 99 pg/mL   Urinalysis, Reflex to Urine Culture Urine, Clean Catch    Collection Time: 06/22/23 11:55 PM    Specimen: Urine   Result Value Ref Range    Specimen UA Urine, Clean Catch     Color, UA Yellow Yellow, Straw, Elisabeth    Appearance, UA Clear Clear    pH, UA 6.0 5.0 - 8.0    Specific Gravity, UA 1.020 1.005 - 1.030    Protein, UA 1+ (A) Negative    Glucose, UA Negative Negative    Ketones, UA Negative Negative    Bilirubin (UA) Negative Negative    Occult Blood UA 3+ (A) Negative    Nitrite, UA Negative Negative    Urobilinogen, UA Negative <2.0 EU/dL    Leukocytes, UA Negative Negative   Urinalysis Microscopic    Collection Time: 06/22/23 11:55 PM   Result Value Ref Range    RBC, UA 70 (H) 0 - 4 /hpf    WBC, UA 3 0 - 5 /hpf    Bacteria None None-Occ /hpf    Hyaline Casts, UA 1 0-1/lpf /lpf    Microscopic Comment SEE COMMENT    Troponin I    Collection Time: 06/23/23  2:05 AM   Result Value Ref Range    Troponin I 1.671 (H) 0.000 - 0.026 ng/mL   Troponin I    Collection Time: 06/23/23  6:32 AM   Result Value Ref Range    Troponin I 3.887 (H) 0.000 - 0.026 ng/mL       RADIOLOGY  X-Ray Chest AP Portable    Result Date: 6/22/2023  EXAMINATION: XR  CHEST AP PORTABLE CLINICAL HISTORY: Abdominal pain; TECHNIQUE: Single frontal view of the chest was performed. COMPARISON: None FINDINGS: Median sternotomy.The lungs are clear, with normal appearance of pulmonary vasculature and no pleural effusion or pneumothorax. The cardiac silhouette is normal in size. The hilar and mediastinal contours are unremarkable. Bones are intact.     No acute abnormality. Electronically signed by: Hugo Garcia Date:    06/22/2023 Time:    22:54      EKG    MICROBIOLOGY    MDM     Amount and/or Complexity of Data Reviewed  Clinical lab tests: reviewed  Tests in the radiology section of CPT®: reviewed  Tests in the medicine section of CPT®: reviewed  Discussion of test results with the performing providers: yes  Decide to obtain previous medical records or to obtain history from someone other than the patient: yes  Obtain history from someone other than the patient: yes  Review and summarize past medical records: yes  Discuss the patient with other providers: yes  Independent visualization of images, tracings, or specimens: yes

## 2023-06-23 NOTE — CONSULTS
"[facility] - Emergency Dept.  Cardiology  Consult Note    Patient Name: Juan Castellanos  MRN: [id number]  Admission Date: 6/22/2023  Hospital Length of Stay: 0 days  Code Status: Full Code   Attending Provider: Marciano Barker MD   Consulting Provider: Renetta Rodriguez PA-C  Primary Care Physician: Devyn Beck MD  Principal Problem:Epigastric pain    Patient information was obtained from patient, relative(s), past medical records and ER records.     Inpatient consult to Cardiology  Consult performed by: Renetta Rodriguez PA-C  Consult ordered by: Elke Coy NP        Subjective:     Chief Complaint:  Abdominal pain    HPI:   Mr. Castellanos is a 89 year old male patient whose current medical conditions include AS s/p AVR, prostate cancer, thyroid disease, HTN, hyperlipidemia, and CAD who presented to [facility] ED thsi AM with a chief complaint of left-sided/generalized abdominal discomfort that onset yesterday evening. Associated symptoms included indigestion like discomfort and decreased po intake. Patient denied any associated palpitations, LH, dizziness, fever, chills, nausea, vomiting, or diaphoresis. Took Fanny-[place] to try to relieve symptoms. Initial workup in ED revealed creatinine of 2.0, troponin of 0.623, and BNP of 254 and patiend subsequently admitted for further evaluation and treatment. Cardiology consulted to assist with management. Patient seen and examined today, resting in bed with family in room. Feels ok. Denies any overt chest pain or tightness but family does report he has been complaining of "acid reflux" symptoms over the past few days. Patient is compliant with his medications. Followed on OP basis by Dr. Bueno. Troponin 0.623>1.671>3.887. H/H 9.5/28.5, recent FOBT negative. Echo pending. EKG reviewed, no acute ischemic changes appreciated. CT of abdomen with NAF.       Past Medical History:   Diagnosis Date    Allergy     Anxiety     Arthritis     Blood transfusion     Cataract     " Depression     GERD (gastroesophageal reflux disease)     Heart murmur     Hypercholesterolemia     Primary hypertension 11/24/2021    Prostate cancer     Thyroid disease        Past Surgical History:   Procedure Laterality Date    ABDOMINAL HERNIA REPAIR Right     AORTIC VALVE REPLACEMENT      BASAL CELL CARCINOMA EXCISION Right 2022    CARDIAC VALVE SURGERY      PROSTATECTOMY         Review of patient's allergies indicates:   Allergen Reactions    Tomato (solanum lycopersicum)     Grape Rash    [place] Rash       No current facility-administered medications on file prior to encounter.     Current Outpatient Medications on File Prior to Encounter   Medication Sig    aspirin (ECOTRIN) 81 MG EC tablet Take 81 mg by mouth once daily.    cholecalciferol, vitamin D3, 1,000 unit capsule Take 1,000 Units by mouth 2 (two) times daily.    esomeprazole (NEXIUM) 40 MG capsule Take 40 mg by mouth before dinner.     ferrous gluconate (FERGON) 324 MG tablet Take 324 mg by mouth daily with breakfast.    levothyroxine (SYNTHROID) 112 MCG tablet Take 1 tablet by mouth every morning.    losartan (COZAAR) 25 MG tablet Take 25 mg by mouth once daily.    MULTIVIT-IRON-MIN-FOLIC ACID 3,500-18-0.4 UNIT-MG-MG ORAL CHEW Take 1 tablet by mouth once daily.    rosuvastatin (CRESTOR) 20 MG tablet Take 20 mg by mouth every evening.    fluticasone (FLONASE) 50 mcg/actuation nasal spray 1 spray by Each Nare route once daily.    hydroCHLOROthiazide (MICROZIDE) 12.5 mg capsule Take 12.5 mg by mouth once daily.    hydrocortisone 2.5 % cream Apply topically 2 (two) times daily.    tramadol (ULTRAM) 50 mg tablet Take 50 mg by mouth every 6 (six) hours as needed.    [DISCONTINUED] ACETAMINOPHEN (TYLENOL ORAL) Take by mouth as needed.    [DISCONTINUED] azelastine (ASTELIN) 137 mcg (0.1 %) nasal spray 1 spray by Nasal route.    [DISCONTINUED] CHLORPHENIRAMINE MALEATE (CHLORTABS ORAL) Take by mouth once daily.    [DISCONTINUED] hydroCHLOROthiazide  (HYDRODIURIL) 12.5 MG Tab Take 1 tablet (12.5 mg total) by mouth daily as needed.    [DISCONTINUED] hydrocortisone 2.5 % ointment Apply bid for 1-2 weeks     Family History       Problem Relation (Age of Onset)    Cataracts Father    Diabetes Brother          Tobacco Use    Smoking status: Former    Smokeless tobacco: Never    Tobacco comments:     quit 40 years ago   Substance and Sexual Activity    Alcohol use: No    Drug use: No    Sexual activity: Not Currently     Review of Systems   Constitutional: Positive for malaise/fatigue.   HENT: Negative.     Eyes: Negative.    Cardiovascular:  Positive for chest pain (acid reflux type discomfort).   Respiratory: Negative.     Endocrine: Negative.    Hematologic/Lymphatic: Negative.    Skin: Negative.    Musculoskeletal: Negative.    Gastrointestinal:  Positive for abdominal pain and heartburn.   Genitourinary: Negative.    Neurological: Negative.    Psychiatric/Behavioral:  Positive for memory loss.    Allergic/Immunologic: Negative.    Objective:     Vital Signs (Most Recent):  Temp: 98.2 °F (36.8 °C) (06/23/23 0602)  Pulse: 64 (06/23/23 0602)  Resp: 18 (06/23/23 0602)  BP: (!) 105/57 (06/23/23 0602)  SpO2: 96 % (06/23/23 0602) Vital Signs (24h Range):  Temp:  [98.2 °F (36.8 °C)-100.1 °F (37.8 °C)] 98.2 °F (36.8 °C)  Pulse:  [] 64  Resp:  [18-20] 18  SpO2:  [94 %-97 %] 96 %  BP: (102-126)/(50-80) 105/57     Weight: 69.9 kg (154 lb)  Body mass index is 22.1 kg/m².    SpO2: 96 %       No intake or output data in the 24 hours ending 06/23/23 0922    Lines/Drains/Airways       Airway  Duration                  Airway - Non-Surgical 07/15/14 1102 Nasal Cannula 3264 days              Peripheral Intravenous Line  Duration                  Peripheral IV - Single Lumen 06/22/23 2208 20 G Anterior;Right Forearm <1 day         Peripheral IV - Single Lumen 06/22/23 2301 18 G Anterior;Left Forearm <1 day                     Physical Exam  Vitals and nursing note reviewed.    Constitutional:       General: He is not in acute distress.     Appearance: Normal appearance. He is well-developed. He is not diaphoretic.   HENT:      Head: Normocephalic and atraumatic.   Eyes:      General:         Right eye: No discharge.         Left eye: No discharge.      Pupils: Pupils are equal, round, and reactive to light.   Neck:      Thyroid: No thyromegaly.      Vascular: No JVD.      Trachea: No tracheal deviation.   Cardiovascular:      Rate and Rhythm: Normal rate and regular rhythm.      Heart sounds: Normal heart sounds, S1 normal and S2 normal. +faint murmur  Pulmonary:      Effort: Pulmonary effort is normal. No respiratory distress.      Breath sounds: Normal breath sounds. No wheezing or rales.   Abdominal:      General: There is no distension.      Tenderness: There is no abdominal tenderness. There is no rebound.   Musculoskeletal:      Cervical back: Neck supple.      Right lower leg: No edema.      Left lower leg: No edema.   Skin:     General: Skin is warm and dry.      Findings: No erythema.   Neurological:      General: No focal deficit present.      Mental Status: He is alert and oriented to person, place, and time.   Psychiatric:         Mood and Affect: Mood normal.         Behavior: Behavior normal.         Thought Content: Thought content normal.        Significant Labs: CMP   Recent Labs   Lab 06/22/23  2300 06/23/23  0632    136   K 3.9 4.2    104   CO2 26 23   * 118*   BUN 30* 32*   CREATININE 2.0* 1.9*   CALCIUM 9.4 9.2   PROT 7.4  --    ALBUMIN 3.6  --    BILITOT 0.5  --    ALKPHOS 65  --    AST 18  --    ALT 11  --    ANIONGAP 11 9   , CBC   Recent Labs   Lab 06/22/23  2300   WBC 12.01   HGB 9.5*   HCT 28.5*      , Troponin   Recent Labs   Lab 06/22/23  2300 06/23/23  0205 06/23/23  0632   TROPONINI 0.623* 1.671* 3.887*   , and All pertinent lab results from the last 24 hours have been reviewed.    Significant Imaging: Echocardiogram:  Transthoracic echo (TTE) complete (Cupid Only):   Results for orders placed or performed during the hospital encounter of 11/29/21   Echo   Result Value Ref Range    BSA 1.87 m2    TDI SEPTAL 0.07 m/s    LV LATERAL E/E' RATIO 12.36 m/s    LV SEPTAL E/E' RATIO 19.43 m/s    LA WIDTH 4.41 cm    IVC diameter 1.84 cm    Left Ventricular Outflow Tract Mean Velocity 0.46250500177375 cm/s    Left Ventricular Outflow Tract Mean Gradient 1.69 mmHg    TDI LATERAL 0.11 m/s    PV PEAK VELOCITY 1.55 cm/s    LVIDd 3.96 3.5 - 6.0 cm    IVS 1.04 0.6 - 1.1 cm    Posterior Wall 1.08 0.6 - 1.1 cm    Ao root annulus 2.51 cm    LVIDs 2.54 2.1 - 4.0 cm    FS 36 28 - 44 %    LA volume 110.74 cm3    Sinus 3.11 cm    STJ 3.10 cm    LV mass 135.93 g    LA size 4.80 cm    RVDD 4.55 cm    TAPSE 1.62 cm    Left Ventricle Relative Wall Thickness 0.55 cm    AV mean gradient 15 mmHg    AV valve area 0.94 cm2    AV Velocity Ratio 0.33     AV index (prosthetic) 0.38     E/A ratio 1.06     Mean e' 0.09 m/s    E wave deceleration time 317.038495356614070 msec    IVRT 91.217725392690844 msec    Pulm vein S/D ratio 1.06     LVOT diameter 1.78 cm    LVOT area 2.5 cm2    LVOT peak hira 0.85 m/s    LVOT peak VTI 25.00 cm    Ao peak hira 2.60 m/s    Ao VTI 66.4 cm    RVOT peak hira 0.65 m/s    RVOT peak VTI 15.7 cm    LVOT stroke volume 62.18 cm3    AV peak gradient 27 mmHg    PV mean gradient 0.95 mmHg    E/E' ratio 15.11 m/s    MV Peak E Hira 1.36 m/s    TR Max Hira 2.82 m/s    MV Peak A Hira 1.28 m/s    PV Peak S Hira 0.79619844004481 m/s    PV Peak D Hira 0.58 m/s    LV Systolic Volume 23.21 mL    LV Systolic Volume Index 12.3 mL/m2    LV Diastolic Volume 68.23 mL    LV Diastolic Volume Index 36.29 mL/m2    LA Volume Index 58.9 mL/m2    LV Mass Index 72 g/m2    Echo EF Estimated 66 %    RA Major Axis 5.16 cm    Left Atrium Minor Axis 6.18 cm    Left Atrium Major Axis 6.13 cm    Triscuspid Valve Regurgitation Peak Gradient 32 mmHg    LA Volume Index (Mod) 19.8  mL/m2    LA volume (mod) 37.30 cm3    RA Width 3.74 cm    Right Atrial Pressure (from IVC) 8 mmHg    EF 60 %    TV rest pulmonary artery pressure 40 mmHg    Narrative    · The left ventricle is normal in size with concentric remodeling and   normal systolic function.  · Severe left atrial enlargement.  · The estimated ejection fraction is 60%.  · Grade II left ventricular diastolic dysfunction.  · Normal right ventricular size with normal right ventricular systolic   function.  · Mild right atrial enlargement.  · There is a bioprosthetic aortic valve present. There is no aortic aortic   insufficiency present.  · The aortic valve mean gradient is 15 mmHg with a dimensionless index of   0.38.  · There is mild aortic valve stenosis.  · Aortic valve area is 0.94 cm2; peak velocity is 2.60 m/s; mean gradient   is 15 mmHg.  · Mild-to-moderate mitral regurgitation.  · Moderate tricuspid regurgitation.  · Intermediate central venous pressure (8 mmHg).  · The estimated PA systolic pressure is 40 mmHg.     No change from prior study     , EKG: Reviewed, and X-Ray: CXR: X-Ray Chest 1 View (CXR): No results found for this visit on 06/22/23. and X-Ray Chest PA and Lateral (CXR): No results found for this visit on 06/22/23.    Assessment and Plan:   Patient who presents with abdominal pain/NSTEMI. Stable during exam. Continue OMT. Gentle hydration. Check echo. Discussed LHC with patient/family, they are unsure about proceeding. Continue OMT in interim.    * Epigastric pain  -Unclear etiology  -CT of abdomen un-revealing  -Lipase normal  -Mgmt as per primary team    NSTEMI (non-ST elevated myocardial infarction)  -Presents with acid reflux type symptoms over the past few days  -Troponin 0.623>1.671>3.887, repeat pending  -Echo pending  -Continue ASA, statin, heparin gtt  -BP soft, will maximize regimen as tolerated  -Last [facility] 2012 showed 30% RCA and 30% LCX disease  -Discussed invasive mgmt with [facility] once family/pt unsure about  proceeding at present time. Continue OMT in interim.    MAGO (acute kidney injury)  -Creatinine actually at baseline per chart review, oscillates between 1.6-1.9    Elevated troponin  -See plan under NSTEMI    Primary hypertension  -BP soft  -Hold ARB/HCTZ for now    Hyperlipidemia  -Statin    S/P AVR (aortic valve replacement)  -Reassess by echo        VTE Risk Mitigation (From admission, onward)           Ordered     heparin 25,000 units in dextrose 5% (100 units/ml) IV bolus from bag - ADDITIONAL PRN BOLUS - 60 units/kg (max bolus 4000 units)  As needed (PRN)        Question:  Heparin Infusion Adjustment (DO NOT MODIFY ANSWER)  Answer:  [email or web address]\epic\Images\Pharmacy\HeparinInfusions\heparin LOW INTENSITY nomogram for [facility] [id number]    06/23/23 0806     heparin 25,000 units in dextrose 5% (100 units/ml) IV bolus from bag - ADDITIONAL PRN BOLUS - 30 units/kg (max bolus 4000 units)  As needed (PRN)        Question:  Heparin Infusion Adjustment (DO NOT MODIFY ANSWER)  Answer:  [email or web address]\epic\Images\Pharmacy\HeparinInfusions\heparin LOW INTENSITY nomogram for [facility] [id number]    06/23/23 0806     heparin 25,000 units in dextrose 5% (100 units/ml) IV bolus from bag INITIAL BOLUS (max bolus 4000 units)  Once        Question:  Heparin Infusion Adjustment (DO NOT MODIFY ANSWER)  Answer:  [email or web address]\epic\Images\Pharmacy\HeparinInfusions\heparin LOW INTENSITY nomogram for [facility] [id number].pdf    06/23/23 0806     heparin 25,000 units in dextrose 5% 250 mL (100 units/mL) infusion LOW INTENSITY nomogram - [facility]  Continuous        Question Answer Comment   Heparin Infusion Adjustment (DO NOT MODIFY ANSWER) [email or web address]\epic\Images\Pharmacy\HeparinInfusions\heparin LOW INTENSITY nomogram for [facility] [id number]    Begin at (in units/kg/hr) 12        06/23/23 0806     IP VTE HIGH RISK PATIENT  Once         06/23/23 0334     Place sequential compression device  Until discontinued         06/23/23 0334                    Thank you  for your consult. I will follow-up with patient. Please contact us if you have any additional questions.    Renetta Rodriguez PA-C  Cardiology   [facility] - Emergency Dept.

## 2023-06-23 NOTE — CARE UPDATE
Patient admitted by on call team this morning.  He was noted to be having an NSTEMI with increasing troponins.  Patient was started on a heparin drip, but family states he started shaking and were concerned he was having a reaction. Spoke with patient and daughter at bedside and they stated it cam on and stopped suddenly. Heparin was held and cardiology contacted to see how they would like to proceed.  Per cardiologynote, family unsure about proceeding with the [facility] and would like to try conservative management.    Of note, renal function is at baseline as per Dr. COSME quiroz ([facility]) last seen in 2022 states baseline is between 1.6-1.9. He also complained of leg/hip cramps.  He was started on gentle hydration and robaxin x 1 ordered.

## 2023-06-23 NOTE — HPI
Juan Castellanos is a 89 y.o. male with a PMH  has a past medical history of Allergy, Anxiety, Arthritis, Blood transfusion, Cataract, Depression, GERD (gastroesophageal reflux disease), Heart murmur, Hypercholesterolemia, Primary hypertension (11/24/2021), Prostate cancer, and Thyroid disease.  Presented to the ER accompanied by family for evaluation of epigastric/right upper quadrant abdominal pain that radiated to his right lower quadrant/umbilical region.  Patient reports he was sitting down watching that she baseball game and reports that the pain started all of a sudden.  Patient believed it was his acid reflux symptoms.  Patient states he took Fanny-[place] without any relief of his symptoms.  Only reported cardiac history was aortic valve replacement.  Denies any stents or history of mi/bypass.  Currently asymptomatic and denies any other associated symptoms accompanied with abdominal pain.  ER workup showed BUN/creatinine of 30/2.0 (previously 16/1.3), , troponin of 0.623, and EKG showed sinus tach with rate of 102 QT/QTC of 350/456.  Remainder of workup was unremarkable.  Patient received total of 40 Protonix and GI cocktail in ED.  Hospital Medicine consulted to admit patient for cardiac workup.  Patient family are in agreement with treatment plan.  Patient will be admitted under observation status.  PCP: Devyn Beck

## 2023-06-23 NOTE — ASSESSMENT & PLAN NOTE
CT abdomen pelvis without acute findings.  Asymptomatic at this time.  Plan:  -monitor for worsening abd pain  -analgesics prn  -continue home medications (PPI/antiacids)

## 2023-06-23 NOTE — SUBJECTIVE & OBJECTIVE
Past Medical History:   Diagnosis Date    Allergy     Anxiety     Arthritis     Blood transfusion     Cataract     Depression     GERD (gastroesophageal reflux disease)     Heart murmur     Hypercholesterolemia     Primary hypertension 11/24/2021    Prostate cancer     Thyroid disease        Past Surgical History:   Procedure Laterality Date    ABDOMINAL HERNIA REPAIR Right     AORTIC VALVE REPLACEMENT      BASAL CELL CARCINOMA EXCISION Right 2022    CARDIAC VALVE SURGERY      PROSTATECTOMY         Review of patient's allergies indicates:   Allergen Reactions    Tomato (solanum lycopersicum)     Grape Rash    [place] Rash       No current facility-administered medications on file prior to encounter.     Current Outpatient Medications on File Prior to Encounter   Medication Sig    aspirin (ECOTRIN) 81 MG EC tablet Take 81 mg by mouth once daily.    cholecalciferol, vitamin D3, 1,000 unit capsule Take 1,000 Units by mouth 2 (two) times daily.    esomeprazole (NEXIUM) 40 MG capsule Take 40 mg by mouth before dinner.     ferrous gluconate (FERGON) 324 MG tablet Take 324 mg by mouth daily with breakfast.    levothyroxine (SYNTHROID) 112 MCG tablet Take 1 tablet by mouth every morning.    losartan (COZAAR) 25 MG tablet Take 25 mg by mouth once daily.    MULTIVIT-IRON-MIN-FOLIC ACID 3,500-18-0.4 UNIT-MG-MG ORAL CHEW Take 1 tablet by mouth once daily.    rosuvastatin (CRESTOR) 20 MG tablet Take 20 mg by mouth every evening.    fluticasone (FLONASE) 50 mcg/actuation nasal spray 1 spray by Each Nare route once daily.    hydroCHLOROthiazide (MICROZIDE) 12.5 mg capsule Take 12.5 mg by mouth once daily.    hydrocortisone 2.5 % cream Apply topically 2 (two) times daily.    tramadol (ULTRAM) 50 mg tablet Take 50 mg by mouth every 6 (six) hours as needed.    [DISCONTINUED] ACETAMINOPHEN (TYLENOL ORAL) Take by mouth as needed.    [DISCONTINUED] azelastine (ASTELIN) 137 mcg (0.1 %) nasal spray 1 spray by Nasal route.    [DISCONTINUED]  CHLORPHENIRAMINE MALEATE (CHLORTABS ORAL) Take by mouth once daily.    [DISCONTINUED] hydroCHLOROthiazide (HYDRODIURIL) 12.5 MG Tab Take 1 tablet (12.5 mg total) by mouth daily as needed.    [DISCONTINUED] hydrocortisone 2.5 % ointment Apply bid for 1-2 weeks     Family History       Problem Relation (Age of Onset)    Cataracts Father    Diabetes Brother          Tobacco Use    Smoking status: Former    Smokeless tobacco: Never    Tobacco comments:     quit 40 years ago   Substance and Sexual Activity    Alcohol use: No    Drug use: No    Sexual activity: Not Currently     Review of Systems   Constitutional: Positive for malaise/fatigue.   HENT: Negative.     Eyes: Negative.    Cardiovascular:  Positive for chest pain (acid reflux type discomfort).   Respiratory: Negative.     Endocrine: Negative.    Hematologic/Lymphatic: Negative.    Skin: Negative.    Musculoskeletal: Negative.    Gastrointestinal:  Positive for abdominal pain and heartburn.   Genitourinary: Negative.    Neurological: Negative.    Psychiatric/Behavioral:  Positive for memory loss.    Allergic/Immunologic: Negative.    Objective:     Vital Signs (Most Recent):  Temp: 98.2 °F (36.8 °C) (06/23/23 0602)  Pulse: 64 (06/23/23 0602)  Resp: 18 (06/23/23 0602)  BP: (!) 105/57 (06/23/23 0602)  SpO2: 96 % (06/23/23 0602) Vital Signs (24h Range):  Temp:  [98.2 °F (36.8 °C)-100.1 °F (37.8 °C)] 98.2 °F (36.8 °C)  Pulse:  [] 64  Resp:  [18-20] 18  SpO2:  [94 %-97 %] 96 %  BP: (102-126)/(50-80) 105/57     Weight: 69.9 kg (154 lb)  Body mass index is 22.1 kg/m².    SpO2: 96 %       No intake or output data in the 24 hours ending 06/23/23 0922    Lines/Drains/Airways       Airway  Duration                  Airway - Non-Surgical 07/15/14 1102 Nasal Cannula 3264 days              Peripheral Intravenous Line  Duration                  Peripheral IV - Single Lumen 06/22/23 2208 20 G Anterior;Right Forearm <1 day         Peripheral IV - Single Lumen 06/22/23 2571  18 G Anterior;Left Forearm <1 day                     Physical Exam  Vitals and nursing note reviewed.   Constitutional:       General: He is not in acute distress.     Appearance: Normal appearance. He is well-developed. He is not diaphoretic.   HENT:      Head: Normocephalic and atraumatic.   Eyes:      General:         Right eye: No discharge.         Left eye: No discharge.      Pupils: Pupils are equal, round, and reactive to light.   Neck:      Thyroid: No thyromegaly.      Vascular: No JVD.      Trachea: No tracheal deviation.   Cardiovascular:      Rate and Rhythm: Normal rate and regular rhythm.      Heart sounds: Normal heart sounds, S1 normal and S2 normal. No murmur heard.  Pulmonary:      Effort: Pulmonary effort is normal. No respiratory distress.      Breath sounds: Normal breath sounds. No wheezing or rales.   Abdominal:      General: There is no distension.      Tenderness: There is no abdominal tenderness. There is no rebound.   Musculoskeletal:      Cervical back: Neck supple.      Right lower leg: No edema.      Left lower leg: No edema.   Skin:     General: Skin is warm and dry.      Findings: No erythema.   Neurological:      General: No focal deficit present.      Mental Status: He is alert and oriented to person, place, and time.   Psychiatric:         Mood and Affect: Mood normal.         Behavior: Behavior normal.         Thought Content: Thought content normal.        Significant Labs: CMP   Recent Labs   Lab 06/22/23  2300 06/23/23  0632    136   K 3.9 4.2    104   CO2 26 23   * 118*   BUN 30* 32*   CREATININE 2.0* 1.9*   CALCIUM 9.4 9.2   PROT 7.4  --    ALBUMIN 3.6  --    BILITOT 0.5  --    ALKPHOS 65  --    AST 18  --    ALT 11  --    ANIONGAP 11 9   , CBC   Recent Labs   Lab 06/22/23  2300   WBC 12.01   HGB 9.5*   HCT 28.5*      , Troponin   Recent Labs   Lab 06/22/23  2300 06/23/23  0205 06/23/23  0632   TROPONINI 0.623* 1.671* 3.887*   , and All pertinent  lab results from the last 24 hours have been reviewed.    Significant Imaging: Echocardiogram: Transthoracic echo (TTE) complete (Cupid Only):   Results for orders placed or performed during the hospital encounter of 11/29/21   Echo   Result Value Ref Range    BSA 1.87 m2    TDI SEPTAL 0.07 m/s    LV LATERAL E/E' RATIO 12.36 m/s    LV SEPTAL E/E' RATIO 19.43 m/s    LA WIDTH 4.41 cm    IVC diameter 1.84 cm    Left Ventricular Outflow Tract Mean Velocity 0.38189170141846 cm/s    Left Ventricular Outflow Tract Mean Gradient 1.69 mmHg    TDI LATERAL 0.11 m/s    PV PEAK VELOCITY 1.55 cm/s    LVIDd 3.96 3.5 - 6.0 cm    IVS 1.04 0.6 - 1.1 cm    Posterior Wall 1.08 0.6 - 1.1 cm    Ao root annulus 2.51 cm    LVIDs 2.54 2.1 - 4.0 cm    FS 36 28 - 44 %    LA volume 110.74 cm3    Sinus 3.11 cm    STJ 3.10 cm    LV mass 135.93 g    LA size 4.80 cm    RVDD 4.55 cm    TAPSE 1.62 cm    Left Ventricle Relative Wall Thickness 0.55 cm    AV mean gradient 15 mmHg    AV valve area 0.94 cm2    AV Velocity Ratio 0.33     AV index (prosthetic) 0.38     E/A ratio 1.06     Mean e' 0.09 m/s    E wave deceleration time 317.550463555626992 msec    IVRT 91.138855997305592 msec    Pulm vein S/D ratio 1.06     LVOT diameter 1.78 cm    LVOT area 2.5 cm2    LVOT peak hira 0.85 m/s    LVOT peak VTI 25.00 cm    Ao peak hira 2.60 m/s    Ao VTI 66.4 cm    RVOT peak hira 0.65 m/s    RVOT peak VTI 15.7 cm    LVOT stroke volume 62.18 cm3    AV peak gradient 27 mmHg    PV mean gradient 0.95 mmHg    E/E' ratio 15.11 m/s    MV Peak E Hira 1.36 m/s    TR Max Hira 2.82 m/s    MV Peak A Hira 1.28 m/s    PV Peak S Hira 0.48798686672232 m/s    PV Peak D Hira 0.58 m/s    LV Systolic Volume 23.21 mL    LV Systolic Volume Index 12.3 mL/m2    LV Diastolic Volume 68.23 mL    LV Diastolic Volume Index 36.29 mL/m2    LA Volume Index 58.9 mL/m2    LV Mass Index 72 g/m2    Echo EF Estimated 66 %    RA Major Axis 5.16 cm    Left Atrium Minor Axis 6.18 cm    Left Atrium Major Axis  6.13 cm    Triscuspid Valve Regurgitation Peak Gradient 32 mmHg    LA Volume Index (Mod) 19.8 mL/m2    LA volume (mod) 37.30 cm3    RA Width 3.74 cm    Right Atrial Pressure (from IVC) 8 mmHg    EF 60 %    TV rest pulmonary artery pressure 40 mmHg    Narrative    · The left ventricle is normal in size with concentric remodeling and   normal systolic function.  · Severe left atrial enlargement.  · The estimated ejection fraction is 60%.  · Grade II left ventricular diastolic dysfunction.  · Normal right ventricular size with normal right ventricular systolic   function.  · Mild right atrial enlargement.  · There is a bioprosthetic aortic valve present. There is no aortic aortic   insufficiency present.  · The aortic valve mean gradient is 15 mmHg with a dimensionless index of   0.38.  · There is mild aortic valve stenosis.  · Aortic valve area is 0.94 cm2; peak velocity is 2.60 m/s; mean gradient   is 15 mmHg.  · Mild-to-moderate mitral regurgitation.  · Moderate tricuspid regurgitation.  · Intermediate central venous pressure (8 mmHg).  · The estimated PA systolic pressure is 40 mmHg.     No change from prior study     , EKG: Reviewed, and X-Ray: CXR: X-Ray Chest 1 View (CXR): No results found for this visit on 06/22/23. and X-Ray Chest PA and Lateral (CXR): No results found for this visit on 06/22/23.

## 2023-06-23 NOTE — ASSESSMENT & PLAN NOTE
Patient is chronically on statin.will continue for now. Last Lipid Panel:   Lab Results   Component Value Date    CHOL 157 06/05/2019    HDL 45 06/05/2019    LDLCALC 69.2 06/05/2019    TRIG 214 (H) 06/05/2019    CHOLHDL 28.7 06/05/2019     Plan:  -Continue home medication  -low fat/low calorie diet

## 2023-06-23 NOTE — ASSESSMENT & PLAN NOTE
Patient with acute kidney injury likely due to IVVD/dehydration and pre-renal azotemia MAGO is currently being treated. Labs reviewed- Renal function/electrolytes with Estimated Creatinine Clearance: 24.3 mL/min (A) (based on SCr of 2 mg/dL (H)). according to latest data. Monitor urine output and serial BMP and adjust therapy as needed. Avoid nephrotoxins and renally dose meds for GFR listed above.

## 2023-06-23 NOTE — ASSESSMENT & PLAN NOTE
Patient has chronic hypothyroidism. TFTs reviewed-   Lab Results   Component Value Date    TSH 4.0 10/11/2007   . Will continue chronic levothyroxine and adjust for and clinical changes.

## 2023-06-24 PROBLEM — R78.81 BACTEREMIA: Status: ACTIVE | Noted: 2023-06-24

## 2023-06-24 LAB
ACINETOBACTER CALCOACETICUS/BAUMANNII COMPLEX: NOT DETECTED
ANION GAP SERPL CALC-SCNC: 10 MMOL/L (ref 8–16)
APTT PPP: 65 SEC (ref 21–32)
BACTEROIDES FRAGILIS: NOT DETECTED
BASOPHILS NFR BLD: 0 % (ref 0–1.9)
BUN SERPL-MCNC: 39 MG/DL (ref 8–23)
CALCIUM SERPL-MCNC: 8.7 MG/DL (ref 8.7–10.5)
CANDIDA ALBICANS: NOT DETECTED
CANDIDA AURIS: NOT DETECTED
CANDIDA GLABRATA: NOT DETECTED
CANDIDA KRUSEI: NOT DETECTED
CANDIDA PARAPSILOSIS: NOT DETECTED
CANDIDA TROPICALIS: NOT DETECTED
CHLORIDE SERPL-SCNC: 103 MMOL/L (ref 95–110)
CO2 SERPL-SCNC: 23 MMOL/L (ref 23–29)
CREAT SERPL-MCNC: 1.9 MG/DL (ref 0.5–1.4)
CRYPTOCOCCUS NEOFORMANS/GATTII: NOT DETECTED
CTX-M GENE: ABNORMAL
DIFFERENTIAL METHOD: ABNORMAL
ENTEROBACTER CLOACAE COMPLEX: NOT DETECTED
ENTEROBACTERALES: NOT DETECTED
ENTEROCOCCUS FAECALIS: NOT DETECTED
ENTEROCOCCUS FAECIUM: NOT DETECTED
EOSINOPHIL NFR BLD: 0 % (ref 0–8)
ERYTHROCYTE [DISTWIDTH] IN BLOOD BY AUTOMATED COUNT: 13 % (ref 11.5–14.5)
ESCHERICHIA COLI: NOT DETECTED
EST. GFR  (NO RACE VARIABLE): 33 ML/MIN/1.73 M^2
GLUCOSE SERPL-MCNC: 107 MG/DL (ref 70–110)
HAEMOPHILUS INFLUENZAE: NOT DETECTED
HCT VFR BLD AUTO: 24.5 % (ref 40–54)
HGB BLD-MCNC: 8.2 G/DL (ref 14–18)
IMM GRANULOCYTES # BLD AUTO: ABNORMAL K/UL (ref 0–0.04)
IMM GRANULOCYTES NFR BLD AUTO: ABNORMAL % (ref 0–0.5)
IMP GENE: ABNORMAL
KLEBSIELLA AEROGENES: NOT DETECTED
KLEBSIELLA OXYTOCA: NOT DETECTED
KLEBSIELLA PNEUMONIAE GROUP: NOT DETECTED
KPC: ABNORMAL
LISTERIA MONOCYTOGENES: NOT DETECTED
LYMPHOCYTES NFR BLD: 4 % (ref 18–48)
MCH RBC QN AUTO: 33.5 PG (ref 27–31)
MCHC RBC AUTO-ENTMCNC: 33.5 G/DL (ref 32–36)
MCR-1: ABNORMAL
MCV RBC AUTO: 100 FL (ref 82–98)
MEC A/C AND MREJ (MRSA): ABNORMAL
MEC A/C: ABNORMAL
METAMYELOCYTES NFR BLD MANUAL: 3 %
MONOCYTES NFR BLD: 7 % (ref 4–15)
MYELOCYTES NFR BLD MANUAL: 1 %
NDM GENE: ABNORMAL
NEISSERIA MENINGITIDIS: NOT DETECTED
NEUTROPHILS NFR BLD: 78 % (ref 38–73)
NEUTS BAND NFR BLD MANUAL: 7 %
NRBC BLD-RTO: 0 /100 WBC
OXA-48-LIKE: ABNORMAL
PLATELET # BLD AUTO: 118 K/UL (ref 150–450)
PMV BLD AUTO: 11.2 FL (ref 9.2–12.9)
POCT GLUCOSE: 141 MG/DL (ref 70–110)
POCT GLUCOSE: 93 MG/DL (ref 70–110)
POTASSIUM SERPL-SCNC: 3.9 MMOL/L (ref 3.5–5.1)
PROTEUS SPECIES: NOT DETECTED
PSEUDOMONAS AERUGINOSA: NOT DETECTED
RBC # BLD AUTO: 2.45 M/UL (ref 4.6–6.2)
SALMONELLA SP: NOT DETECTED
SERRATIA MARCESCENS: NOT DETECTED
SODIUM SERPL-SCNC: 136 MMOL/L (ref 136–145)
STAPHYLOCOCCUS AUREUS: NOT DETECTED
STAPHYLOCOCCUS EPIDERMIDIS: NOT DETECTED
STAPHYLOCOCCUS LUGDUNESIS: NOT DETECTED
STAPHYLOCOCCUS SPECIES: NOT DETECTED
STENOTROPHOMONAS MALTOPHILIA: NOT DETECTED
STREPTOCOCCUS AGALACTIAE: NOT DETECTED
STREPTOCOCCUS PNEUMONIAE: NOT DETECTED
STREPTOCOCCUS PYOGENES: NOT DETECTED
STREPTOCOCCUS SPECIES: DETECTED
TOXIC GRANULES BLD QL SMEAR: PRESENT
VAN A/B: ABNORMAL
VIM GENE: ABNORMAL
WBC # BLD AUTO: 16.07 K/UL (ref 3.9–12.7)

## 2023-06-24 PROCEDURE — 25000003 PHARM REV CODE 250: Performed by: NURSE PRACTITIONER

## 2023-06-24 PROCEDURE — 96367 TX/PROPH/DG ADDL SEQ IV INF: CPT

## 2023-06-24 PROCEDURE — 25000003 PHARM REV CODE 250: Performed by: FAMILY MEDICINE

## 2023-06-24 PROCEDURE — G0378 HOSPITAL OBSERVATION PER HR: HCPCS

## 2023-06-24 PROCEDURE — 96366 THER/PROPH/DIAG IV INF ADDON: CPT

## 2023-06-24 PROCEDURE — 94761 N-INVAS EAR/PLS OXIMETRY MLT: CPT

## 2023-06-24 PROCEDURE — 85027 COMPLETE CBC AUTOMATED: CPT | Performed by: NURSE PRACTITIONER

## 2023-06-24 PROCEDURE — 96361 HYDRATE IV INFUSION ADD-ON: CPT

## 2023-06-24 PROCEDURE — 99233 SBSQ HOSP IP/OBS HIGH 50: CPT | Mod: ,,, | Performed by: INTERNAL MEDICINE

## 2023-06-24 PROCEDURE — 36415 COLL VENOUS BLD VENIPUNCTURE: CPT | Performed by: INTERNAL MEDICINE

## 2023-06-24 PROCEDURE — 63600175 PHARM REV CODE 636 W HCPCS: Performed by: INTERNAL MEDICINE

## 2023-06-24 PROCEDURE — 85007 BL SMEAR W/DIFF WBC COUNT: CPT | Mod: NCS | Performed by: NURSE PRACTITIONER

## 2023-06-24 PROCEDURE — 85730 THROMBOPLASTIN TIME PARTIAL: CPT | Performed by: NURSE PRACTITIONER

## 2023-06-24 PROCEDURE — 63600175 PHARM REV CODE 636 W HCPCS: Performed by: NURSE PRACTITIONER

## 2023-06-24 PROCEDURE — 25000003 PHARM REV CODE 250: Performed by: HOSPITALIST

## 2023-06-24 PROCEDURE — 80048 BASIC METABOLIC PNL TOTAL CA: CPT | Performed by: INTERNAL MEDICINE

## 2023-06-24 PROCEDURE — 36415 COLL VENOUS BLD VENIPUNCTURE: CPT | Performed by: NURSE PRACTITIONER

## 2023-06-24 PROCEDURE — 99233 PR SUBSEQUENT HOSPITAL CARE,LEVL III: ICD-10-PCS | Mod: ,,, | Performed by: INTERNAL MEDICINE

## 2023-06-24 PROCEDURE — 25000003 PHARM REV CODE 250: Performed by: INTERNAL MEDICINE

## 2023-06-24 RX ORDER — CLOPIDOGREL BISULFATE 75 MG/1
75 TABLET ORAL DAILY
Status: DISCONTINUED | OUTPATIENT
Start: 2023-06-24 | End: 2023-06-28 | Stop reason: HOSPADM

## 2023-06-24 RX ORDER — ESOMEPRAZOLE MAGNESIUM 40 MG/1
40 CAPSULE, DELAYED RELEASE ORAL
Qty: 30 CAPSULE | Refills: 0 | Status: SHIPPED | OUTPATIENT
Start: 2023-06-24 | End: 2023-06-28 | Stop reason: SDUPTHER

## 2023-06-24 RX ORDER — NAPROXEN SODIUM 220 MG/1
81 TABLET, FILM COATED ORAL DAILY
Status: DISCONTINUED | OUTPATIENT
Start: 2023-06-24 | End: 2023-06-28 | Stop reason: HOSPADM

## 2023-06-24 RX ORDER — SODIUM CHLORIDE 9 MG/ML
INJECTION, SOLUTION INTRAVENOUS CONTINUOUS
Status: DISCONTINUED | OUTPATIENT
Start: 2023-06-24 | End: 2023-06-24

## 2023-06-24 RX ORDER — CLOPIDOGREL BISULFATE 75 MG/1
75 TABLET ORAL DAILY
Qty: 30 TABLET | Refills: 0 | Status: SHIPPED | OUTPATIENT
Start: 2023-06-24 | End: 2023-06-28 | Stop reason: SDUPTHER

## 2023-06-24 RX ADMIN — LEVOTHYROXINE SODIUM 112 MCG: 0.11 TABLET ORAL at 05:06

## 2023-06-24 RX ADMIN — SODIUM CHLORIDE 500 ML: 9 INJECTION, SOLUTION INTRAVENOUS at 12:06

## 2023-06-24 RX ADMIN — CEFTRIAXONE 2 G: 2 INJECTION, POWDER, FOR SOLUTION INTRAMUSCULAR; INTRAVENOUS at 01:06

## 2023-06-24 RX ADMIN — HEPARIN SODIUM 12 UNITS/KG/HR: 10000 INJECTION, SOLUTION INTRAVENOUS at 06:06

## 2023-06-24 RX ADMIN — PANTOPRAZOLE SODIUM 40 MG: 40 TABLET, DELAYED RELEASE ORAL at 08:06

## 2023-06-24 RX ADMIN — SODIUM CHLORIDE: 9 INJECTION, SOLUTION INTRAVENOUS at 05:06

## 2023-06-24 RX ADMIN — FERROUS SULFATE TAB 325 MG (65 MG ELEMENTAL FE) 1 EACH: 325 (65 FE) TAB at 09:06

## 2023-06-24 RX ADMIN — Medication 6 MG: at 08:06

## 2023-06-24 RX ADMIN — ASPIRIN 81 MG CHEWABLE TABLET 81 MG: 81 TABLET CHEWABLE at 12:06

## 2023-06-24 RX ADMIN — CLOPIDOGREL BISULFATE 75 MG: 75 TABLET ORAL at 12:06

## 2023-06-24 NOTE — ASSESSMENT & PLAN NOTE
-BC growing strep, final pending  -repeat BC today  -start Rocephin IV  -UA and CXR negative, afebrile, unclear etiology?

## 2023-06-24 NOTE — PLAN OF CARE
Problem: Fluid and Electrolyte Imbalance (Acute Kidney Injury/Impairment)  Goal: Fluid and Electrolyte Balance  Outcome: Ongoing, Progressing     Problem: Fall Injury Risk  Goal: Absence of Fall and Fall-Related Injury  Outcome: Ongoing, Progressing

## 2023-06-24 NOTE — ASSESSMENT & PLAN NOTE
CT abdomen pelvis without acute findings.  Asymptomatic at this time.  -analgesics prn  -continue home PPI/antiacids

## 2023-06-24 NOTE — ASSESSMENT & PLAN NOTE
Patient is chronically on statin.will continue for now. Last Lipid Panel:   Lab Results   Component Value Date    CHOL 157 06/05/2019    HDL 45 06/05/2019    LDLCALC 69.2 06/05/2019    TRIG 214 (H) 06/05/2019    CHOLHDL 28.7 06/05/2019

## 2023-06-24 NOTE — SUBJECTIVE & OBJECTIVE
Past Medical History:   Diagnosis Date    Allergy     Anxiety     Arthritis     Blood transfusion     Cataract     Depression     GERD (gastroesophageal reflux disease)     Heart murmur     Hypercholesterolemia     Primary hypertension 11/24/2021    Prostate cancer     Thyroid disease        Past Surgical History:   Procedure Laterality Date    ABDOMINAL HERNIA REPAIR Right     AORTIC VALVE REPLACEMENT      BASAL CELL CARCINOMA EXCISION Right 2022    CARDIAC VALVE SURGERY      PROSTATECTOMY         Review of patient's allergies indicates:   Allergen Reactions    Tomato (solanum lycopersicum)     Grape Rash    [place] Rash       No current facility-administered medications on file prior to encounter.     Current Outpatient Medications on File Prior to Encounter   Medication Sig    aspirin (ECOTRIN) 81 MG EC tablet Take 81 mg by mouth once daily.    cholecalciferol, vitamin D3, 1,000 unit capsule Take 1,000 Units by mouth 2 (two) times daily.    ferrous gluconate (FERGON) 324 MG tablet Take 324 mg by mouth daily with breakfast.    levothyroxine (SYNTHROID) 112 MCG tablet Take 1 tablet by mouth every morning.    MULTIVIT-IRON-MIN-FOLIC ACID 3,500-18-0.4 UNIT-MG-MG ORAL CHEW Take 1 tablet by mouth once daily.    rosuvastatin (CRESTOR) 20 MG tablet Take 20 mg by mouth every evening.    [DISCONTINUED] esomeprazole (NEXIUM) 40 MG capsule Take 40 mg by mouth before dinner.     [DISCONTINUED] losartan (COZAAR) 25 MG tablet Take 25 mg by mouth once daily.    fluticasone (FLONASE) 50 mcg/actuation nasal spray 1 spray by Each Nare route once daily.    hydrocortisone 2.5 % cream Apply topically 2 (two) times daily.    tramadol (ULTRAM) 50 mg tablet Take 50 mg by mouth every 6 (six) hours as needed.    [DISCONTINUED] hydroCHLOROthiazide (MICROZIDE) 12.5 mg capsule Take 12.5 mg by mouth once daily.     Family History       Problem Relation (Age of Onset)    Cataracts Father    Diabetes Brother          Tobacco Use    Smoking  status: Former    Smokeless tobacco: Never    Tobacco comments:     quit 40 years ago   Substance and Sexual Activity    Alcohol use: No    Drug use: No    Sexual activity: Not Currently     Review of Systems   Constitutional: Negative for malaise/fatigue.   HENT: Negative.     Eyes: Negative.    Cardiovascular:  Negative for chest pain (acid reflux type discomfort).   Respiratory: Negative.     Endocrine: Negative.    Hematologic/Lymphatic: Negative.    Skin: Negative.    Musculoskeletal: Negative.    Gastrointestinal:  Positive for abdominal pain.   Genitourinary: Negative.    Neurological: Negative.    Psychiatric/Behavioral:  Positive for memory loss.    Allergic/Immunologic: Negative.    Objective:     Vital Signs (Most Recent):  Temp: 97.4 °F (36.3 °C) (06/24/23 1233)  Pulse: 66 (06/24/23 1233)  Resp: 18 (06/24/23 1233)  BP: 122/67 (06/24/23 1233)  SpO2: 99 % (06/24/23 1233) Vital Signs (24h Range):  Temp:  [97.4 °F (36.3 °C)-99.5 °F (37.5 °C)] 97.4 °F (36.3 °C)  Pulse:  [66-99] 66  Resp:  [16-18] 18  SpO2:  [93 %-100 %] 99 %  BP: ()/(44-76) 122/67     Weight: 62.4 kg (137 lb 9.1 oz)  Body mass index is 19.74 kg/m².    SpO2: 99 %         Intake/Output Summary (Last 24 hours) at 6/24/2023 1440  Last data filed at 6/24/2023 0502  Gross per 24 hour   Intake 367.91 ml   Output --   Net 367.91 ml       Lines/Drains/Airways       Airway  Duration                  Airway - Non-Surgical 07/15/14 1102 Nasal Cannula 3266 days              Peripheral Intravenous Line  Duration                  Peripheral IV - Single Lumen 06/22/23 2208 20 G Anterior;Right Forearm 1 day         Peripheral IV - Single Lumen 06/22/23 2301 18 G Anterior;Left Forearm 1 day                     Physical Exam  Vitals and nursing note reviewed.   Constitutional:       General: He is not in acute distress.     Appearance: Normal appearance. He is well-developed. He is not diaphoretic.   HENT:      Head: Normocephalic and atraumatic.   Eyes:       General:         Right eye: No discharge.         Left eye: No discharge.      Pupils: Pupils are equal, round, and reactive to light.   Neck:      Thyroid: No thyromegaly.      Vascular: No JVD.      Trachea: No tracheal deviation.   Cardiovascular:      Rate and Rhythm: Normal rate and regular rhythm.      Heart sounds: Normal heart sounds, S1 normal and S2 normal. No murmur heard.  Pulmonary:      Effort: Pulmonary effort is normal. No respiratory distress.      Breath sounds: Normal breath sounds. No wheezing or rales.   Abdominal:      General: There is no distension.      Tenderness: There is no abdominal tenderness. There is no rebound.   Musculoskeletal:      Cervical back: Neck supple.      Right lower leg: No edema.      Left lower leg: No edema.   Skin:     General: Skin is warm and dry.      Findings: No erythema.   Neurological:      General: No focal deficit present.      Mental Status: He is alert and oriented to person, place, and time.   Psychiatric:         Mood and Affect: Mood normal.         Behavior: Behavior normal.         Thought Content: Thought content normal.        Significant Labs: CMP   Recent Labs   Lab 06/22/23  2300 06/23/23  0632 06/24/23  0806    136 136   K 3.9 4.2 3.9    104 103   CO2 26 23 23   * 118* 107   BUN 30* 32* 39*   CREATININE 2.0* 1.9* 1.9*   CALCIUM 9.4 9.2 8.7   PROT 7.4  --   --    ALBUMIN 3.6  --   --    BILITOT 0.5  --   --    ALKPHOS 65  --   --    AST 18  --   --    ALT 11  --   --    ANIONGAP 11 9 10     , CBC   Recent Labs   Lab 06/22/23  2300 06/24/23  0504   WBC 12.01 16.07*   HGB 9.5* 8.2*   HCT 28.5* 24.5*    118*     , Troponin   Recent Labs   Lab 06/23/23  0632 06/23/23  0955 06/23/23  2048   TROPONINI 3.887* 5.956* 5.020*     , and All pertinent lab results from the last 24 hours have been reviewed.    Significant Imaging: Echocardiogram: Transthoracic echo (TTE) complete (Cupid Only):   Results for orders placed or  performed during the hospital encounter of 06/22/23   Echo   Result Value Ref Range    BSA 1.86 m2    TDI SEPTAL 0.07 m/s    LV LATERAL E/E' RATIO 13.80 m/s    LV SEPTAL E/E' RATIO 19.71 m/s    LA WIDTH 3.80 cm    IVC diameter 2.16 cm    Left Ventricular Outflow Tract Mean Velocity 1.40 cm/s    Left Ventricular Outflow Tract Mean Gradient 9.33 mmHg    TV mean gradient 21 mmHg    TDI LATERAL 0.10 m/s    LVIDd 4.53 3.5 - 6.0 cm    IVS 1.49 (A) 0.6 - 1.1 cm    Posterior Wall 1.46 (A) 0.6 - 1.1 cm    Ao root annulus 2.24 cm    LVIDs 2.80 2.1 - 4.0 cm    FS 38 28 - 44 %    LA volume 66.25 cm3    STJ 2.37 cm    Ascending aorta 2.41 cm    LV mass 271.46 g    LA size 4.32 cm    TAPSE 1.80 cm    Left Ventricle Relative Wall Thickness 0.64 cm    AV mean gradient 24 mmHg    AV valve area 1.76 cm2    AV Velocity Ratio 0.63     AV index (prosthetic) 0.64     MV mean gradient 4 mmHg    MV valve area p 1/2 method 2.58 cm2    MV valve area by continuity eq 2.84 cm2    E/A ratio 1.07     Mean e' 0.09 m/s    E wave deceleration time 293.64 msec    IVRT 60.89 msec    LVOT diameter 1.88 cm    LVOT area 2.8 cm2    LVOT peak hira 1.87 m/s    LVOT peak VTI 45.90 cm    Ao peak hira 2.95 m/s    Ao VTI 72.2 cm    RVOT peak hira 0.84 m/s    RVOT peak VTI 17.1 cm    Mr max hira 5.06 m/s    LVOT stroke volume 127.35 cm3    AV peak gradient 35 mmHg    MV peak gradient 9 mmHg    PV mean gradient 1.71 mmHg    E/E' ratio 16.24 m/s    MV Peak E Hira 1.38 m/s    TR Max Hira 2.87 m/s    MV VTI 44.8 cm    MV stenosis pressure 1/2 time 85.16 ms    MV Peak A Hira 1.29 m/s    LV Systolic Volume 29.50 mL    LV Systolic Volume Index 15.8 mL/m2    LV Diastolic Volume 94.13 mL    LV Diastolic Volume Index 50.34 mL/m2    LA Volume Index 35.4 mL/m2    LV Mass Index 145 g/m2    RA Major Axis 3.68 cm    Left Atrium Minor Axis 4.85 cm    Left Atrium Major Axis 4.65 cm    Triscuspid Valve Regurgitation Peak Gradient 33 mmHg    RA Width 2.20 cm    Right Atrial Pressure  (from IVC) 8 mmHg    EF 60 %    TV rest pulmonary artery pressure 41 mmHg    Narrative    · The left ventricle is normal in size with severe concentric hypertrophy   and normal systolic function.  · Mild left atrial enlargement.  · Grade II left ventricular diastolic dysfunction.  · The estimated PA systolic pressure is 41 mmHg.  · Normal right ventricular size with normal right ventricular systolic   function.  · Intermediate central venous pressure (8 mmHg).  · The estimated ejection fraction is 60%.  · There is a bioprosthetic aortic valve present. There is no aortic   insufficiency present. Prosthetic aortic valve is normal.  · The aortic valve mean gradient is 24 mmHg with a dimensionless index of   0.64.  · Mild mitral regurgitation.  · Mild tricuspid regurgitation.  · There is pulmonary hypertension.      , EKG: Reviewed, and X-Ray: CXR: X-Ray Chest 1 View (CXR): No results found for this visit on 06/22/23. and X-Ray Chest PA and Lateral (CXR): No results found for this visit on 06/22/23.

## 2023-06-24 NOTE — HOSPITAL COURSE
89 y/o male admitted with abdominal pain, LUQ and radiates across the top, exacerbated after he eats, with no improvement after taking rosa-seltzer. Troponin was bumped on admission and continued to trend up. Started on heparin gtt nomogram. Cr 1.9 (near his baseline of 1.6-1.9). Cardiology consulted for assistance. BC obtained on admission, growing strep, started on Rocephin IV (6/24), repeat BC 6/25, pending.  Echo EF 60%, grade II LV diastolic dysfunction, mild MR, TR with pulmonary hypertension. EKG S tachy.  6/26: cards on case. STARLA tomorrow. Repeat blood cultures NG. Cont rocephin.   6/27: STARLA positive for small highly mobile vegetation attached at the root of anterior mitral valve leaflet close to the aortic root. Repeat blood cultures negative. Increase rocephin to 2g q12h. Will discuss with ID on addition of gentamicin x 2 weeks. Picc line ordered. Will need 6 weeks IV abx therapy.   6/28: Patient stable for discharge on 6 weeks of Rocephin IV daily, No Gentamicin due to renal function per ID.   and infusion company set up.  Case dw Dr. Izquierdo.  Plan for DC with follow up with ID, cardiology and PCP.  POC dw patient and daughter at bedside.

## 2023-06-24 NOTE — HOSPITAL COURSE
"Mr. Castellanos is a 89 year old male patient whose current medical conditions include AS s/p AVR, prostate cancer, thyroid disease, HTN, hyperlipidemia, and CAD who presented to [facility] ED thsi AM with a chief complaint of left-sided/generalized abdominal discomfort that onset yesterday evening. Associated symptoms included indigestion like discomfort and decreased po intake. Patient denied any associated palpitations, LH, dizziness, fever, chills, nausea, vomiting, or diaphoresis. Took Fanny-Harrison to try to relieve symptoms. Initial workup in ED revealed MAGO (creatinine of 2.0), troponin of 0.623, and BNP of 254 and patient was subsequently admitted for further evaluation and treatment. Cardiology consulted to assist with management. Patient seen and examined today, resting in bed with family in room. Feels ok. Denies any overt chest pain or tightness but family does report he has been complaining of "acid reflux" symptoms over the past few days. Patient is compliant with his medications. Followed on OP basis by Dr. Bueno. Troponin 0.623>1.671>3.887. H/H 9.5/28.5, recent FOBT negative. Echo pending. EKG reviewed, no acute ischemic changes appreciated. CT of abdomen with NAF.       6.24.2023  Trop trended down   Discussed with family ,   Bacteremic   Chest pain free , dementia   Daughter and family wants conservative management     6/25/2023  Looks ill   Daughter and son in law in room   Denies chest pain   Updated about bactermia , possible need for yandel, family is agreeable if needed, patient looking very fatigued     6/26/23-Patient seen and examined today with daughter at bedside. Feels ok. Tired/weak. BP borderline. Repeat BC pending. Creatinine 1.6. H/H 7.9/23.5. YANDEL requested by ID.    6/27/23-Patient seen and examined today. YANDEL this AM showed highly mobile small vegetation attached on the root of anterior mitral valve leaflet, next to aortic root as well as some aortic root thickening. Feels ok. Still weak. " Does admit to some GERD symptoms after eating. No SOB. Labs reviewed. Creatinine 1.5.

## 2023-06-24 NOTE — DISCHARGE INSTRUCTIONS
Stop taking HCTZ and losartan with hypotension and MAGO until follow up with PCP and get further direction.        POST STARLA DISCHARGE INSTRUCTIONS:  ACTIVITY/SAFETY  Because of the aftereffect of the sedation you received, we advise you to refrain from the following activities for 24 hours.  1. Do not drive or operate machinery.  2. Do not operate appliances if alone. (stove, iron, lawnmower)  3. Do not sign legal papers or make important decisions.    Have standby assistance on stairways.  It is advised that you have someone stay with you for at least 8 hours after procedure    Comfort:  If you develop a sore throat gargle with warm salt water (1/2 tsp.of salt in 8oz of warm water) or use throat lozenges.     Some of the sedatives you received today may make you nauseated.  Begin with clear liquids and then progress to solid foods as tolerated.    Avoid eating for 1 hour after procedure due to numbing of throat before procedure.    CALL THE DOCTOR FOR:  SEVERE THROAT PAIN / DIFFICULTY SWALLOWING                                                  SEVERE ABDOMINAL OR CHEST PAIN                                                  VOMITING BLOOD.

## 2023-06-24 NOTE — SUBJECTIVE & OBJECTIVE
Interval History: awake, alert, sitting up in bed, still c/o LUQ pain, especially after he eats. He has been having hip pain from lying in bed and getting restless. BC growing strep, will start Rocephin. Repeat BC today.     Review of Systems  Objective:     Vital Signs (Most Recent):  Temp: 97.4 °F (36.3 °C) (06/24/23 1233)  Pulse: 66 (06/24/23 1233)  Resp: 18 (06/24/23 1233)  BP: 122/67 (06/24/23 1233)  SpO2: 99 % (06/24/23 1233) Vital Signs (24h Range):  Temp:  [97.4 °F (36.3 °C)-99.5 °F (37.5 °C)] 97.4 °F (36.3 °C)  Pulse:  [66-99] 66  Resp:  [16-18] 18  SpO2:  [93 %-100 %] 99 %  BP: ()/(44-76) 122/67     Weight: 62.4 kg (137 lb 9.1 oz)  Body mass index is 19.74 kg/m².    Intake/Output Summary (Last 24 hours) at 6/24/2023 1526  Last data filed at 6/24/2023 0502  Gross per 24 hour   Intake 367.91 ml   Output --   Net 367.91 ml         Physical Exam  Vitals and nursing note reviewed.   Constitutional:       Appearance: Normal appearance.   Cardiovascular:      Rate and Rhythm: Normal rate and regular rhythm.      Heart sounds: Murmur heard.   Pulmonary:      Effort: Pulmonary effort is normal.      Breath sounds: Normal breath sounds.   Abdominal:      General: Bowel sounds are normal.      Palpations: Abdomen is soft.      Tenderness: There is abdominal tenderness.   Musculoskeletal:         General: Normal range of motion.   Skin:     General: Skin is warm and dry.   Neurological:      General: No focal deficit present.      Mental Status: He is alert and oriented to person, place, and time.   Psychiatric:         Mood and Affect: Mood normal.         Behavior: Behavior normal.           Significant Labs: All pertinent labs within the past 24 hours have been reviewed.  Blood Culture:   Recent Labs   Lab 06/22/23  2250 06/22/23  2301   LABBLOO Gram stain aer bottle: Gram positive cocci in chains resembling Strep  Gram stain kaila bottle: Gram positive cocci in chains resembling Strep  Results called to and  read back by: Elvira Alicea  06/24/2023  13:10 Gram stain kaila bottle: Gram positive cocci in chains resembling Strep  Results called to and read back by: Elvira Alicea  06/24/2023  13:10     CBC:   Recent Labs   Lab 06/22/23  2300 06/24/23  0504   WBC 12.01 16.07*   HGB 9.5* 8.2*   HCT 28.5* 24.5*    118*     CMP:   Recent Labs   Lab 06/22/23  2300 06/23/23  0632 06/24/23  0806    136 136   K 3.9 4.2 3.9    104 103   CO2 26 23 23   * 118* 107   BUN 30* 32* 39*   CREATININE 2.0* 1.9* 1.9*   CALCIUM 9.4 9.2 8.7   PROT 7.4  --   --    ALBUMIN 3.6  --   --    BILITOT 0.5  --   --    ALKPHOS 65  --   --    AST 18  --   --    ALT 11  --   --    ANIONGAP 11 9 10     Troponin:   Recent Labs   Lab 06/23/23  0632 06/23/23  0955 06/23/23  2048   TROPONINI 3.887* 5.956* 5.020*       Significant Imaging: I have reviewed all pertinent imaging results/findings within the past 24 hours.

## 2023-06-24 NOTE — ASSESSMENT & PLAN NOTE
-Presents with acid reflux type symptoms over the past few days  -Troponin 0.623>1.671>3.887, repeat pending  -Echo pending  -Continue ASA, statin, heparin gtt  -BP soft, will maximize regimen as tolerated  -Discussed invasive mgmt with [facility] once renal function stabilizes/improves, family/pt unsure about proceeding at present time. Continue OMT in interim.      6.24.2023  Asa, plavix and ppi on discharge  See HPI   Conservative management   Treated for bacteremia by hospital medicine , hx of valve, may need STARLA , will await for final cultures , discussed with HM

## 2023-06-24 NOTE — ASSESSMENT & PLAN NOTE
Chronic, controlled  -Latest blood pressure and vitals reviewed-   Temp:  [97.4 °F (36.3 °C)-99.5 °F (37.5 °C)]   Pulse:  [66-99]   Resp:  [16-18]   BP: ()/(44-76)   SpO2:  [93 %-100 %] .   -Home meds for hypertension were reviewed and noted below.   Hypertension Medications         -While in the hospital, will manage blood pressure as follows; Adjust home antihypertensive regimen as follows- hold HCTZ and losartan d/t hypotension  -PRN anti-hypertensive medication if patient's BP > 160/100   -optimize pain management

## 2023-06-24 NOTE — ASSESSMENT & PLAN NOTE
Patient has chronic hypothyroidism. TFTs reviewed-   Lab Results   Component Value Date    TSH 4.0 10/11/2007   -Will continue chronic levothyroxine and adjust for and clinical changes

## 2023-06-24 NOTE — ASSESSMENT & PLAN NOTE
Patient with acute kidney injury likely due to IVVD/dehydration and pre-renal azotemia MAGO is currently being treated. Labs reviewed- Renal function/electrolytes with Estimated Creatinine Clearance: 22.8 mL/min (A) (based on SCr of 1.9 mg/dL (H)). according to latest data. Monitor urine output and serial BMP and adjust therapy as needed. Avoid nephrotoxins and renally dose meds for GFR listed above.

## 2023-06-24 NOTE — PLAN OF CARE
A238/A238 GREGORIO Martinez BHUPINDER Castellanos is a 89 y.o.male admitted on 6/22/2023 for Epigastric pain   Code Status: DNR MRN: [id number]   Review of patient's allergies indicates:   Allergen Reactions    Tomato (solanum lycopersicum)     Grape Rash    [place] Rash     Past Medical History:   Diagnosis Date    Allergy     Anxiety     Arthritis     Blood transfusion     Cataract     Depression     GERD (gastroesophageal reflux disease)     Heart murmur     Hypercholesterolemia     Primary hypertension 11/24/2021    Prostate cancer     Thyroid disease       PRN meds    acetaminophen, 650 mg, Q6H PRN  aluminum-magnesium hydroxide-simethicone, 30 mL, QID PRN  dextrose 10%, 12.5 g, PRN  dextrose 10%, 25 g, PRN  dextrose, 15 g, PRN  dextrose, 30 g, PRN  glucagon (human recombinant), 1 mg, PRN  heparin (PORCINE), 30 Units/kg, PRN  heparin (PORCINE), 57.2 Units/kg, PRN  HYDROcodone-acetaminophen, 1 tablet, Q6H PRN  melatonin, 6 mg, Nightly PRN  morphine, 2 mg, Q4H PRN  naloxone, 0.02 mg, PRN  ondansetron, 4 mg, Q8H PRN  polyethylene glycol, 17 g, Daily PRN  promethazine, 25 mg, Q6H PRN  simethicone, 1 tablet, QID PRN  sodium chloride 0.9%, 3 mL, Q12H PRN      New admit. Heparin drip at 12 kg. NS at 75 ml/hr. Pt on cardiac monitor. Chart check completed. Will continue plan of care.      Orientation: oriented x 4  [place] Coma Scale Score: 15     Lead Monitored: Lead II Rhythm: normal sinus rhythm    Cardiac/Telemetry Box Number: [id number]  VTE Required Core Measure: Pharmacological prophylaxis initiated/maintained Last Bowel Movement: 06/21/23  Diet Cardiac  Voiding Characteristics: voids spontaneously without difficulty  Suleman Score: 19  Fall Risk Score: 9  Accucheck []   Freq?      Lines/Drains/Airways       Airway  Duration                  Airway - Non-Surgical 07/15/14 1102 Nasal Cannula 3265 days              Peripheral Intravenous Line  Duration                  Peripheral IV - Single Lumen 06/22/23 2208 20 G Anterior;Right Forearm <1 day          Peripheral IV - Single Lumen 06/22/23 2301 18 G Anterior;Left Forearm <1 day

## 2023-06-24 NOTE — PROGRESS NOTES
"[facility] - Tele[facility] ([facility])  Cardiology  Progress Note    Patient Name: Juan Castellanos  MRN: [id number]  Admission Date: 6/22/2023  Hospital Length of Stay: 0 days  Code Status: DNR   Attending Physician: Luba Solano MD   Primary Care Physician: Devyn Beck MD  Expected Discharge Date: 6/24/2023  Principal Problem:Epigastric pain    Subjective:     Hospital Course:   Mr. Castellanos is a 89 year old male patient whose current medical conditions include AS s/p AVR, prostate cancer, thyroid disease, HTN, hyperlipidemia, and CAD who presented to [facility] ED thsi AM with a chief complaint of left-sided/generalized abdominal discomfort that onset yesterday evening. Associated symptoms included indigestion like discomfort and decreased po intake. Patient denied any associated palpitations, LH, dizziness, fever, chills, nausea, vomiting, or diaphoresis. Took [place] to try to relieve symptoms. Initial workup in ED revealed MAGO (creatinine of 2.0), troponin of 0.623, and BNP of 254 and patient was subsequently admitted for further evaluation and treatment. Cardiology consulted to assist with management. Patient seen and examined today, resting in bed with family in room. Feels ok. Denies any overt chest pain or tightness but family does report he has been complaining of "acid reflux" symptoms over the past few days. Patient is compliant with his medications. Followed on OP basis by Dr. Bueno. Troponin 0.623>1.671>3.887. H/H 9.5/28.5, recent FOBT negative. Echo pending. EKG reviewed, no acute ischemic changes appreciated. CT of abdomen with NAF.       6.24.2023  Trop trended down   Discussed with family ,   Bacteremic   Chest pain free , dementia   Daughter and family wants conservative management       Past Medical History:   Diagnosis Date    Allergy     Anxiety     Arthritis     Blood transfusion     Cataract     Depression     GERD (gastroesophageal reflux disease)     Heart murmur     " Hypercholesterolemia     Primary hypertension 11/24/2021    Prostate cancer     Thyroid disease        Past Surgical History:   Procedure Laterality Date    ABDOMINAL HERNIA REPAIR Right     AORTIC VALVE REPLACEMENT      BASAL CELL CARCINOMA EXCISION Right 2022    CARDIAC VALVE SURGERY      PROSTATECTOMY         Review of patient's allergies indicates:   Allergen Reactions    Tomato (solanum lycopersicum)     Grape Rash    [place] Rash       No current facility-administered medications on file prior to encounter.     Current Outpatient Medications on File Prior to Encounter   Medication Sig    aspirin (ECOTRIN) 81 MG EC tablet Take 81 mg by mouth once daily.    cholecalciferol, vitamin D3, 1,000 unit capsule Take 1,000 Units by mouth 2 (two) times daily.    ferrous gluconate (FERGON) 324 MG tablet Take 324 mg by mouth daily with breakfast.    levothyroxine (SYNTHROID) 112 MCG tablet Take 1 tablet by mouth every morning.    MULTIVIT-IRON-MIN-FOLIC ACID 3,500-18-0.4 UNIT-MG-MG ORAL CHEW Take 1 tablet by mouth once daily.    rosuvastatin (CRESTOR) 20 MG tablet Take 20 mg by mouth every evening.    [DISCONTINUED] esomeprazole (NEXIUM) 40 MG capsule Take 40 mg by mouth before dinner.     [DISCONTINUED] losartan (COZAAR) 25 MG tablet Take 25 mg by mouth once daily.    fluticasone (FLONASE) 50 mcg/actuation nasal spray 1 spray by Each Nare route once daily.    hydrocortisone 2.5 % cream Apply topically 2 (two) times daily.    tramadol (ULTRAM) 50 mg tablet Take 50 mg by mouth every 6 (six) hours as needed.    [DISCONTINUED] hydroCHLOROthiazide (MICROZIDE) 12.5 mg capsule Take 12.5 mg by mouth once daily.     Family History       Problem Relation (Age of Onset)    Cataracts Father    Diabetes Brother          Tobacco Use    Smoking status: Former    Smokeless tobacco: Never    Tobacco comments:     quit 40 years ago   Substance and Sexual Activity    Alcohol use: No    Drug use: No    Sexual  activity: Not Currently     Review of Systems   Constitutional: Negative for malaise/fatigue.   HENT: Negative.     Eyes: Negative.    Cardiovascular:  Negative for chest pain (acid reflux type discomfort).   Respiratory: Negative.     Endocrine: Negative.    Hematologic/Lymphatic: Negative.    Skin: Negative.    Musculoskeletal: Negative.    Gastrointestinal:  Positive for abdominal pain.   Genitourinary: Negative.    Neurological: Negative.    Psychiatric/Behavioral:  Positive for memory loss.    Allergic/Immunologic: Negative.    Objective:     Vital Signs (Most Recent):  Temp: 97.4 °F (36.3 °C) (06/24/23 1233)  Pulse: 66 (06/24/23 1233)  Resp: 18 (06/24/23 1233)  BP: 122/67 (06/24/23 1233)  SpO2: 99 % (06/24/23 1233) Vital Signs (24h Range):  Temp:  [97.4 °F (36.3 °C)-99.5 °F (37.5 °C)] 97.4 °F (36.3 °C)  Pulse:  [66-99] 66  Resp:  [16-18] 18  SpO2:  [93 %-100 %] 99 %  BP: ()/(44-76) 122/67     Weight: 62.4 kg (137 lb 9.1 oz)  Body mass index is 19.74 kg/m².    SpO2: 99 %         Intake/Output Summary (Last 24 hours) at 6/24/2023 1440  Last data filed at 6/24/2023 0502  Gross per 24 hour   Intake 367.91 ml   Output --   Net 367.91 ml       Lines/Drains/Airways       Airway  Duration                  Airway - Non-Surgical 07/15/14 1102 Nasal Cannula 3266 days              Peripheral Intravenous Line  Duration                  Peripheral IV - Single Lumen 06/22/23 2208 20 G Anterior;Right Forearm 1 day         Peripheral IV - Single Lumen 06/22/23 2301 18 G Anterior;Left Forearm 1 day                     Physical Exam  Vitals and nursing note reviewed.   Constitutional:       General: He is not in acute distress.     Appearance: Normal appearance. He is well-developed. He is not diaphoretic.   HENT:      Head: Normocephalic and atraumatic.   Eyes:      General:         Right eye: No discharge.         Left eye: No discharge.      Pupils: Pupils are equal, round, and reactive to light.   Neck:      Thyroid:  No thyromegaly.      Vascular: No JVD.      Trachea: No tracheal deviation.   Cardiovascular:      Rate and Rhythm: Normal rate and regular rhythm.      Heart sounds: Normal heart sounds, S1 normal and S2 normal. No murmur heard.  Pulmonary:      Effort: Pulmonary effort is normal. No respiratory distress.      Breath sounds: Normal breath sounds. No wheezing or rales.   Abdominal:      General: There is no distension.      Tenderness: There is no abdominal tenderness. There is no rebound.   Musculoskeletal:      Cervical back: Neck supple.      Right lower leg: No edema.      Left lower leg: No edema.   Skin:     General: Skin is warm and dry.      Findings: No erythema.   Neurological:      General: No focal deficit present.      Mental Status: He is alert and oriented to person, place, and time.   Psychiatric:         Mood and Affect: Mood normal.         Behavior: Behavior normal.         Thought Content: Thought content normal.        Significant Labs: CMP   Recent Labs   Lab 06/22/23  2300 06/23/23  0632 06/24/23  0806    136 136   K 3.9 4.2 3.9    104 103   CO2 26 23 23   * 118* 107   BUN 30* 32* 39*   CREATININE 2.0* 1.9* 1.9*   CALCIUM 9.4 9.2 8.7   PROT 7.4  --   --    ALBUMIN 3.6  --   --    BILITOT 0.5  --   --    ALKPHOS 65  --   --    AST 18  --   --    ALT 11  --   --    ANIONGAP 11 9 10     , CBC   Recent Labs   Lab 06/22/23  2300 06/24/23  0504   WBC 12.01 16.07*   HGB 9.5* 8.2*   HCT 28.5* 24.5*    118*     , Troponin   Recent Labs   Lab 06/23/23  0632 06/23/23  0955 06/23/23  2048   TROPONINI 3.887* 5.956* 5.020*     , and All pertinent lab results from the last 24 hours have been reviewed.    Significant Imaging: Echocardiogram: Transthoracic echo (TTE) complete ([facility] Only):   Results for orders placed or performed during the hospital encounter of 06/22/23   Echo   Result Value Ref Range    BSA 1.86 m2    TDI SEPTAL 0.07 m/s    LV LATERAL E/E' RATIO 13.80 m/s    LV  SEPTAL E/E' RATIO 19.71 m/s    LA WIDTH 3.80 cm    IVC diameter 2.16 cm    Left Ventricular Outflow Tract Mean Velocity 1.40 cm/s    Left Ventricular Outflow Tract Mean Gradient 9.33 mmHg    TV mean gradient 21 mmHg    TDI LATERAL 0.10 m/s    LVIDd 4.53 3.5 - 6.0 cm    IVS 1.49 (A) 0.6 - 1.1 cm    Posterior Wall 1.46 (A) 0.6 - 1.1 cm    Ao root annulus 2.24 cm    LVIDs 2.80 2.1 - 4.0 cm    FS 38 28 - 44 %    LA volume 66.25 cm3    STJ 2.37 cm    Ascending aorta 2.41 cm    LV mass 271.46 g    LA size 4.32 cm    TAPSE 1.80 cm    Left Ventricle Relative Wall Thickness 0.64 cm    AV mean gradient 24 mmHg    AV valve area 1.76 cm2    AV Velocity Ratio 0.63     AV index (prosthetic) 0.64     MV mean gradient 4 mmHg    MV valve area p 1/2 method 2.58 cm2    MV valve area by continuity eq 2.84 cm2    E/A ratio 1.07     Mean e' 0.09 m/s    E wave deceleration time 293.64 msec    IVRT 60.89 msec    LVOT diameter 1.88 cm    LVOT area 2.8 cm2    LVOT peak hira 1.87 m/s    LVOT peak VTI 45.90 cm    Ao peak hira 2.95 m/s    Ao VTI 72.2 cm    RVOT peak hira 0.84 m/s    RVOT peak VTI 17.1 cm    Mr max hira 5.06 m/s    LVOT stroke volume 127.35 cm3    AV peak gradient 35 mmHg    MV peak gradient 9 mmHg    PV mean gradient 1.71 mmHg    E/E' ratio 16.24 m/s    MV Peak E Hira 1.38 m/s    TR Max Hira 2.87 m/s    MV VTI 44.8 cm    MV stenosis pressure 1/2 time 85.16 ms    MV Peak A Hira 1.29 m/s    LV Systolic Volume 29.50 mL    LV Systolic Volume Index 15.8 mL/m2    LV Diastolic Volume 94.13 mL    LV Diastolic Volume Index 50.34 mL/m2    LA Volume Index 35.4 mL/m2    LV Mass Index 145 g/m2    RA Major Axis 3.68 cm    Left Atrium Minor Axis 4.85 cm    Left Atrium Major Axis 4.65 cm    Triscuspid Valve Regurgitation Peak Gradient 33 mmHg    RA Width 2.20 cm    Right Atrial Pressure (from IVC) 8 mmHg    EF 60 %    TV rest pulmonary artery pressure 41 mmHg    Narrative    · The left ventricle is normal in size with severe concentric hypertrophy    and normal systolic function.  · Mild left atrial enlargement.  · Grade II left ventricular diastolic dysfunction.  · The estimated PA systolic pressure is 41 mmHg.  · Normal right ventricular size with normal right ventricular systolic   function.  · Intermediate central venous pressure (8 mmHg).  · The estimated ejection fraction is 60%.  · There is a bioprosthetic aortic valve present. There is no aortic   insufficiency present. Prosthetic aortic valve is normal.  · The aortic valve mean gradient is 24 mmHg with a dimensionless index of   0.64.  · Mild mitral regurgitation.  · Mild tricuspid regurgitation.  · There is pulmonary hypertension.      , EKG: Reviewed, and X-Ray: CXR: X-Ray Chest 1 View (CXR): No results found for this visit on 06/22/23. and X-Ray Chest PA and Lateral (CXR): No results found for this visit on 06/22/23.    Assessment and Plan:         * Epigastric pain  -Unclear etiology  -CT of abdomen un-revealing  -Lipase normal  -Mgmt as per primary team    NSTEMI (non-ST elevated myocardial infarction)  -Presents with acid reflux type symptoms over the past few days  -Troponin 0.623>1.671>3.887, repeat pending  -Echo pending  -Continue ASA, statin, heparin gtt  -BP soft, will maximize regimen as tolerated  -Discussed invasive mgmt with [facility] once renal function stabilizes/improves, family/pt unsure about proceeding at present time. Continue OMT in interim.      6.24.2023  Asa, plavix and ppi on discharge  See HPI   Conservative management   Treated for bacteremia by hospital medicine , hx of valve, may need STARLA , will await for final cultures , discussed with      MAGO (acute kidney injury)  -Likely in setting of IVVD/decreased appetite/on HCTZ as OP  -Recommend gentle IV hydration    Elevated troponin  -See plan under NSTEMI    Primary hypertension  -BP soft  -Hold ARB/HCTZ given MAGO    Hyperlipidemia  -Statin    S/P AVR (aortic valve replacement)  -Reassess by echo        VTE Risk Mitigation  (From admission, onward)         Ordered     IP VTE HIGH RISK PATIENT  Once         06/23/23 0334     Place sequential compression device  Until discontinued         06/23/23 0334                Jonatan Valencia MD  Cardiology  [facility] - Tele[facility] ([facility])

## 2023-06-24 NOTE — PROGRESS NOTES
[facility] Medicine  Progress Note    Patient Name: Juan Castellanos  MRN: [id number]  Patient Class: OP- Observation   Admission Date: 6/22/2023  Length of Stay: 0 days  Attending Physician: Luba Solano MD  Primary Care Provider: Devyn Beck MD        Subjective:     Principal Problem:Epigastric pain      HPI:  Juan Castellanos is a 89 y.o. male with a PMH  has a past medical history of Allergy, Anxiety, Arthritis, Blood transfusion, Cataract, Depression, GERD (gastroesophageal reflux disease), Heart murmur, Hypercholesterolemia, Primary hypertension (11/24/2021), Prostate cancer, and Thyroid disease.  Presented to the ER accompanied by family for evaluation of epigastric/right upper quadrant abdominal pain that radiated to his right lower quadrant/umbilical region.  Patient reports he was sitting down watching that she baseball game and reports that the pain started all of a sudden.  Patient believed it was his acid reflux symptoms.  Patient states he took Rosa-[place] without any relief of his symptoms.  Only reported cardiac history was aortic valve replacement.  Denies any stents or history of mi/bypass.  Currently asymptomatic and denies any other associated symptoms accompanied with abdominal pain.  ER workup showed BUN/creatinine of 30/2.0 (previously 16/1.3), , troponin of 0.623, and EKG showed sinus tach with rate of 102 QT/QTC of 350/456.  Remainder of workup was unremarkable.  Patient received total of 40 Protonix and GI cocktail in ED.  Hospital Medicine consulted to admit patient for cardiac workup.  Patient family are in agreement with treatment plan.  Patient will be admitted under observation status.  PCP: Devyn Beck        Overview/Hospital Course:  89 y/o male admitted with abdominal pain, LUQ and radiates across the top, exacerbated after he eats, with no improvement after taking rosa-seltzer. Troponin was bumped on admission and continued to  trend up. Started on heparin gtt nomogram. Cr 1.9 (near his baseline of 1.6-1.9). Cardiology consulted for assistance. BC obtained on admission, growing strep, started on Rocephin IV (6/24), repeat BC pending.  Echo EF 60%, grade II LV diastolic dysfunction, mild MR, TR with pulmonary hypertension. EKG S tachy.      Interval History: awake, alert, sitting up in bed, still c/o LUQ pain, especially after he eats. He has been having hip pain from lying in bed and getting restless. BC growing strep, will start Rocephin. Repeat BC today.     Review of Systems  Objective:     Vital Signs (Most Recent):  Temp: 97.4 °F (36.3 °C) (06/24/23 1233)  Pulse: 66 (06/24/23 1233)  Resp: 18 (06/24/23 1233)  BP: 122/67 (06/24/23 1233)  SpO2: 99 % (06/24/23 1233) Vital Signs (24h Range):  Temp:  [97.4 °F (36.3 °C)-99.5 °F (37.5 °C)] 97.4 °F (36.3 °C)  Pulse:  [66-99] 66  Resp:  [16-18] 18  SpO2:  [93 %-100 %] 99 %  BP: ()/(44-76) 122/67     Weight: 62.4 kg (137 lb 9.1 oz)  Body mass index is 19.74 kg/m².    Intake/Output Summary (Last 24 hours) at 6/24/2023 1526  Last data filed at 6/24/2023 0502  Gross per 24 hour   Intake 367.91 ml   Output --   Net 367.91 ml         Physical Exam  Vitals and nursing note reviewed.   Constitutional:       Appearance: Normal appearance.   Cardiovascular:      Rate and Rhythm: Normal rate and regular rhythm.      Heart sounds: Murmur heard.   Pulmonary:      Effort: Pulmonary effort is normal.      Breath sounds: Normal breath sounds.   Abdominal:      General: Bowel sounds are normal.      Palpations: Abdomen is soft.      Tenderness: There is abdominal tenderness.   Musculoskeletal:         General: Normal range of motion.   Skin:     General: Skin is warm and dry.   Neurological:      General: No focal deficit present.      Mental Status: He is alert and oriented to person, place, and time.   Psychiatric:         Mood and Affect: Mood normal.         Behavior: Behavior normal.            Significant Labs: All pertinent labs within the past 24 hours have been reviewed.  Blood Culture:   Recent Labs   Lab 06/22/23  2250 06/22/23  2301   LABBLOO Gram stain aer bottle: Gram positive cocci in chains resembling Strep  Gram stain kaila bottle: Gram positive cocci in chains resembling Strep  Results called to and read back by: Elvira Alicea  06/24/2023  13:10 Gram stain kaila bottle: Gram positive cocci in chains resembling Strep  Results called to and read back by: Elvira Alicea  06/24/2023  13:10     CBC:   Recent Labs   Lab 06/22/23  2300 06/24/23  0504   WBC 12.01 16.07*   HGB 9.5* 8.2*   HCT 28.5* 24.5*    118*     CMP:   Recent Labs   Lab 06/22/23  2300 06/23/23  0632 06/24/23  0806    136 136   K 3.9 4.2 3.9    104 103   CO2 26 23 23   * 118* 107   BUN 30* 32* 39*   CREATININE 2.0* 1.9* 1.9*   CALCIUM 9.4 9.2 8.7   PROT 7.4  --   --    ALBUMIN 3.6  --   --    BILITOT 0.5  --   --    ALKPHOS 65  --   --    AST 18  --   --    ALT 11  --   --    ANIONGAP 11 9 10     Troponin:   Recent Labs   Lab 06/23/23  0632 06/23/23  0955 06/23/23  2048   TROPONINI 3.887* 5.956* 5.020*       Significant Imaging: I have reviewed all pertinent imaging results/findings within the past 24 hours.      Assessment/Plan:      * Epigastric pain  CT abdomen pelvis without acute findings.  Asymptomatic at this time.  -analgesics prn  -continue home PPI/antiacids    Bacteremia  -BC growing strep, final pending  -repeat BC today  -start Rocephin IV  -UA and CXR negative, afebrile, unclear etiology?    NSTEMI (non-ST elevated myocardial infarction)  -cardiology following, appreciate  -heparin gtt stopped today, started on Plavix/asa  -declined [facility], will need OP follow up    GERD (gastroesophageal reflux disease)  -Continue home PPI/Antacids as needed     MAGO (acute kidney injury)  Patient with acute kidney injury likely due to IVVD/dehydration and pre-renal azotemia MAGO is currently being treated.  Labs reviewed- Renal function/electrolytes with Estimated Creatinine Clearance: 22.8 mL/min (A) (based on SCr of 1.9 mg/dL (H)). according to latest data. Monitor urine output and serial BMP and adjust therapy as needed. Avoid nephrotoxins and renally dose meds for GFR listed above.     Elevated troponin  -tele monitoring  -troponin trend 0.623 > 1.671 > 3.887 > 5.956 > 5.020  -heparin gtt stopped today, started on ASA/Plavix  -cardiology consulted, offered LHC and declined d/t MAGO and age    Primary hypertension  Chronic, controlled  -Latest blood pressure and vitals reviewed-   Temp:  [97.4 °F (36.3 °C)-99.5 °F (37.5 °C)]   Pulse:  [66-99]   Resp:  [16-18]   BP: ()/(44-76)   SpO2:  [93 %-100 %] .   -Home meds for hypertension were reviewed and noted below.   Hypertension Medications         -While in the hospital, will manage blood pressure as follows; Adjust home antihypertensive regimen as follows- hold HCTZ and losartan d/t hypotension  -PRN anti-hypertensive medication if patient's BP > 160/100   -optimize pain management      Hypothyroidism  Patient has chronic hypothyroidism. TFTs reviewed-   Lab Results   Component Value Date    TSH 4.0 10/11/2007   -Will continue chronic levothyroxine and adjust for and clinical changes    Hyperlipidemia  Patient is chronically on statin.will continue for now. Last Lipid Panel:   Lab Results   Component Value Date    CHOL 157 06/05/2019    HDL 45 06/05/2019    LDLCALC 69.2 06/05/2019    TRIG 214 (H) 06/05/2019    CHOLHDL 28.7 06/05/2019       S/P AVR (aortic valve replacement)  -Echo showed patent valve  -follows Dr. Crane OP, will need follow up       VTE Risk Mitigation (From admission, onward)         Ordered     IP VTE HIGH RISK PATIENT  Once         06/23/23 0334     Place sequential compression device  Until discontinued         06/23/23 0334                Discharge Planning   JIMBO: 6/24/2023     Code Status: DNR   Is the patient medically ready for discharge?:      Reason for patient still in hospital (select all that apply): Patient new problem, Patient trending condition, Laboratory test, Treatment and Consult recommendations                 Elvira Yu NP  Department of Hospital Medicine   [facility] ([facility])

## 2023-06-24 NOTE — ASSESSMENT & PLAN NOTE
-cardiology following, appreciate  -heparin gtt stopped today, started on Plavix/asa  -declined LHC, will need OP follow up

## 2023-06-24 NOTE — ASSESSMENT & PLAN NOTE
-tele monitoring  -troponin trend 0.623 > 1.671 > 3.887 > 5.956 > 5.020  -heparin gtt stopped today, started on ASA/Plavix  -cardiology consulted, offered LHC and declined d/t MAGO and age

## 2023-06-25 LAB
ANION GAP SERPL CALC-SCNC: 11 MMOL/L (ref 8–16)
BASOPHILS # BLD AUTO: 0.03 K/UL (ref 0–0.2)
BASOPHILS NFR BLD: 0.2 % (ref 0–1.9)
BUN SERPL-MCNC: 42 MG/DL (ref 8–23)
CALCIUM SERPL-MCNC: 8.5 MG/DL (ref 8.7–10.5)
CHLORIDE SERPL-SCNC: 103 MMOL/L (ref 95–110)
CO2 SERPL-SCNC: 21 MMOL/L (ref 23–29)
CREAT SERPL-MCNC: 1.7 MG/DL (ref 0.5–1.4)
DIFFERENTIAL METHOD: ABNORMAL
EOSINOPHIL # BLD AUTO: 0 K/UL (ref 0–0.5)
EOSINOPHIL NFR BLD: 0.2 % (ref 0–8)
ERYTHROCYTE [DISTWIDTH] IN BLOOD BY AUTOMATED COUNT: 12.9 % (ref 11.5–14.5)
EST. GFR  (NO RACE VARIABLE): 38 ML/MIN/1.73 M^2
GLUCOSE SERPL-MCNC: 110 MG/DL (ref 70–110)
HCT VFR BLD AUTO: 26.6 % (ref 40–54)
HGB BLD-MCNC: 8.9 G/DL (ref 14–18)
IMM GRANULOCYTES # BLD AUTO: 0.24 K/UL (ref 0–0.04)
IMM GRANULOCYTES NFR BLD AUTO: 1.4 % (ref 0–0.5)
LYMPHOCYTES # BLD AUTO: 1.2 K/UL (ref 1–4.8)
LYMPHOCYTES NFR BLD: 7.5 % (ref 18–48)
MAGNESIUM SERPL-MCNC: 2.1 MG/DL (ref 1.6–2.6)
MCH RBC QN AUTO: 32.8 PG (ref 27–31)
MCHC RBC AUTO-ENTMCNC: 33.5 G/DL (ref 32–36)
MCV RBC AUTO: 98 FL (ref 82–98)
MONOCYTES # BLD AUTO: 0.9 K/UL (ref 0.3–1)
MONOCYTES NFR BLD: 5.5 % (ref 4–15)
NEUTROPHILS # BLD AUTO: 14.2 K/UL (ref 1.8–7.7)
NEUTROPHILS NFR BLD: 85.2 % (ref 38–73)
NRBC BLD-RTO: 0 /100 WBC
PHOSPHATE SERPL-MCNC: 2.5 MG/DL (ref 2.7–4.5)
PLATELET # BLD AUTO: 159 K/UL (ref 150–450)
PMV BLD AUTO: 11.4 FL (ref 9.2–12.9)
POCT GLUCOSE: 112 MG/DL (ref 70–110)
POCT GLUCOSE: 117 MG/DL (ref 70–110)
POTASSIUM SERPL-SCNC: 3.6 MMOL/L (ref 3.5–5.1)
RBC # BLD AUTO: 2.71 M/UL (ref 4.6–6.2)
SODIUM SERPL-SCNC: 135 MMOL/L (ref 136–145)
WBC # BLD AUTO: 16.61 K/UL (ref 3.9–12.7)

## 2023-06-25 PROCEDURE — 25000003 PHARM REV CODE 250: Performed by: INTERNAL MEDICINE

## 2023-06-25 PROCEDURE — 25000003 PHARM REV CODE 250: Performed by: NURSE PRACTITIONER

## 2023-06-25 PROCEDURE — 94761 N-INVAS EAR/PLS OXIMETRY MLT: CPT

## 2023-06-25 PROCEDURE — 87040 BLOOD CULTURE FOR BACTERIA: CPT | Performed by: NURSE PRACTITIONER

## 2023-06-25 PROCEDURE — 96367 TX/PROPH/DG ADDL SEQ IV INF: CPT

## 2023-06-25 PROCEDURE — 99232 PR SUBSEQUENT HOSPITAL CARE,LEVL II: ICD-10-PCS | Mod: ,,, | Performed by: INTERNAL MEDICINE

## 2023-06-25 PROCEDURE — 80048 BASIC METABOLIC PNL TOTAL CA: CPT | Performed by: INTERNAL MEDICINE

## 2023-06-25 PROCEDURE — 36415 COLL VENOUS BLD VENIPUNCTURE: CPT | Performed by: INTERNAL MEDICINE

## 2023-06-25 PROCEDURE — 21400001 HC TELEMETRY ROOM

## 2023-06-25 PROCEDURE — 99232 SBSQ HOSP IP/OBS MODERATE 35: CPT | Mod: ,,, | Performed by: INTERNAL MEDICINE

## 2023-06-25 PROCEDURE — 96366 THER/PROPH/DIAG IV INF ADDON: CPT

## 2023-06-25 PROCEDURE — 84100 ASSAY OF PHOSPHORUS: CPT | Performed by: INTERNAL MEDICINE

## 2023-06-25 PROCEDURE — 85025 COMPLETE CBC W/AUTO DIFF WBC: CPT | Performed by: INTERNAL MEDICINE

## 2023-06-25 PROCEDURE — 83735 ASSAY OF MAGNESIUM: CPT | Performed by: INTERNAL MEDICINE

## 2023-06-25 PROCEDURE — 25000003 PHARM REV CODE 250: Performed by: FAMILY MEDICINE

## 2023-06-25 PROCEDURE — 63600175 PHARM REV CODE 636 W HCPCS: Performed by: INTERNAL MEDICINE

## 2023-06-25 RX ORDER — METHOCARBAMOL 500 MG/1
500 TABLET, FILM COATED ORAL 3 TIMES DAILY PRN
Status: DISCONTINUED | OUTPATIENT
Start: 2023-06-25 | End: 2023-06-28 | Stop reason: HOSPADM

## 2023-06-25 RX ADMIN — CEFTRIAXONE 2 G: 2 INJECTION, POWDER, FOR SOLUTION INTRAMUSCULAR; INTRAVENOUS at 03:06

## 2023-06-25 RX ADMIN — ASPIRIN 81 MG CHEWABLE TABLET 81 MG: 81 TABLET CHEWABLE at 09:06

## 2023-06-25 RX ADMIN — CLOPIDOGREL BISULFATE 75 MG: 75 TABLET ORAL at 09:06

## 2023-06-25 RX ADMIN — ACETAMINOPHEN 650 MG: 325 TABLET ORAL at 01:06

## 2023-06-25 RX ADMIN — METHOCARBAMOL 500 MG: 500 TABLET ORAL at 10:06

## 2023-06-25 RX ADMIN — METHOCARBAMOL 500 MG: 500 TABLET ORAL at 01:06

## 2023-06-25 RX ADMIN — POTASSIUM PHOSPHATE, MONOBASIC AND POTASSIUM PHOSPHATE, DIBASIC 15 MMOL: 224; 236 INJECTION, SOLUTION, CONCENTRATE INTRAVENOUS at 10:06

## 2023-06-25 RX ADMIN — LEVOTHYROXINE SODIUM 112 MCG: 0.11 TABLET ORAL at 05:06

## 2023-06-25 RX ADMIN — PANTOPRAZOLE SODIUM 40 MG: 40 TABLET, DELAYED RELEASE ORAL at 08:06

## 2023-06-25 RX ADMIN — Medication 6 MG: at 08:06

## 2023-06-25 RX ADMIN — FERROUS SULFATE TAB 325 MG (65 MG ELEMENTAL FE) 1 EACH: 325 (65 FE) TAB at 09:06

## 2023-06-25 RX ADMIN — ACETAMINOPHEN 650 MG: 325 TABLET ORAL at 05:06

## 2023-06-25 NOTE — SUBJECTIVE & OBJECTIVE
Interval History: awake, alert, sitting up in chair, son at bedside, denies any pain currently, says he feels better today. BP improved after bolus. Repeat BC today, continue Rocephin.     Review of Systems  Objective:     Vital Signs (Most Recent):  Temp: 98.2 °F (36.8 °C) (06/25/23 0716)  Pulse: 70 (06/25/23 0914)  Resp: 16 (06/25/23 0914)  BP: 127/71 (06/25/23 0716)  SpO2: (!) 94 % (06/25/23 0914) Vital Signs (24h Range):  Temp:  [97.4 °F (36.3 °C)-98.5 °F (36.9 °C)] 98.2 °F (36.8 °C)  Pulse:  [64-87] 70  Resp:  [14-18] 16  SpO2:  [94 %-100 %] 94 %  BP: (102-131)/(56-71) 127/71     Weight: 62.4 kg (137 lb 9.1 oz)  Body mass index is 19.74 kg/m².    Intake/Output Summary (Last 24 hours) at 6/25/2023 1206  Last data filed at 6/25/2023 1139  Gross per 24 hour   Intake 98.83 ml   Output --   Net 98.83 ml           Physical Exam  Vitals and nursing note reviewed.   Constitutional:       Appearance: Normal appearance.   Cardiovascular:      Rate and Rhythm: Normal rate and regular rhythm.      Heart sounds: Murmur heard.   Pulmonary:      Effort: Pulmonary effort is normal.      Breath sounds: Normal breath sounds.   Abdominal:      General: Bowel sounds are normal.      Palpations: Abdomen is soft.      Tenderness: There is abdominal tenderness.   Musculoskeletal:         General: Normal range of motion.   Skin:     General: Skin is warm and dry.   Neurological:      General: No focal deficit present.      Mental Status: He is alert and oriented to person, place, and time.   Psychiatric:         Mood and Affect: Mood normal.         Behavior: Behavior normal.           Significant Labs: All pertinent labs within the past 24 hours have been reviewed.  Blood Culture:   No results for input(s): LABBLOO in the last 48 hours.    CBC:   Recent Labs   Lab 06/24/23  0504 06/25/23  0753   WBC 16.07* 16.61*   HGB 8.2* 8.9*   HCT 24.5* 26.6*   * 159       CMP:   Recent Labs   Lab 06/24/23  0806 06/25/23  0447     135*   K 3.9 3.6    103   CO2 23 21*    110   BUN 39* 42*   CREATININE 1.9* 1.7*   CALCIUM 8.7 8.5*   ANIONGAP 10 11       Troponin:   Recent Labs   Lab 06/23/23 2048   TROPONINI 5.020*         Significant Imaging: I have reviewed all pertinent imaging results/findings within the past 24 hours.

## 2023-06-25 NOTE — PLAN OF CARE
Problem: Adult Inpatient Plan of Care  Goal: Absence of Hospital-Acquired Illness or Injury  Outcome: Ongoing, Progressing  Intervention: Identify and Manage Fall Risk  Flowsheets (Taken 6/25/2023 1705)  Safety Promotion/Fall Prevention:   assistive device/personal item within reach   bed alarm set   family to remain at bedside   Fall Risk reviewed with patient/family   medications reviewed   nonskid shoes/socks when out of bed   side rails raised x 2     Problem: Oral Intake Inadequate (Acute Kidney Injury/Impairment)  Goal: Optimal Nutrition Intake  Outcome: Ongoing, Progressing     Problem: Renal Function Impairment (Acute Kidney Injury/Impairment)  Goal: Effective Renal Function  Outcome: Ongoing, Progressing     Problem: Fall Injury Risk  Goal: Absence of Fall and Fall-Related Injury  Outcome: Ongoing, Progressing     Problem: Adult Inpatient Plan of Care  Goal: Plan of Care Review  Flowsheets (Taken 6/25/2023 1705)  Plan of Care Reviewed With:   patient   daughter

## 2023-06-25 NOTE — PLAN OF CARE
Problem: Adult Inpatient Plan of Care  Goal: Plan of Care Review  Outcome: Ongoing, Progressing  Goal: Absence of Hospital-Acquired Illness or Injury  Outcome: Ongoing, Progressing  Intervention: Prevent Skin Injury  Flowsheets (Taken 6/24/2023 1905)  Skin Protection:   adhesive use limited   incontinence pads utilized   tubing/devices free from skin contact  Intervention: Prevent and Manage VTE (Venous Thromboembolism) Risk  Flowsheets (Taken 6/24/2023 1905)  VTE Prevention/Management:   fluids promoted   bleeding risk assessed   dorsiflexion/plantar flexion performed  Intervention: Prevent Infection  Flowsheets (Taken 6/24/2023 1905)  Infection Prevention:   environmental surveillance performed   rest/sleep promoted   single patient room provided   equipment surfaces disinfected  Goal: Optimal Comfort and Wellbeing  Outcome: Ongoing, Progressing  Intervention: Monitor Pain and Promote Comfort  Flowsheets (Taken 6/24/2023 1905)  Pain Management Interventions:   care clustered   quiet environment facilitated   pain management plan reviewed with patient/caregiver   position adjusted  Intervention: Provide Person-Centered Care  Flowsheets (Taken 6/24/2023 1905)  Trust Relationship/Rapport:   care explained   questions encouraged   choices provided   reassurance provided   emotional support provided   thoughts/feelings acknowledged   empathic listening provided   questions answered     Problem: Fluid and Electrolyte Imbalance (Acute Kidney Injury/Impairment)  Goal: Fluid and Electrolyte Balance  Outcome: Ongoing, Progressing  Intervention: Monitor and Manage Fluid and Electrolyte Balance  Flowsheets (Taken 6/24/2023 1905)  Fluid/Electrolyte Management: fluids provided     Problem: Pain Acute  Goal: Acceptable Pain Control and Functional Ability  Outcome: Ongoing, Progressing  Intervention: Develop Pain Management Plan  Flowsheets (Taken 6/24/2023 1905)  Pain Management Interventions:   care clustered   quiet  environment facilitated   pain management plan reviewed with patient/caregiver   position adjusted  Intervention: Prevent or Manage Pain  Flowsheets (Taken 6/24/2023 1905)  Sleep/Rest Enhancement:   awakenings minimized   regular sleep/rest pattern promoted  Sensory Stimulation Regulation:   quiet environment promoted   care clustered     Problem: Fall Injury Risk  Goal: Absence of Fall and Fall-Related Injury  Outcome: Ongoing, Progressing  Intervention: Identify and Manage Contributors  Flowsheets (Taken 6/24/2023 1905)  Self-Care Promotion: BADL personal objects within reach

## 2023-06-25 NOTE — ASSESSMENT & PLAN NOTE
6/25/2023   Awaiting cultures result   Pending ID input   Discussed with family concern for bacteremia in the presence of a prosthetic valve   Pending ID input   Possible need for STARLA if patient can tolerate

## 2023-06-25 NOTE — ASSESSMENT & PLAN NOTE
Chronic, controlled  -Latest blood pressure and vitals reviewed-   Temp:  [97.4 °F (36.3 °C)-98.5 °F (36.9 °C)]   Pulse:  [64-87]   Resp:  [14-18]   BP: (102-131)/(56-71)   SpO2:  [94 %-100 %] .   -Home meds for hypertension were reviewed and noted below.   Hypertension Medications         -While in the hospital, will manage blood pressure as follows; Adjust home antihypertensive regimen as follows- hold HCTZ and losartan d/t hypotension and MAGO  -PRN anti-hypertensive medication if patient's BP > 160/100   -optimize pain management

## 2023-06-25 NOTE — ASSESSMENT & PLAN NOTE
CT abdomen pelvis without acute findings.  Asymptomatic at this time.  -analgesics prn  -continue home PPI/antiacids  -denies any further pain

## 2023-06-25 NOTE — PROGRESS NOTES
[facility] Medicine  Progress Note    Patient Name: Juan Castellanos  MRN: [id number]  Patient Class: OP- Observation   Admission Date: 6/22/2023  Length of Stay: 0 days  Attending Physician: Luba Solano MD  Primary Care Provider: Devyn Beck MD        Subjective:     Principal Problem:NSTEMI (non-ST elevated myocardial infarction)      HPI:  Juan Castellanos is a 89 y.o. male with a PMH  has a past medical history of Allergy, Anxiety, Arthritis, Blood transfusion, Cataract, Depression, GERD (gastroesophageal reflux disease), Heart murmur, Hypercholesterolemia, Primary hypertension (11/24/2021), Prostate cancer, and Thyroid disease.  Presented to the ER accompanied by family for evaluation of epigastric/right upper quadrant abdominal pain that radiated to his right lower quadrant/umbilical region.  Patient reports he was sitting down watching that she baseball game and reports that the pain started all of a sudden.  Patient believed it was his acid reflux symptoms.  Patient states he took Rosa-[place] without any relief of his symptoms.  Only reported cardiac history was aortic valve replacement.  Denies any stents or history of mi/bypass.  Currently asymptomatic and denies any other associated symptoms accompanied with abdominal pain.  ER workup showed BUN/creatinine of 30/2.0 (previously 16/1.3), , troponin of 0.623, and EKG showed sinus tach with rate of 102 QT/QTC of 350/456.  Remainder of workup was unremarkable.  Patient received total of 40 Protonix and GI cocktail in ED.  Hospital Medicine consulted to admit patient for cardiac workup.  Patient family are in agreement with treatment plan.  Patient will be admitted under observation status.  PCP: Devyn Beck        Overview/Hospital Course:  89 y/o male admitted with abdominal pain, LUQ and radiates across the top, exacerbated after he eats, with no improvement after taking rosa-seltzer. Troponin was bumped on  admission and continued to trend up. Started on heparin gtt nomogram. Cr 1.9 (near his baseline of 1.6-1.9). Cardiology consulted for assistance. BC obtained on admission, growing strep, started on Rocephin IV (6/24), repeat BC 6/25, pending.  Echo EF 60%, grade II LV diastolic dysfunction, mild MR, TR with pulmonary hypertension. EKG S tachy.      Interval History: awake, alert, sitting up in chair, son at bedside, denies any pain currently, says he feels better today. BP improved after bolus. Repeat BC today, continue Rocephin.     Review of Systems  Objective:     Vital Signs (Most Recent):  Temp: 98.2 °F (36.8 °C) (06/25/23 0716)  Pulse: 70 (06/25/23 0914)  Resp: 16 (06/25/23 0914)  BP: 127/71 (06/25/23 0716)  SpO2: (!) 94 % (06/25/23 0914) Vital Signs (24h Range):  Temp:  [97.4 °F (36.3 °C)-98.5 °F (36.9 °C)] 98.2 °F (36.8 °C)  Pulse:  [64-87] 70  Resp:  [14-18] 16  SpO2:  [94 %-100 %] 94 %  BP: (102-131)/(56-71) 127/71     Weight: 62.4 kg (137 lb 9.1 oz)  Body mass index is 19.74 kg/m².    Intake/Output Summary (Last 24 hours) at 6/25/2023 1206  Last data filed at 6/25/2023 1139  Gross per 24 hour   Intake 98.83 ml   Output --   Net 98.83 ml           Physical Exam  Vitals and nursing note reviewed.   Constitutional:       Appearance: Normal appearance.   Cardiovascular:      Rate and Rhythm: Normal rate and regular rhythm.      Heart sounds: Murmur heard.   Pulmonary:      Effort: Pulmonary effort is normal.      Breath sounds: Normal breath sounds.   Abdominal:      General: Bowel sounds are normal.      Palpations: Abdomen is soft.      Tenderness: There is abdominal tenderness.   Musculoskeletal:         General: Normal range of motion.   Skin:     General: Skin is warm and dry.   Neurological:      General: No focal deficit present.      Mental Status: He is alert and oriented to person, place, and time.   Psychiatric:         Mood and Affect: Mood normal.         Behavior: Behavior normal.            Significant Labs: All pertinent labs within the past 24 hours have been reviewed.  Blood Culture:   No results for input(s): LABBLOO in the last 48 hours.    CBC:   Recent Labs   Lab 06/24/23  0504 06/25/23  0753   WBC 16.07* 16.61*   HGB 8.2* 8.9*   HCT 24.5* 26.6*   * 159       CMP:   Recent Labs   Lab 06/24/23  0806 06/25/23  0447    135*   K 3.9 3.6    103   CO2 23 21*    110   BUN 39* 42*   CREATININE 1.9* 1.7*   CALCIUM 8.7 8.5*   ANIONGAP 10 11       Troponin:   Recent Labs   Lab 06/23/23  2048   TROPONINI 5.020*         Significant Imaging: I have reviewed all pertinent imaging results/findings within the past 24 hours.      Assessment/Plan:      * NSTEMI (non-ST elevated myocardial infarction)  -tele monitoring  -troponin trend 0.623 > 1.671 > 3.887 > 5.956 > 5.020  -cont ASA/Plavix (heparin gtt stopped 6/24)  -cardiology consulted, offered LHC and family unsure if they want to proceed, and cards is hesitant with advanced age and MAGO    Elevated troponin  -see above plan of care    Bacteremia  -BC growing strep, final pending  -repeat BC today  -cont Rocephin IV  -UA and CXR negative, afebrile, unclear etiology?  -consult ID for assistance, patient has h/o artifical heart valve, may need STARLA?    GERD (gastroesophageal reflux disease)  -Continue home PPI/Antacids as needed     MAGO (acute kidney injury)  Patient with acute kidney injury likely due to IVVD/dehydration and pre-renal azotemia MAGO is currently being treated. Labs reviewed- Renal function/electrolytes with Estimated Creatinine Clearance: 25.5 mL/min (A) (based on SCr of 1.7 mg/dL (H)). according to latest data. Monitor urine output and serial BMP and adjust therapy as needed. Avoid nephrotoxins and renally dose meds for GFR listed above.   -improved after 500 ml bolus given 6/24  -cont to hold HCTZ and losartan  -repeat labs in am    Epigastric pain  CT abdomen pelvis without acute findings.  Asymptomatic at this  time.  -analgesics prn  -continue home PPI/antiacids  -denies any further pain    Primary hypertension  Chronic, controlled  -Latest blood pressure and vitals reviewed-   Temp:  [97.4 °F (36.3 °C)-98.5 °F (36.9 °C)]   Pulse:  [64-87]   Resp:  [14-18]   BP: (102-131)/(56-71)   SpO2:  [94 %-100 %] .   -Home meds for hypertension were reviewed and noted below.   Hypertension Medications         -While in the hospital, will manage blood pressure as follows; Adjust home antihypertensive regimen as follows- hold HCTZ and losartan d/t hypotension and MAGO  -PRN anti-hypertensive medication if patient's BP > 160/100   -optimize pain management    Hypothyroidism  Patient has chronic hypothyroidism. TFTs reviewed-   Lab Results   Component Value Date    TSH 4.0 10/11/2007   -Will continue chronic levothyroxine and adjust for and clinical changes    Hyperlipidemia  Patient is chronically on statin.will continue for now. Last Lipid Panel:   Lab Results   Component Value Date    CHOL 157 06/05/2019    HDL 45 06/05/2019    LDLCALC 69.2 06/05/2019    TRIG 214 (H) 06/05/2019    CHOLHDL 28.7 06/05/2019       S/P AVR (aortic valve replacement)  -Echo showed patent valve  -follows Dr. Crane OP, will need follow up   -ID consulted with bacteremia and h/o heart valve, may need STARLA    VTE Risk Mitigation (From admission, onward)         Ordered     IP VTE HIGH RISK PATIENT  Once         06/23/23 0334     Place sequential compression device  Until discontinued         06/23/23 0334                Discharge Planning   JIMBO: 6/24/2023     Code Status: DNR   Is the patient medically ready for discharge?: No    Reason for patient still in hospital (select all that apply): Patient trending condition, Laboratory test, Treatment and Consult recommendations                 Elvira Yu NP  Department of Hospital Medicine   [facility] ([facility])

## 2023-06-25 NOTE — ASSESSMENT & PLAN NOTE
Patient with acute kidney injury likely due to IVVD/dehydration and pre-renal azotemia MAGO is currently being treated. Labs reviewed- Renal function/electrolytes with Estimated Creatinine Clearance: 25.5 mL/min (A) (based on SCr of 1.7 mg/dL (H)). according to latest data. Monitor urine output and serial BMP and adjust therapy as needed. Avoid nephrotoxins and renally dose meds for GFR listed above.   -improved after 500 ml bolus given 6/24  -cont to hold HCTZ and losartan  -repeat labs in am

## 2023-06-25 NOTE — ASSESSMENT & PLAN NOTE
-tele monitoring  -troponin trend 0.623 > 1.671 > 3.887 > 5.956 > 5.020  -cont ASA/Plavix (heparin gtt stopped 6/24)  -cardiology consulted, offered LHC and family unsure if they want to proceed, and cards is hesitant with advanced age and MAGO

## 2023-06-25 NOTE — ASSESSMENT & PLAN NOTE
-BC growing strep, final pending  -repeat BC today  -cont Rocephin IV  -UA and CXR negative, afebrile, unclear etiology?  -consult ID for assistance, patient has h/o artifical heart valve, may need STARLA?

## 2023-06-25 NOTE — SUBJECTIVE & OBJECTIVE
Objective:     Vital Signs (Most Recent):  Temp: 99.1 °F (37.3 °C) (06/25/23 1541)  Pulse: 92 (06/25/23 1541)  Resp: 18 (06/25/23 1541)  BP: (!) 99/52 (06/25/23 1541)  SpO2: (!) 93 % (06/25/23 1541) Vital Signs (24h Range):  Temp:  [97.7 °F (36.5 °C)-99.1 °F (37.3 °C)] 99.1 °F (37.3 °C)  Pulse:  [64-92] 92  Resp:  [14-18] 18  SpO2:  [93 %-100 %] 93 %  BP: ()/(52-71) 99/52     Weight: 62.4 kg (137 lb 9.1 oz)  Body mass index is 19.74 kg/m².     SpO2: (!) 93 %         Intake/Output Summary (Last 24 hours) at 6/25/2023 1626  Last data filed at 6/25/2023 1442  Gross per 24 hour   Intake 348.83 ml   Output --   Net 348.83 ml       Lines/Drains/Airways       Peripheral Intravenous Line  Duration                  Peripheral IV - Single Lumen 06/22/23 2208 20 G Anterior;Right Forearm 2 days                       Significant Labs:   Recent Lab Results  (Last 5 results in the past 24 hours)        06/25/23  1539   06/25/23  1251   06/25/23  0753   06/25/23  0447   06/24/23  1803        Anion Gap       11         Baso #     0.03           Basophil %     0.2           BUN       42         Calcium       8.5         Chloride       103         CO2       21         Creatinine       1.7         Differential Method     Automated           eGFR       38         Eos #     0.0           Eosinophil %     0.2           Glucose       110         Gran # (ANC)     14.2           Gran %     85.2           Hematocrit     26.6           Hemoglobin     8.9           Immature Grans (Abs)     0.24  Comment: Mild elevation in immature granulocytes is non specific and   can be seen in a variety of conditions including stress response,   acute inflammation, trauma and pregnancy. Correlation with other   laboratory and clinical findings is essential.             Immature Granulocytes     1.4           Lymph #     1.2           Lymph %     7.5           Magnesium       2.1         MCH     32.8           MCHC     33.5           MCV     98            Mono #     0.9           Mono %     5.5           MPV     11.4           nRBC     0           Phosphorus       2.5         Platelets     159           POCT Glucose 117   112       93       Potassium       3.6         RBC     2.71           RDW     12.9           Sodium       135         WBC     16.61

## 2023-06-25 NOTE — ASSESSMENT & PLAN NOTE
-Echo showed patent valve  -follows Dr. Crane OP, will need follow up   -ID consulted with bacteremia and h/o heart valve, may need STARLA

## 2023-06-25 NOTE — PROGRESS NOTES
"[facility] - Telemetry ([facility])  Cardiology  Progress Note    Patient Name: Juan Castellanos  MRN: [id number]  Admission Date: 6/22/2023  Hospital Length of Stay: 0 days  Code Status: DNR   Attending Physician: Luba Solano MD   Primary Care Physician: Devyn Beck MD  Expected Discharge Date: 6/24/2023  Principal Problem:Bacteremia    Subjective:     Hospital Course:   Mr. Castellanos is a 89 year old male patient whose current medical conditions include AS s/p AVR, prostate cancer, thyroid disease, HTN, hyperlipidemia, and CAD who presented to [facility] ED thsi AM with a chief complaint of left-sided/generalized abdominal discomfort that onset yesterday evening. Associated symptoms included indigestion like discomfort and decreased po intake. Patient denied any associated palpitations, LH, dizziness, fever, chills, nausea, vomiting, or diaphoresis. Took Fanny-Zelienople to try to relieve symptoms. Initial workup in ED revealed MAGO (creatinine of 2.0), troponin of 0.623, and BNP of 254 and patient was subsequently admitted for further evaluation and treatment. Cardiology consulted to assist with management. Patient seen and examined today, resting in bed with family in room. Feels ok. Denies any overt chest pain or tightness but family does report he has been complaining of "acid reflux" symptoms over the past few days. Patient is compliant with his medications. Followed on OP basis by Dr. Bueno. Troponin 0.623>1.671>3.887. H/H 9.5/28.5, recent FOBT negative. Echo pending. EKG reviewed, no acute ischemic changes appreciated. CT of abdomen with NAF.       6.24.2023  Trop trended down   Discussed with family ,   Bacteremic   Chest pain free , dementia   Daughter and family wants conservative management     6/25/2023  Looks ill   Daughter and son in law in room   Denies chest pain   Updated about bactermia , possible need for yandel, family is agreeable if needed, patient looking very fatigued         Objective:     Vital " Signs (Most Recent):  Temp: 99.1 °F (37.3 °C) (06/25/23 1541)  Pulse: 92 (06/25/23 1541)  Resp: 18 (06/25/23 1541)  BP: (!) 99/52 (06/25/23 1541)  SpO2: (!) 93 % (06/25/23 1541) Vital Signs (24h Range):  Temp:  [97.7 °F (36.5 °C)-99.1 °F (37.3 °C)] 99.1 °F (37.3 °C)  Pulse:  [64-92] 92  Resp:  [14-18] 18  SpO2:  [93 %-100 %] 93 %  BP: ()/(52-71) 99/52     Weight: 62.4 kg (137 lb 9.1 oz)  Body mass index is 19.74 kg/m².     SpO2: (!) 93 %         Intake/Output Summary (Last 24 hours) at 6/25/2023 1626  Last data filed at 6/25/2023 1442  Gross per 24 hour   Intake 348.83 ml   Output --   Net 348.83 ml       Lines/Drains/Airways       Peripheral Intravenous Line  Duration                  Peripheral IV - Single Lumen 06/22/23 2208 20 G Anterior;Right Forearm 2 days                       Significant Labs:   Recent Lab Results  (Last 5 results in the past 24 hours)        06/25/23  1539   06/25/23  1251   06/25/23  0753   06/25/23  0447   06/24/23  1803        Anion Gap       11         Baso #     0.03           Basophil %     0.2           BUN       42         Calcium       8.5         Chloride       103         CO2       21         Creatinine       1.7         Differential Method     Automated           eGFR       38         Eos #     0.0           Eosinophil %     0.2           Glucose       110         Gran # (ANC)     14.2           Gran %     85.2           Hematocrit     26.6           Hemoglobin     8.9           Immature Grans (Abs)     0.24  Comment: Mild elevation in immature granulocytes is non specific and   can be seen in a variety of conditions including stress response,   acute inflammation, trauma and pregnancy. Correlation with other   laboratory and clinical findings is essential.             Immature Granulocytes     1.4           Lymph #     1.2           Lymph %     7.5           Magnesium       2.1         MCH     32.8           MCHC     33.5           MCV     98           Mono #     0.9            Mono %     5.5           MPV     11.4           nRBC     0           Phosphorus       2.5         Platelets     159           POCT Glucose 117   112       93       Potassium       3.6         RBC     2.71           RDW     12.9           Sodium       135         WBC     16.61                                Assessment and Plan:       * Bacteremia  6/25/2023   Awaiting cultures result   Pending ID input   Discussed with family concern for bacteremia in the presence of a prosthetic valve   Pending ID input   Possible need for STARLA if patient can tolerate     NSTEMI (non-ST elevated myocardial infarction)  -Presents with acid reflux type symptoms over the past few days  -Troponin 0.623>1.671>3.887, repeat pending  -Echo pending  -Continue ASA, statin, heparin gtt  -BP soft, will maximize regimen as tolerated  -Discussed invasive mgmt with [facility] once renal function stabilizes/improves, family/pt unsure about proceeding at present time. Continue OMT in interim.      6.24.2023  Asa, plavix and ppi on discharge  See HPI   Conservative management   Treated for bacteremia by hospital medicine , hx of valve, may need STARLA , will await for final cultures , discussed with HM     MAGO (acute kidney injury)  -Likely in setting of IVVD/decreased appetite/on HCTZ as OP  -Recommend gentle IV hydration    Elevated troponin  -See plan under NSTEMI    Epigastric pain  -Unclear etiology  -CT of abdomen un-revealing  -Lipase normal  -Mgmt as per primary team    Primary hypertension  -BP soft  -Hold ARB/HCTZ given MAGO    Hyperlipidemia  -Statin    S/P AVR (aortic valve replacement)  -Reassess by echo        VTE Risk Mitigation (From admission, onward)         Ordered     IP VTE HIGH RISK PATIENT  Once         06/23/23 0334     Place sequential compression device  Until discontinued         06/23/23 0334                Jonatan Valencia MD  Cardiology  [facility] - Telemetry ([facility])

## 2023-06-26 LAB
ANION GAP SERPL CALC-SCNC: 10 MMOL/L (ref 8–16)
ANISOCYTOSIS BLD QL SMEAR: SLIGHT
BACTERIA BLD CULT: ABNORMAL
BASOPHILS # BLD AUTO: 0.02 K/UL (ref 0–0.2)
BASOPHILS NFR BLD: 0.1 % (ref 0–1.9)
BUN SERPL-MCNC: 37 MG/DL (ref 8–23)
CALCIUM SERPL-MCNC: 8.5 MG/DL (ref 8.7–10.5)
CHLORIDE SERPL-SCNC: 102 MMOL/L (ref 95–110)
CO2 SERPL-SCNC: 23 MMOL/L (ref 23–29)
CREAT SERPL-MCNC: 1.6 MG/DL (ref 0.5–1.4)
DIFFERENTIAL METHOD: ABNORMAL
EOSINOPHIL # BLD AUTO: 0 K/UL (ref 0–0.5)
EOSINOPHIL NFR BLD: 0.1 % (ref 0–8)
ERYTHROCYTE [DISTWIDTH] IN BLOOD BY AUTOMATED COUNT: 12.9 % (ref 11.5–14.5)
EST. GFR  (NO RACE VARIABLE): 41 ML/MIN/1.73 M^2
GLUCOSE SERPL-MCNC: 99 MG/DL (ref 70–110)
HCT VFR BLD AUTO: 23.5 % (ref 40–54)
HGB BLD-MCNC: 7.9 G/DL (ref 14–18)
IMM GRANULOCYTES # BLD AUTO: 0.08 K/UL (ref 0–0.04)
IMM GRANULOCYTES NFR BLD AUTO: 0.6 % (ref 0–0.5)
LYMPHOCYTES # BLD AUTO: 0.9 K/UL (ref 1–4.8)
LYMPHOCYTES NFR BLD: 6.5 % (ref 18–48)
MAGNESIUM SERPL-MCNC: 2 MG/DL (ref 1.6–2.6)
MCH RBC QN AUTO: 33.1 PG (ref 27–31)
MCHC RBC AUTO-ENTMCNC: 33.6 G/DL (ref 32–36)
MCV RBC AUTO: 98 FL (ref 82–98)
MONOCYTES # BLD AUTO: 0.7 K/UL (ref 0.3–1)
MONOCYTES NFR BLD: 5.4 % (ref 4–15)
NEUTROPHILS # BLD AUTO: 11.7 K/UL (ref 1.8–7.7)
NEUTROPHILS NFR BLD: 87.3 % (ref 38–73)
NRBC BLD-RTO: 0 /100 WBC
PHOSPHATE SERPL-MCNC: 3.1 MG/DL (ref 2.7–4.5)
PLATELET # BLD AUTO: 127 K/UL (ref 150–450)
PLATELET BLD QL SMEAR: ABNORMAL
PMV BLD AUTO: 11.1 FL (ref 9.2–12.9)
POTASSIUM SERPL-SCNC: 3.5 MMOL/L (ref 3.5–5.1)
RBC # BLD AUTO: 2.39 M/UL (ref 4.6–6.2)
SODIUM SERPL-SCNC: 135 MMOL/L (ref 136–145)
WBC # BLD AUTO: 13.36 K/UL (ref 3.9–12.7)

## 2023-06-26 PROCEDURE — 94761 N-INVAS EAR/PLS OXIMETRY MLT: CPT

## 2023-06-26 PROCEDURE — 25000003 PHARM REV CODE 250: Performed by: NURSE PRACTITIONER

## 2023-06-26 PROCEDURE — 25000003 PHARM REV CODE 250: Performed by: INTERNAL MEDICINE

## 2023-06-26 PROCEDURE — 25000003 PHARM REV CODE 250: Performed by: FAMILY MEDICINE

## 2023-06-26 PROCEDURE — 36415 COLL VENOUS BLD VENIPUNCTURE: CPT | Performed by: INTERNAL MEDICINE

## 2023-06-26 PROCEDURE — 99223 PR INITIAL HOSPITAL CARE,LEVL III: ICD-10-PCS | Mod: NSCH,,, | Performed by: INTERNAL MEDICINE

## 2023-06-26 PROCEDURE — 99233 SBSQ HOSP IP/OBS HIGH 50: CPT | Mod: ,,, | Performed by: INTERNAL MEDICINE

## 2023-06-26 PROCEDURE — 84100 ASSAY OF PHOSPHORUS: CPT | Performed by: INTERNAL MEDICINE

## 2023-06-26 PROCEDURE — 63600175 PHARM REV CODE 636 W HCPCS: Performed by: INTERNAL MEDICINE

## 2023-06-26 PROCEDURE — 80048 BASIC METABOLIC PNL TOTAL CA: CPT | Performed by: INTERNAL MEDICINE

## 2023-06-26 PROCEDURE — 99233 PR SUBSEQUENT HOSPITAL CARE,LEVL III: ICD-10-PCS | Mod: ,,, | Performed by: INTERNAL MEDICINE

## 2023-06-26 PROCEDURE — 21400001 HC TELEMETRY ROOM

## 2023-06-26 PROCEDURE — 83735 ASSAY OF MAGNESIUM: CPT | Performed by: INTERNAL MEDICINE

## 2023-06-26 PROCEDURE — 99223 1ST HOSP IP/OBS HIGH 75: CPT | Mod: NSCH,,, | Performed by: INTERNAL MEDICINE

## 2023-06-26 PROCEDURE — 85025 COMPLETE CBC W/AUTO DIFF WBC: CPT | Performed by: INTERNAL MEDICINE

## 2023-06-26 RX ORDER — TRAMADOL HYDROCHLORIDE 50 MG/1
50 TABLET ORAL EVERY 6 HOURS PRN
Status: DISCONTINUED | OUTPATIENT
Start: 2023-06-26 | End: 2023-06-28 | Stop reason: HOSPADM

## 2023-06-26 RX ADMIN — ASPIRIN 81 MG CHEWABLE TABLET 81 MG: 81 TABLET CHEWABLE at 09:06

## 2023-06-26 RX ADMIN — LEVOTHYROXINE SODIUM 112 MCG: 0.11 TABLET ORAL at 05:06

## 2023-06-26 RX ADMIN — PANTOPRAZOLE SODIUM 40 MG: 40 TABLET, DELAYED RELEASE ORAL at 08:06

## 2023-06-26 RX ADMIN — METHOCARBAMOL 500 MG: 500 TABLET ORAL at 05:06

## 2023-06-26 RX ADMIN — ACETAMINOPHEN 650 MG: 325 TABLET ORAL at 05:06

## 2023-06-26 RX ADMIN — TRAMADOL HYDROCHLORIDE 50 MG: 50 TABLET, COATED ORAL at 07:06

## 2023-06-26 RX ADMIN — FERROUS SULFATE TAB 325 MG (65 MG ELEMENTAL FE) 1 EACH: 325 (65 FE) TAB at 09:06

## 2023-06-26 RX ADMIN — TRAMADOL HYDROCHLORIDE 50 MG: 50 TABLET, COATED ORAL at 12:06

## 2023-06-26 RX ADMIN — CLOPIDOGREL BISULFATE 75 MG: 75 TABLET ORAL at 09:06

## 2023-06-26 RX ADMIN — Medication 6 MG: at 08:06

## 2023-06-26 RX ADMIN — CEFTRIAXONE 2 G: 2 INJECTION, POWDER, FOR SOLUTION INTRAMUSCULAR; INTRAVENOUS at 02:06

## 2023-06-26 NOTE — SUBJECTIVE & OBJECTIVE
Review of Systems   Constitutional: Positive for malaise/fatigue.   HENT: Negative.     Eyes: Negative.    Cardiovascular: Negative.    Respiratory: Negative.     Endocrine: Negative.    Hematologic/Lymphatic: Negative.    Skin: Negative.    Musculoskeletal: Negative.    Gastrointestinal: Negative.    Genitourinary: Negative.    Neurological: Negative.    Psychiatric/Behavioral:  Positive for memory loss.    Allergic/Immunologic: Negative.    Objective:     Vital Signs (Most Recent):  Temp: 98.1 °F (36.7 °C) (06/26/23 0700)  Pulse: 66 (06/26/23 0900)  Resp: 18 (06/26/23 0734)  BP: (!) 100/57 (06/26/23 0700)  SpO2: 98 % (06/26/23 0734) Vital Signs (24h Range):  Temp:  [97.9 °F (36.6 °C)-99.1 °F (37.3 °C)] 98.1 °F (36.7 °C)  Pulse:  [63-92] 66  Resp:  [17-18] 18  SpO2:  [93 %-99 %] 98 %  BP: ()/(52-66) 100/57     Weight: 62.4 kg (137 lb 9.1 oz)  Body mass index is 19.74 kg/m².     SpO2: 98 %         Intake/Output Summary (Last 24 hours) at 6/26/2023 1054  Last data filed at 6/26/2023 0415  Gross per 24 hour   Intake 588.83 ml   Output --   Net 588.83 ml       Lines/Drains/Airways       Peripheral Intravenous Line  Duration                  Peripheral IV - Single Lumen 06/22/23 2208 20 G Anterior;Right Forearm 3 days                       Physical Exam  Vitals and nursing note reviewed.   Constitutional:       General: He is not in acute distress.     Appearance: He is well-developed. He is ill-appearing. He is not diaphoretic.   HENT:      Head: Normocephalic and atraumatic.   Eyes:      General:         Right eye: No discharge.         Left eye: No discharge.      Pupils: Pupils are equal, round, and reactive to light.   Neck:      Thyroid: No thyromegaly.      Vascular: No JVD.      Trachea: No tracheal deviation.   Cardiovascular:      Rate and Rhythm: Normal rate and regular rhythm.      Heart sounds: S1 normal and S2 normal. Murmur heard.   Pulmonary:      Effort: Pulmonary effort is normal. No  respiratory distress.      Breath sounds: Normal breath sounds. No wheezing or rales.   Abdominal:      General: There is no distension.      Tenderness: There is no rebound.   Musculoskeletal:      Cervical back: Neck supple.      Right lower leg: No edema.      Left lower leg: No edema.   Skin:     General: Skin is warm and dry.      Findings: No erythema.   Neurological:      Mental Status: He is alert and oriented to person, place, and time.   Psychiatric:         Mood and Affect: Mood normal.         Behavior: Behavior normal.          Significant Labs: CMP   Recent Labs   Lab 06/25/23  0447 06/26/23  0459   * 135*   K 3.6 3.5    102   CO2 21* 23    99   BUN 42* 37*   CREATININE 1.7* 1.6*   CALCIUM 8.5* 8.5*   ANIONGAP 11 10   , CBC   Recent Labs   Lab 06/25/23  0753 06/26/23  0459   WBC 16.61* 13.36*   HGB 8.9* 7.9*   HCT 26.6* 23.5*    127*   , Troponin No results for input(s): TROPONINI in the last 48 hours., and All pertinent lab results from the last 24 hours have been reviewed.    Significant Imaging: Echocardiogram: Transthoracic echo (TTE) complete (Cupid Only):   Results for orders placed or performed during the hospital encounter of 06/22/23   Echo   Result Value Ref Range    BSA 1.86 m2    TDI SEPTAL 0.07 m/s    LV LATERAL E/E' RATIO 13.80 m/s    LV SEPTAL E/E' RATIO 19.71 m/s    LA WIDTH 3.80 cm    IVC diameter 2.16 cm    Left Ventricular Outflow Tract Mean Velocity 1.40 cm/s    Left Ventricular Outflow Tract Mean Gradient 9.33 mmHg    TV mean gradient 21 mmHg    TDI LATERAL 0.10 m/s    LVIDd 4.53 3.5 - 6.0 cm    IVS 1.49 (A) 0.6 - 1.1 cm    Posterior Wall 1.46 (A) 0.6 - 1.1 cm    Ao root annulus 2.24 cm    LVIDs 2.80 2.1 - 4.0 cm    FS 38 28 - 44 %    LA volume 66.25 cm3    STJ 2.37 cm    Ascending aorta 2.41 cm    LV mass 271.46 g    LA size 4.32 cm    TAPSE 1.80 cm    Left Ventricle Relative Wall Thickness 0.64 cm    AV mean gradient 24 mmHg    AV valve area 1.76 cm2     AV Velocity Ratio 0.63     AV index (prosthetic) 0.64     MV mean gradient 4 mmHg    MV valve area p 1/2 method 2.58 cm2    MV valve area by continuity eq 2.84 cm2    E/A ratio 1.07     Mean e' 0.09 m/s    E wave deceleration time 293.64 msec    IVRT 60.89 msec    LVOT diameter 1.88 cm    LVOT area 2.8 cm2    LVOT peak hira 1.87 m/s    LVOT peak VTI 45.90 cm    Ao peak hira 2.95 m/s    Ao VTI 72.2 cm    RVOT peak hira 0.84 m/s    RVOT peak VTI 17.1 cm    Mr max hira 5.06 m/s    LVOT stroke volume 127.35 cm3    AV peak gradient 35 mmHg    MV peak gradient 9 mmHg    PV mean gradient 1.71 mmHg    E/E' ratio 16.24 m/s    MV Peak E Hira 1.38 m/s    TR Max Hira 2.87 m/s    MV VTI 44.8 cm    MV stenosis pressure 1/2 time 85.16 ms    MV Peak A Hira 1.29 m/s    LV Systolic Volume 29.50 mL    LV Systolic Volume Index 15.8 mL/m2    LV Diastolic Volume 94.13 mL    LV Diastolic Volume Index 50.34 mL/m2    LA Volume Index 35.4 mL/m2    LV Mass Index 145 g/m2    RA Major Axis 3.68 cm    Left Atrium Minor Axis 4.85 cm    Left Atrium Major Axis 4.65 cm    Triscuspid Valve Regurgitation Peak Gradient 33 mmHg    RA Width 2.20 cm    Right Atrial Pressure (from IVC) 8 mmHg    EF 60 %    TV rest pulmonary artery pressure 41 mmHg    Narrative    · The left ventricle is normal in size with severe concentric hypertrophy   and normal systolic function.  · Mild left atrial enlargement.  · Grade II left ventricular diastolic dysfunction.  · The estimated PA systolic pressure is 41 mmHg.  · Normal right ventricular size with normal right ventricular systolic   function.  · Intermediate central venous pressure (8 mmHg).  · The estimated ejection fraction is 60%.  · There is a bioprosthetic aortic valve present. There is no aortic   insufficiency present. Prosthetic aortic valve is normal.  · The aortic valve mean gradient is 24 mmHg with a dimensionless index of   0.64.  · Mild mitral regurgitation.  · Mild tricuspid regurgitation.  · There is  pulmonary hypertension.      , EKG: Reviewed, and X-Ray: CXR: X-Ray Chest 1 View (CXR): No results found for this visit on 06/22/23. and X-Ray Chest PA and Lateral (CXR): No results found for this visit on 06/22/23.

## 2023-06-26 NOTE — ASSESSMENT & PLAN NOTE
-Presents with acid reflux type symptoms over the past few days  -Troponin 0.623>1.671>3.887, repeat pending  -Echo pending  -Continue ASA, statin, heparin gtt  -BP soft, will maximize regimen as tolerated  -Discussed invasive mgmt with [facility] once renal function stabilizes/improves, family/pt unsure about proceeding at present time. Continue OMT in interim.      6.24.2023  Asa, plavix and ppi on discharge  See HPI   Conservative management   Treated for bacteremia by hospital medicine , hx of valve, may need STARLA , will await for final cultures , discussed with      6/26/23  -Med mgmt  -Continue ASA, Plavix as H/H permits  -Resume statin as tolerated  -BP too marginal for BB, etc

## 2023-06-26 NOTE — PLAN OF CARE
[facility] - Tele[facility] (Hospital)  Initial Discharge Assessment       Primary Care Provider: Devyn Beck MD    Admission Diagnosis: Elevated troponin [R77.8]  NSTEMI (non-ST elevated myocardial infarction) [I21.4]  Chest pain [R07.9]  Dementia, unspecified dementia severity, unspecified dementia type, unspecified whether behavioral, psychotic, or mood disturbance or anxiety [F03.90]    Admission Date: 6/22/2023  Expected Discharge Date: 6/24/2023    Transition of Care Barriers: None    Payor: [email or web address] MEDICARE / Plan: [employer] MEDICARE HMO / Product Type: Capitation /     Extended Emergency Contact Information  Primary Emergency Contact: Elizabeth Walker  Mobile Phone: [phone]  Relation: Daughter  Preferred language: English   needed? No    Discharge Plan A: Home with family, Home Health  Discharge Plan B: Home Health      [facility] [address] [place], [place] - [address]  [address] [facility] [address]  Phone: [phone] Fax: [phone]    [facility] [place], [place] - [address]   [address] [facility] [address]  Phone: [phone] Fax: [phone]      Initial Assessment (most recent)       Adult Discharge Assessment - 06/26/23 1145          Discharge Assessment    Assessment Type Discharge Planning Assessment     Confirmed/corrected address, phone number and insurance Yes     Confirmed Demographics Correct on Facesheet     Source of Information patient;family     Communicated JIMBO with patient/caregiver Date not available/Unable to determine     Reason For Admission Bacteremia     People in Home child(cammy), adult     Facility Arrived From: Home     Do you expect to return to your current living situation? Yes     Do you have help at home or someone to help you manage your care at home? Yes     Who are your caregiver(s) and their phone number(s)? Pt's daughterElizabeth     Prior to hospitilization cognitive status: Alert/Oriented      Current cognitive status: Alert/Oriented     Equipment Currently Used at Home none     Readmission within 30 days? No     Patient currently being followed by outpatient case management? No     Do you currently have service(s) that help you manage your care at home? No     Do you take prescription medications? Yes     Do you have prescription coverage? Yes     Do you have any problems affording any of your prescribed medications? No     Is the patient taking medications as prescribed? yes     Who is going to help you get home at discharge? Pt's family will coordinate transportation     How do you get to doctors appointments? family or friend will provide     Are you on dialysis? No     Do you take coumadin? No     Discharge Plan A Home with family;Home Health     Discharge Plan B Home Health     DME Needed Upon Discharge  none     Discharge Plan discussed with: Patient     Transition of Care Barriers None                   SW met with patient, daughter, Elizabeth, and another relative at bedside for assessment. Pt lives at home with Elizabeth. Elizabeth states she's pt's help at home. Pt independent with ambulation and does not have any DME. Family will provide transportation.     Elizabeth noted preference for [facility], if service recommended for discharge.    Pt's whiteboard updated to provide CM contact information. SW to remain available.

## 2023-06-26 NOTE — ASSESSMENT & PLAN NOTE
6/25/2023   Awaiting cultures result   Pending ID input   Discussed with family concern for bacteremia in the presence of a prosthetic valve   Pending ID input   Possible need for STARLA if patient can tolerate     6/26/23  -STARLA requested by ID  -Keep NPO after MN

## 2023-06-26 NOTE — ASSESSMENT & PLAN NOTE
Source control is needed in all cases of strep bacteremia-  With prosthetic valve, will need STARLA  Continue ROCEPHIN

## 2023-06-26 NOTE — SUBJECTIVE & OBJECTIVE
Interval History: No issues overnight. Tolerating diet.     Review of Systems   Constitutional:  Negative for fatigue and fever.   HENT:  Negative for sinus pressure.    Eyes:  Negative for visual disturbance.   Respiratory:  Negative for shortness of breath.    Cardiovascular:  Negative for chest pain.   Gastrointestinal:  Negative for nausea and vomiting.   Genitourinary:  Negative for difficulty urinating.   Musculoskeletal:  Negative for back pain.   Skin:  Negative for rash.   Neurological:  Negative for headaches.   Psychiatric/Behavioral:  Negative for confusion.    Objective:     Vital Signs (Most Recent):  Temp: 98.1 °F (36.7 °C) (06/26/23 0700)  Pulse: 66 (06/26/23 0734)  Resp: 18 (06/26/23 0734)  BP: (!) 100/57 (06/26/23 0700)  SpO2: 98 % (06/26/23 0734) Vital Signs (24h Range):  Temp:  [97.9 °F (36.6 °C)-99.1 °F (37.3 °C)] 98.1 °F (36.7 °C)  Pulse:  [63-92] 66  Resp:  [17-18] 18  SpO2:  [93 %-99 %] 98 %  BP: ()/(52-66) 100/57     Weight: 62.4 kg (137 lb 9.1 oz)  Body mass index is 19.74 kg/m².    Intake/Output Summary (Last 24 hours) at 6/26/2023 0951  Last data filed at 6/26/2023 0415  Gross per 24 hour   Intake 588.83 ml   Output --   Net 588.83 ml         Physical Exam  Constitutional:       General: He is not in acute distress.     Appearance: He is well-developed. He is not diaphoretic.   HENT:      Head: Normocephalic and atraumatic.   Eyes:      Pupils: Pupils are equal, round, and reactive to light.   Cardiovascular:      Rate and Rhythm: Normal rate and regular rhythm.      Heart sounds: Normal heart sounds. No murmur heard.    No friction rub. No gallop.   Pulmonary:      Effort: Pulmonary effort is normal. No respiratory distress.      Breath sounds: Normal breath sounds. No stridor. No wheezing or rales.   Abdominal:      General: Bowel sounds are normal. There is no distension.      Palpations: Abdomen is soft. There is no mass.      Tenderness: There is no abdominal tenderness. There  is no guarding.   Skin:     General: Skin is warm.      Findings: No erythema.   Neurological:      Mental Status: He is alert and oriented to person, place, and time.           Significant Labs: All pertinent labs within the past 24 hours have been reviewed.    Significant Imaging: I have reviewed all pertinent imaging results/findings within the past 24 hours.

## 2023-06-26 NOTE — CONSULTS
[facility])  Infectious Disease  Consult Note    Patient Name: Juan Castellanos  MRN: [id number]  Admission Date: 6/22/2023  Hospital Length of Stay: 1 days  Attending Physician: Cooper Zayas MD  Primary Care Provider: Devyn Beck MD     Isolation Status: No active isolations    Patient information was obtained from patient, past medical records and ER records.      Consults  Assessment/Plan:     Cardiac/Vascular  NSTEMI (non-ST elevated myocardial infarction)  Cardiology follow up    Primary hypertension  BP control as per primary team    S/P AVR (aortic valve replacement)  With bacteremia- will need STARLA to rule out endocarditis-  continue Rocephin    ID  * Bacteremia  Source control is needed in all cases of strep bacteremia-  With prosthetic valve, will need STARLA  Continue ROCEPHIN        Thank you for your consult. I will follow-up with patient. Please contact us if you have any additional questions.    Mathew Izquierdo MD, [facility]  Infectious Disease  [facility])    Subjective:     Principal Problem: Bacteremia    HPI:   89 y.o. male with a PMH  has a past medical history of Allergy, Anxiety, Arthritis, Blood transfusion, Cataract, Depression, GERD (gastroesophageal reflux disease), Heart murmur, s/p AVR Hypercholesterolemia admitted with  epigastric/right upper quadrant abdominal pain .  Since admission-blood cultures done 06/22-    STREPTOCOCCUS ANGINOSUS      Echo showed-There is a bioprosthetic aortic valve present. There is no aortic insufficiency present. Prosthetic aortic valve is normal.   The aortic valve mean gradient is 24 mmHg with a dimensionless index of 0.64  '      Past Medical History:   Diagnosis Date    Allergy     Anxiety     Arthritis     Blood transfusion     Cataract     Depression     GERD (gastroesophageal reflux disease)     Heart murmur     Hypercholesterolemia     Primary hypertension 11/24/2021    Prostate cancer     Thyroid disease         Past Surgical History:   Procedure Laterality Date    ABDOMINAL HERNIA REPAIR Right     AORTIC VALVE REPLACEMENT      BASAL CELL CARCINOMA EXCISION Right 2022    CARDIAC VALVE SURGERY      PROSTATECTOMY         Review of patient's allergies indicates:   Allergen Reactions    Tomato (solanum lycopersicum)     Grape Rash    Orange Rash       Medications:  Medications Prior to Admission   Medication Sig    aspirin (ECOTRIN) 81 MG EC tablet Take 81 mg by mouth once daily.    cholecalciferol, vitamin D3, 1,000 unit capsule Take 1,000 Units by mouth 2 (two) times daily.    ferrous gluconate (FERGON) 324 MG tablet Take 324 mg by mouth daily with breakfast.    levothyroxine (SYNTHROID) 112 MCG tablet Take 1 tablet by mouth every morning.    MULTIVIT-IRON-MIN-FOLIC ACID 3,500-18-0.4 UNIT-MG-MG ORAL CHEW Take 1 tablet by mouth once daily.    rosuvastatin (CRESTOR) 20 MG tablet Take 20 mg by mouth every evening.    [DISCONTINUED] losartan (COZAAR) 25 MG tablet Take 25 mg by mouth once daily.    fluticasone (FLONASE) 50 mcg/actuation nasal spray 1 spray by Each Nare route once daily.    hydrocortisone 2.5 % cream Apply topically 2 (two) times daily.    tramadol (ULTRAM) 50 mg tablet Take 50 mg by mouth every 6 (six) hours as needed.    [DISCONTINUED] hydroCHLOROthiazide (MICROZIDE) 12.5 mg capsule Take 12.5 mg by mouth once daily.     Antibiotics (From admission, onward)      Start     Stop Route Frequency Ordered    06/24/23 1415  cefTRIAXone (ROCEPHIN) 2 g in dextrose 5 % in water (D5W) 5 % 100 mL IVPB (MB+)         -- IV Every 24 hours (non-standard times) 06/24/23 1314          Antifungals (From admission, onward)      None          Antivirals (From admission, onward)      None             Immunization History   Administered Date(s) Administered    COVID-19, MRNA, LN-S, PF (MODERNA FULL 0.5 ML DOSE) 10/27/2021, 07/28/2022       Family History       Problem Relation (Age of Onset)    Cataracts  Father    Diabetes Brother          Social History     Socioeconomic History    Marital status:    Tobacco Use    Smoking status: Former    Smokeless tobacco: Never    Tobacco comments:     quit 40 years ago   Substance and Sexual Activity    Alcohol use: No    Drug use: No    Sexual activity: Not Currently     Review of Systems   Constitutional:  Negative for activity change, appetite change, chills and diaphoresis.   HENT:  Negative for congestion.    Eyes:  Negative for discharge and itching.   Respiratory:  Negative for apnea and chest tightness.    Objective:     Vital Signs (Most Recent):  Temp: 98.1 °F (36.7 °C) (06/26/23 0700)  Pulse: 66 (06/26/23 0734)  Resp: 18 (06/26/23 0734)  BP: (!) 100/57 (06/26/23 0700)  SpO2: 98 % (06/26/23 0734) Vital Signs (24h Range):  Temp:  [97.9 °F (36.6 °C)-99.1 °F (37.3 °C)] 98.1 °F (36.7 °C)  Pulse:  [63-92] 66  Resp:  [17-18] 18  SpO2:  [93 %-99 %] 98 %  BP: ()/(52-66) 100/57     Weight: 62.4 kg (137 lb 9.1 oz)  Body mass index is 19.74 kg/m².    Estimated Creatinine Clearance: 27.1 mL/min (A) (based on SCr of 1.6 mg/dL (H)).     Physical Exam  Vitals and nursing note reviewed.   HENT:      Head: Normocephalic.   Cardiovascular:      Rate and Rhythm: Normal rate.   Pulmonary:      Effort: No respiratory distress.   Abdominal:      General: Abdomen is flat.   Musculoskeletal:      Cervical back: Normal range of motion.   Neurological:      Mental Status: He is alert.        Significant Labs: Blood Culture:   Recent Labs   Lab 06/22/23  2250 06/22/23  2301 06/25/23  1221   LABBLOO Gram stain aer bottle: Gram positive cocci in chains resembling Strep  Gram stain kaila bottle: Gram positive cocci in chains resembling Strep  Results called to and read back by: Elvira Alicea  06/24/2023  13:10  STREPTOCOCCUS ANGINOSUS  For susceptibility see order #[id number]  * Gram stain kaila bottle: Gram positive cocci in chains resembling Strep  Results called to and  read back by: Elvira Alicea  06/24/2023  13:10  STREPTOCOCCUS ANGINOSUS  Susceptibility pending  * No Growth to date  No Growth to date     CBC:   Recent Labs   Lab 06/25/23  0753 06/26/23  0459   WBC 16.61* 13.36*   HGB 8.9* 7.9*   HCT 26.6* 23.5*    127*     CMP:   Recent Labs   Lab 06/25/23  0447 06/26/23  0459   * 135*   K 3.6 3.5    102   CO2 21* 23    99   BUN 42* 37*   CREATININE 1.7* 1.6*   CALCIUM 8.5* 8.5*   ANIONGAP 11 10       Significant Imaging: I have reviewed all pertinent imaging results/findings within the past 24 hours.

## 2023-06-26 NOTE — PROGRESS NOTES
"[facility] - Telemetry ([facility])  Cardiology  Progress Note    Patient Name: Juan Castellanos  MRN: [id number]  Admission Date: 6/22/2023  Hospital Length of Stay: 1 days  Code Status: DNR   Attending Physician: Cooper Zayas MD   Primary Care Physician: Devyn Beck MD  Expected Discharge Date: 6/24/2023  Principal Problem:Bacteremia    Subjective:     Hospital Course:   Mr. Castellanos is a 89 year old male patient whose current medical conditions include AS s/p AVR, prostate cancer, thyroid disease, HTN, hyperlipidemia, and CAD who presented to [facility] ED thsi AM with a chief complaint of left-sided/generalized abdominal discomfort that onset yesterday evening. Associated symptoms included indigestion like discomfort and decreased po intake. Patient denied any associated palpitations, LH, dizziness, fever, chills, nausea, vomiting, or diaphoresis. Took F[facility] to try to relieve symptoms. Initial workup in ED revealed MAGO (creatinine of 2.0), troponin of 0.623, and BNP of 254 and patient was subsequently admitted for further evaluation and treatment. Cardiology consulted to assist with management. Patient seen and examined today, resting in bed with family in room. Feels ok. Denies any overt chest pain or tightness but family does report he has been complaining of "acid reflux" symptoms over the past few days. Patient is compliant with his medications. Followed on OP basis by Dr. Bueno. Troponin 0.623>1.671>3.887. H/H 9.5/28.5, recent FOBT negative. Echo pending. EKG reviewed, no acute ischemic changes appreciated. CT of abdomen with NAF.       6.24.2023  Trop trended down   Discussed with family ,   Bacteremic   Chest pain free , dementia   Daughter and family wants conservative management     6/25/2023  Looks ill   Daughter and son in law in room   Denies chest pain   Updated about bactermia , possible need for yandel, family is agreeable if needed, patient looking very fatigued     6/26/23-Patient seen and examined " today with daughter at bedside. Feels ok. Tired/weak. BP borderline. Repeat BC pending. Creatinine 1.6. H/H 7.9/23.5. STARLA requested by ID.        Review of Systems   Constitutional: Positive for malaise/fatigue.   HENT: Negative.     Eyes: Negative.    Cardiovascular: Negative.    Respiratory: Negative.     Endocrine: Negative.    Hematologic/Lymphatic: Negative.    Skin: Negative.    Musculoskeletal: Negative.    Gastrointestinal: Negative.    Genitourinary: Negative.    Neurological: Negative.    Psychiatric/Behavioral:  Positive for memory loss.    Allergic/Immunologic: Negative.    Objective:     Vital Signs (Most Recent):  Temp: 98.1 °F (36.7 °C) (06/26/23 0700)  Pulse: 66 (06/26/23 0900)  Resp: 18 (06/26/23 0734)  BP: (!) 100/57 (06/26/23 0700)  SpO2: 98 % (06/26/23 0734) Vital Signs (24h Range):  Temp:  [97.9 °F (36.6 °C)-99.1 °F (37.3 °C)] 98.1 °F (36.7 °C)  Pulse:  [63-92] 66  Resp:  [17-18] 18  SpO2:  [93 %-99 %] 98 %  BP: ()/(52-66) 100/57     Weight: 62.4 kg (137 lb 9.1 oz)  Body mass index is 19.74 kg/m².     SpO2: 98 %         Intake/Output Summary (Last 24 hours) at 6/26/2023 1054  Last data filed at 6/26/2023 0415  Gross per 24 hour   Intake 588.83 ml   Output --   Net 588.83 ml       Lines/Drains/Airways       Peripheral Intravenous Line  Duration                  Peripheral IV - Single Lumen 06/22/23 2208 20 G Anterior;Right Forearm 3 days                       Physical Exam  Vitals and nursing note reviewed.   Constitutional:       General: He is not in acute distress.     Appearance: He is well-developed. He is ill-appearing. He is not diaphoretic.   HENT:      Head: Normocephalic and atraumatic.   Eyes:      General:         Right eye: No discharge.         Left eye: No discharge.      Pupils: Pupils are equal, round, and reactive to light.   Neck:      Thyroid: No thyromegaly.      Vascular: No JVD.      Trachea: No tracheal deviation.   Cardiovascular:      Rate and Rhythm: Normal rate  and regular rhythm.      Heart sounds: S1 normal and S2 normal. Murmur heard.   Pulmonary:      Effort: Pulmonary effort is normal. No respiratory distress.      Breath sounds: Normal breath sounds. No wheezing or rales.   Abdominal:      General: There is no distension.      Tenderness: There is no rebound.   Musculoskeletal:      Cervical back: Neck supple.      Right lower leg: No edema.      Left lower leg: No edema.   Skin:     General: Skin is warm and dry.      Findings: No erythema.   Neurological:      Mental Status: He is alert and oriented to person, place, and time.   Psychiatric:         Mood and Affect: Mood normal.         Behavior: Behavior normal.          Significant Labs: CMP   Recent Labs   Lab 06/25/23  0447 06/26/23  0459   * 135*   K 3.6 3.5    102   CO2 21* 23    99   BUN 42* 37*   CREATININE 1.7* 1.6*   CALCIUM 8.5* 8.5*   ANIONGAP 11 10   , CBC   Recent Labs   Lab 06/25/23  0753 06/26/23  0459   WBC 16.61* 13.36*   HGB 8.9* 7.9*   HCT 26.6* 23.5*    127*   , Troponin No results for input(s): TROPONINI in the last 48 hours., and All pertinent lab results from the last 24 hours have been reviewed.    Significant Imaging: Echocardiogram: Transthoracic echo (TTE) complete (Cupid Only):   Results for orders placed or performed during the hospital encounter of 06/22/23   Echo   Result Value Ref Range    BSA 1.86 m2    TDI SEPTAL 0.07 m/s    LV LATERAL E/E' RATIO 13.80 m/s    LV SEPTAL E/E' RATIO 19.71 m/s    LA WIDTH 3.80 cm    IVC diameter 2.16 cm    Left Ventricular Outflow Tract Mean Velocity 1.40 cm/s    Left Ventricular Outflow Tract Mean Gradient 9.33 mmHg    TV mean gradient 21 mmHg    TDI LATERAL 0.10 m/s    LVIDd 4.53 3.5 - 6.0 cm    IVS 1.49 (A) 0.6 - 1.1 cm    Posterior Wall 1.46 (A) 0.6 - 1.1 cm    Ao root annulus 2.24 cm    LVIDs 2.80 2.1 - 4.0 cm    FS 38 28 - 44 %    LA volume 66.25 cm3    STJ 2.37 cm    Ascending aorta 2.41 cm    LV mass 271.46 g    LA  size 4.32 cm    TAPSE 1.80 cm    Left Ventricle Relative Wall Thickness 0.64 cm    AV mean gradient 24 mmHg    AV valve area 1.76 cm2    AV Velocity Ratio 0.63     AV index (prosthetic) 0.64     MV mean gradient 4 mmHg    MV valve area p 1/2 method 2.58 cm2    MV valve area by continuity eq 2.84 cm2    E/A ratio 1.07     Mean e' 0.09 m/s    E wave deceleration time 293.64 msec    IVRT 60.89 msec    LVOT diameter 1.88 cm    LVOT area 2.8 cm2    LVOT peak hira 1.87 m/s    LVOT peak VTI 45.90 cm    Ao peak hira 2.95 m/s    Ao VTI 72.2 cm    RVOT peak hira 0.84 m/s    RVOT peak VTI 17.1 cm    Mr max hira 5.06 m/s    LVOT stroke volume 127.35 cm3    AV peak gradient 35 mmHg    MV peak gradient 9 mmHg    PV mean gradient 1.71 mmHg    E/E' ratio 16.24 m/s    MV Peak E Hira 1.38 m/s    TR Max Hira 2.87 m/s    MV VTI 44.8 cm    MV stenosis pressure 1/2 time 85.16 ms    MV Peak A Hira 1.29 m/s    LV Systolic Volume 29.50 mL    LV Systolic Volume Index 15.8 mL/m2    LV Diastolic Volume 94.13 mL    LV Diastolic Volume Index 50.34 mL/m2    LA Volume Index 35.4 mL/m2    LV Mass Index 145 g/m2    RA Major Axis 3.68 cm    Left Atrium Minor Axis 4.85 cm    Left Atrium Major Axis 4.65 cm    Triscuspid Valve Regurgitation Peak Gradient 33 mmHg    RA Width 2.20 cm    Right Atrial Pressure (from IVC) 8 mmHg    EF 60 %    TV rest pulmonary artery pressure 41 mmHg    Narrative    · The left ventricle is normal in size with severe concentric hypertrophy   and normal systolic function.  · Mild left atrial enlargement.  · Grade II left ventricular diastolic dysfunction.  · The estimated PA systolic pressure is 41 mmHg.  · Normal right ventricular size with normal right ventricular systolic   function.  · Intermediate central venous pressure (8 mmHg).  · The estimated ejection fraction is 60%.  · There is a bioprosthetic aortic valve present. There is no aortic   insufficiency present. Prosthetic aortic valve is normal.  · The aortic valve mean  gradient is 24 mmHg with a dimensionless index of   0.64.  · Mild mitral regurgitation.  · Mild tricuspid regurgitation.  · There is pulmonary hypertension.      , EKG: Reviewed, and X-Ray: CXR: X-Ray Chest 1 View (CXR): No results found for this visit on 06/22/23. and X-Ray Chest PA and Lateral (CXR): No results found for this visit on 06/22/23.    Assessment and Plan:   Patient who presents with bacteremia/NSTEMI. STARLA requested by ID. Keep NPO after MN.    * Bacteremia  6/25/2023   Awaiting cultures result   Pending ID input   Discussed with family concern for bacteremia in the presence of a prosthetic valve   Pending ID input   Possible need for STARLA if patient can tolerate     6/26/23  -STARLA requested by ID  -Keep NPO after MN    NSTEMI (non-ST elevated myocardial infarction)  -Presents with acid reflux type symptoms over the past few days  -Troponin 0.623>1.671>3.887, repeat pending  -Echo pending  -Continue ASA, statin, heparin gtt  -BP soft, will maximize regimen as tolerated  -Discussed invasive mgmt with [facility] once renal function stabilizes/improves, family/pt unsure about proceeding at present time. Continue OMT in interim.      6.24.2023  Asa, plavix and ppi on discharge  See HPI   Conservative management   Treated for bacteremia by hospital medicine , hx of valve, may need STARLA , will await for final cultures , discussed with      6/26/23  -Med mgmt  -Continue ASA, Plavix as H/H permits  -Resume statin as tolerated  -BP too marginal for BB, etc    MAGO (acute kidney injury)  -Likely in setting of IVVD/decreased appetite/on HCTZ as OP  -Recommend gentle IV hydration    6/26/23  -Creatinine improved since admission    Elevated troponin  -See plan under NSTEMI    Epigastric pain  -Unclear etiology  -CT of abdomen un-revealing  -Lipase normal  -Mgmt as per primary team    Primary hypertension  -BP soft  -Hold ARB/HCTZ given MAGO    Hyperlipidemia  -Statin    S/P AVR (aortic valve replacement)  -Reassess by  echo        VTE Risk Mitigation (From admission, onward)         Ordered     IP VTE HIGH RISK PATIENT  Once         06/23/23 0334     Place sequential compression device  Until discontinued         06/23/23 0334                Renetta Rodriguez PA-C  Cardiology  '[facility] - Telemetry ([facility])

## 2023-06-26 NOTE — HPI
89 y.o. male with a PMH  has a past medical history of Allergy, Anxiety, Arthritis, Blood transfusion, Cataract, Depression, GERD (gastroesophageal reflux disease), Heart murmur, s/p AVR Hypercholesterolemia admitted with  epigastric/right upper quadrant abdominal pain .  Since admission-blood cultures done 06/22-    STREPTOCOCCUS ANGINOSUS     Echo showed-There is a bioprosthetic aortic valve present. There is no aortic insufficiency present. Prosthetic aortic valve is normal.  The aortic valve mean gradient is 24 mmHg with a dimensionless index of 0.64  '

## 2023-06-26 NOTE — ASSESSMENT & PLAN NOTE
Patient with acute kidney injury likely due to IVVD/dehydration and pre-renal azotemia MAGO is currently being treated. Labs reviewed- Renal function/electrolytes with Estimated Creatinine Clearance: 27.1 mL/min (A) (based on SCr of 1.6 mg/dL (H)). according to latest data. Monitor urine output and serial BMP and adjust therapy as needed. Avoid nephrotoxins and renally dose meds for GFR listed above.   -improved after 500 ml bolus given 6/24  -cont to hold HCTZ and losartan  -repeat labs in am    Stable   Cont to monitor

## 2023-06-26 NOTE — PLAN OF CARE
Patient remained free from falls throughout shift, call bell within reach. Robaxin given once. Blood cx pending. Afebrile. Vitals stable, will continue to monitor.

## 2023-06-26 NOTE — PROGRESS NOTES
[facility] Medicine  Progress Note    Patient Name: Juan Castellanos  MRN: [id number]  Patient Class: IP- Inpatient   Admission Date: 6/22/2023  Length of Stay: 1 days  Attending Physician: Cooper Zayas MD  Primary Care Provider: Devyn Beck MD        Subjective:     Principal Problem:Bacteremia        HPI:  Juan Castellanos is a 89 y.o. male with a PMH  has a past medical history of Allergy, Anxiety, Arthritis, Blood transfusion, Cataract, Depression, GERD (gastroesophageal reflux disease), Heart murmur, Hypercholesterolemia, Primary hypertension (11/24/2021), Prostate cancer, and Thyroid disease.  Presented to the ER accompanied by family for evaluation of epigastric/right upper quadrant abdominal pain that radiated to his right lower quadrant/umbilical region.  Patient reports he was sitting down watching that she baseball game and reports that the pain started all of a sudden.  Patient believed it was his acid reflux symptoms.  Patient states he took Rosa-Randolph without any relief of his symptoms.  Only reported cardiac history was aortic valve replacement.  Denies any stents or history of mi/bypass.  Currently asymptomatic and denies any other associated symptoms accompanied with abdominal pain.  ER workup showed BUN/creatinine of 30/2.0 (previously 16/1.3), , troponin of 0.623, and EKG showed sinus tach with rate of 102 QT/QTC of 350/456.  Remainder of workup was unremarkable.  Patient received total of 40 Protonix and GI cocktail in ED.  Hospital Medicine consulted to admit patient for cardiac workup.  Patient family are in agreement with treatment plan.  Patient will be admitted under observation status.  PCP: Devyn Beck        Overview/Hospital Course:  89 y/o male admitted with abdominal pain, LUQ and radiates across the top, exacerbated after he eats, with no improvement after taking rosa-seltzer. Troponin was bumped on admission and continued to trend up. Started  on heparin gtt nomogram. Cr 1.9 (near his baseline of 1.6-1.9). Cardiology consulted for assistance. BC obtained on admission, growing strep, started on Rocephin IV (6/24), repeat BC 6/25, pending.  Echo EF 60%, grade II LV diastolic dysfunction, mild MR, TR with pulmonary hypertension. EKG S tachy.  6/26: cards on case. STARLA tomorrow. Repeat blood cultures NG. Cont rocephin.       Interval History: No issues overnight. Tolerating diet.     Review of Systems   Constitutional:  Negative for fatigue and fever.   HENT:  Negative for sinus pressure.    Eyes:  Negative for visual disturbance.   Respiratory:  Negative for shortness of breath.    Cardiovascular:  Negative for chest pain.   Gastrointestinal:  Negative for nausea and vomiting.   Genitourinary:  Negative for difficulty urinating.   Musculoskeletal:  Negative for back pain.   Skin:  Negative for rash.   Neurological:  Negative for headaches.   Psychiatric/Behavioral:  Negative for confusion.    Objective:     Vital Signs (Most Recent):  Temp: 98.1 °F (36.7 °C) (06/26/23 0700)  Pulse: 66 (06/26/23 0734)  Resp: 18 (06/26/23 0734)  BP: (!) 100/57 (06/26/23 0700)  SpO2: 98 % (06/26/23 0734) Vital Signs (24h Range):  Temp:  [97.9 °F (36.6 °C)-99.1 °F (37.3 °C)] 98.1 °F (36.7 °C)  Pulse:  [63-92] 66  Resp:  [17-18] 18  SpO2:  [93 %-99 %] 98 %  BP: ()/(52-66) 100/57     Weight: 62.4 kg (137 lb 9.1 oz)  Body mass index is 19.74 kg/m².    Intake/Output Summary (Last 24 hours) at 6/26/2023 0945  Last data filed at 6/26/2023 0415  Gross per 24 hour   Intake 588.83 ml   Output --   Net 588.83 ml         Physical Exam  Constitutional:       General: He is not in acute distress.     Appearance: He is well-developed. He is not diaphoretic.   HENT:      Head: Normocephalic and atraumatic.   Eyes:      Pupils: Pupils are equal, round, and reactive to light.   Cardiovascular:      Rate and Rhythm: Normal rate and regular rhythm.      Heart sounds: Normal heart sounds. No  murmur heard.    No friction rub. No gallop.   Pulmonary:      Effort: Pulmonary effort is normal. No respiratory distress.      Breath sounds: Normal breath sounds. No stridor. No wheezing or rales.   Abdominal:      General: Bowel sounds are normal. There is no distension.      Palpations: Abdomen is soft. There is no mass.      Tenderness: There is no abdominal tenderness. There is no guarding.   Skin:     General: Skin is warm.      Findings: No erythema.   Neurological:      Mental Status: He is alert and oriented to person, place, and time.           Significant Labs: All pertinent labs within the past 24 hours have been reviewed.    Significant Imaging: I have reviewed all pertinent imaging results/findings within the past 24 hours.      Assessment/Plan:      * Bacteremia  -BC growing strep, final pending  -repeat BC today  -cont Rocephin IV  -UA and CXR negative, afebrile, unclear etiology?  -consult ID for assistance, patient has h/o artifical heart valve, may need STARLA?    Strep bacteremia noted  No source currently   Cards on case   STARLA tomorrow  Cont rocephin    NSTEMI (non-ST elevated myocardial infarction)  -tele monitoring  -troponin trend 0.623 > 1.671 > 3.887 > 5.956 > 5.020  -cont ASA/Plavix (heparin gtt stopped 6/24)  -cardiology consulted, offered LHC and family unsure if they want to proceed, and cards is hesitant with advanced age and MAGO    GERD (gastroesophageal reflux disease)  -Continue home PPI/Antacids as needed     MAGO (acute kidney injury)  Patient with acute kidney injury likely due to IVVD/dehydration and pre-renal azotemia MAGO is currently being treated. Labs reviewed- Renal function/electrolytes with Estimated Creatinine Clearance: 27.1 mL/min (A) (based on SCr of 1.6 mg/dL (H)). according to latest data. Monitor urine output and serial BMP and adjust therapy as needed. Avoid nephrotoxins and renally dose meds for GFR listed above.   -improved after 500 ml bolus given 6/24  -cont to  hold HCTZ and losartan  -repeat labs in am    Stable   Cont to monitor     Elevated troponin  -see above plan of care    Epigastric pain  CT abdomen pelvis without acute findings.  Asymptomatic at this time.  -analgesics prn  -continue home PPI/antiacids  -denies any further pain    Primary hypertension  Chronic, controlled  -Latest blood pressure and vitals reviewed-   Temp:  [97.4 °F (36.3 °C)-98.5 °F (36.9 °C)]   Pulse:  [64-87]   Resp:  [14-18]   BP: (102-131)/(56-71)   SpO2:  [94 %-100 %] .   -Home meds for hypertension were reviewed and noted below.   Hypertension Medications         -While in the hospital, will manage blood pressure as follows; Adjust home antihypertensive regimen as follows- hold HCTZ and losartan d/t hypotension and MAGO  -PRN anti-hypertensive medication if patient's BP > 160/100   -optimize pain management    Hypothyroidism  Patient has chronic hypothyroidism. TFTs reviewed-   Lab Results   Component Value Date    TSH 4.0 10/11/2007   -Will continue chronic levothyroxine and adjust for and clinical changes    Hyperlipidemia  Patient is chronically on statin.will continue for now. Last Lipid Panel:   Lab Results   Component Value Date    CHOL 157 06/05/2019    HDL 45 06/05/2019    LDLCALC 69.2 06/05/2019    TRIG 214 (H) 06/05/2019    CHOLHDL 28.7 06/05/2019       S/P AVR (aortic valve replacement)  -Echo showed patent valve  -follows Dr. Crane OP, will need follow up   -ID consulted with bacteremia and h/o heart valve, may need STARLA      VTE Risk Mitigation (From admission, onward)         Ordered     IP VTE HIGH RISK PATIENT  Once         06/23/23 0334     Place sequential compression device  Until discontinued         06/23/23 0334                Discharge Planning   JIMBO: 6/24/2023     Code Status: DNR   Is the patient medically ready for discharge?: No    Reason for patient still in hospital (select all that apply): Patient trending condition                     Cooper Zayas MD  Department of  [facility] Medicine   [facility] - Telemetry ([facility]

## 2023-06-26 NOTE — ASSESSMENT & PLAN NOTE
-Likely in setting of IVVD/decreased appetite/on HCTZ as OP  -Recommend gentle IV hydration    6/26/23  -Creatinine improved since admission

## 2023-06-26 NOTE — SUBJECTIVE & OBJECTIVE
Past Medical History:   Diagnosis Date    Allergy     Anxiety     Arthritis     Blood transfusion     Cataract     Depression     GERD (gastroesophageal reflux disease)     Heart murmur     Hypercholesterolemia     Primary hypertension 11/24/2021    Prostate cancer     Thyroid disease        Past Surgical History:   Procedure Laterality Date    ABDOMINAL HERNIA REPAIR Right     AORTIC VALVE REPLACEMENT      BASAL CELL CARCINOMA EXCISION Right 2022    CARDIAC VALVE SURGERY      PROSTATECTOMY         Review of patient's allergies indicates:   Allergen Reactions    Tomato (solanum lycopersicum)     Grape Rash    Orange Rash       Medications:  Medications Prior to Admission   Medication Sig    aspirin (ECOTRIN) 81 MG EC tablet Take 81 mg by mouth once daily.    cholecalciferol, vitamin D3, 1,000 unit capsule Take 1,000 Units by mouth 2 (two) times daily.    ferrous gluconate (FERGON) 324 MG tablet Take 324 mg by mouth daily with breakfast.    levothyroxine (SYNTHROID) 112 MCG tablet Take 1 tablet by mouth every morning.    MULTIVIT-IRON-MIN-FOLIC ACID 3,500-18-0.4 UNIT-MG-MG ORAL CHEW Take 1 tablet by mouth once daily.    rosuvastatin (CRESTOR) 20 MG tablet Take 20 mg by mouth every evening.    [DISCONTINUED] losartan (COZAAR) 25 MG tablet Take 25 mg by mouth once daily.    fluticasone (FLONASE) 50 mcg/actuation nasal spray 1 spray by Each Nare route once daily.    hydrocortisone 2.5 % cream Apply topically 2 (two) times daily.    tramadol (ULTRAM) 50 mg tablet Take 50 mg by mouth every 6 (six) hours as needed.    [DISCONTINUED] hydroCHLOROthiazide (MICROZIDE) 12.5 mg capsule Take 12.5 mg by mouth once daily.     Antibiotics (From admission, onward)      Start     Stop Route Frequency Ordered    06/24/23 1415  cefTRIAXone (ROCEPHIN) 2 g in dextrose 5 % in water (D5W) 5 % 100 mL IVPB (MB+)         -- IV Every 24 hours (non-standard times) 06/24/23 1314          Antifungals (From admission, onward)      None           Antivirals (From admission, onward)      None             Immunization History   Administered Date(s) Administered    COVID-19, MRNA, LN-S, PF (MODERNA FULL 0.5 ML DOSE) 10/27/2021, 07/28/2022       Family History       Problem Relation (Age of Onset)    Cataracts Father    Diabetes Brother          Social History     Socioeconomic History    Marital status:    Tobacco Use    Smoking status: Former    Smokeless tobacco: Never    Tobacco comments:     quit 40 years ago   Substance and Sexual Activity    Alcohol use: No    Drug use: No    Sexual activity: Not Currently     Review of Systems   Constitutional:  Negative for activity change, appetite change, chills and diaphoresis.   HENT:  Negative for congestion.    Eyes:  Negative for discharge and itching.   Respiratory:  Negative for apnea and chest tightness.    Objective:     Vital Signs (Most Recent):  Temp: 98.1 °F (36.7 °C) (06/26/23 0700)  Pulse: 66 (06/26/23 0734)  Resp: 18 (06/26/23 0734)  BP: (!) 100/57 (06/26/23 0700)  SpO2: 98 % (06/26/23 0734) Vital Signs (24h Range):  Temp:  [97.9 °F (36.6 °C)-99.1 °F (37.3 °C)] 98.1 °F (36.7 °C)  Pulse:  [63-92] 66  Resp:  [17-18] 18  SpO2:  [93 %-99 %] 98 %  BP: ()/(52-66) 100/57     Weight: 62.4 kg (137 lb 9.1 oz)  Body mass index is 19.74 kg/m².    Estimated Creatinine Clearance: 27.1 mL/min (A) (based on SCr of 1.6 mg/dL (H)).     Physical Exam  Vitals and nursing note reviewed.   HENT:      Head: Normocephalic.   Cardiovascular:      Rate and Rhythm: Normal rate.   Pulmonary:      Effort: No respiratory distress.   Abdominal:      General: Abdomen is flat.   Musculoskeletal:      Cervical back: Normal range of motion.   Neurological:      Mental Status: He is alert.        Significant Labs: Blood Culture:   Recent Labs   Lab 06/22/23  2250 06/22/23  2301 06/25/23  1221   LABBLOO Gram stain aer bottle: Gram positive cocci in chains resembling Strep  Gram stain kaila bottle: Gram positive cocci in  chains resembling Strep  Results called to and read back by: Elvira Alicea  06/24/2023  13:10  STREPTOCOCCUS ANGINOSUS  For susceptibility see order #[id number]  * Gram stain kaila bottle: Gram positive cocci in chains resembling Strep  Results called to and read back by: Elvira Alicea  06/24/2023  13:10  STREPTOCOCCUS ANGINOSUS  Susceptibility pending  * No Growth to date  No Growth to date     CBC:   Recent Labs   Lab 06/25/23  0753 06/26/23  0459   WBC 16.61* 13.36*   HGB 8.9* 7.9*   HCT 26.6* 23.5*    127*     CMP:   Recent Labs   Lab 06/25/23  0447 06/26/23  0459   * 135*   K 3.6 3.5    102   CO2 21* 23    99   BUN 42* 37*   CREATININE 1.7* 1.6*   CALCIUM 8.5* 8.5*   ANIONGAP 11 10       Significant Imaging: I have reviewed all pertinent imaging results/findings within the past 24 hours.

## 2023-06-26 NOTE — PLAN OF CARE
Pt pleasant and cooperative, occasional confusion, able to answer orientation questions, has STARLA planned for tomorrow, he will be npo pm, abxs given, daughter at bedside, pt feels weak and needs assistance X1 with activity. New order of tramadol for back pain, one dose given this shift. Continue to monitor.  Problem: Adult Inpatient Plan of Care  Goal: Plan of Care Review  Outcome: Ongoing, Progressing  Goal: Patient-Specific Goal (Individualized)  Outcome: Ongoing, Progressing  Goal: Absence of Hospital-Acquired Illness or Injury  Outcome: Ongoing, Progressing  Goal: Optimal Comfort and Wellbeing  Outcome: Ongoing, Progressing  Goal: Readiness for Transition of Care  Outcome: Ongoing, Progressing     Problem: Fluid and Electrolyte Imbalance (Acute Kidney Injury/Impairment)  Goal: Fluid and Electrolyte Balance  Outcome: Ongoing, Progressing     Problem: Oral Intake Inadequate (Acute Kidney Injury/Impairment)  Goal: Optimal Nutrition Intake  Outcome: Ongoing, Progressing     Problem: Renal Function Impairment (Acute Kidney Injury/Impairment)  Goal: Effective Renal Function  Outcome: Ongoing, Progressing     Problem: Pain Acute  Goal: Acceptable Pain Control and Functional Ability  Outcome: Ongoing, Progressing     Problem: Fall Injury Risk  Goal: Absence of Fall and Fall-Related Injury  Outcome: Ongoing, Progressing

## 2023-06-26 NOTE — ASSESSMENT & PLAN NOTE
-BC growing strep, final pending  -repeat BC today  -cont Rocephin IV  -UA and CXR negative, afebrile, unclear etiology?  -consult ID for assistance, patient has h/o artifical heart valve, may need STARLA?    Strep bacteremia noted  No source currently   Cards on case   STARLA tomorrow  Cont rocephin

## 2023-06-27 ENCOUNTER — ANESTHESIA EVENT (OUTPATIENT)
Dept: CARDIOLOGY | Facility: HOSPITAL | Age: 88
DRG: 281 | End: 2023-06-27
Payer: MEDICARE

## 2023-06-27 ENCOUNTER — ANESTHESIA (OUTPATIENT)
Dept: CARDIOLOGY | Facility: HOSPITAL | Age: 88
DRG: 281 | End: 2023-06-27
Payer: MEDICARE

## 2023-06-27 PROBLEM — I33.0 STREPTOCOCCAL ENDOCARDITIS: Status: ACTIVE | Noted: 2023-06-24

## 2023-06-27 PROBLEM — R79.89 ELEVATED TROPONIN: Status: RESOLVED | Noted: 2023-06-23 | Resolved: 2023-06-27

## 2023-06-27 PROBLEM — B95.5 STREPTOCOCCAL ENDOCARDITIS: Status: ACTIVE | Noted: 2023-06-24

## 2023-06-27 LAB
ANION GAP SERPL CALC-SCNC: 13 MMOL/L (ref 8–16)
BSA FOR ECHO PROCEDURE: 1.75 M2
BUN SERPL-MCNC: 34 MG/DL (ref 8–23)
CALCIUM SERPL-MCNC: 9.1 MG/DL (ref 8.7–10.5)
CHLORIDE SERPL-SCNC: 100 MMOL/L (ref 95–110)
CO2 SERPL-SCNC: 22 MMOL/L (ref 23–29)
CREAT SERPL-MCNC: 1.5 MG/DL (ref 0.5–1.4)
EJECTION FRACTION: 55 %
EST. GFR  (NO RACE VARIABLE): 44 ML/MIN/1.73 M^2
GLUCOSE SERPL-MCNC: 86 MG/DL (ref 70–110)
POTASSIUM SERPL-SCNC: 3.6 MMOL/L (ref 3.5–5.1)
PROX AORTA: 2.8 CM
SODIUM SERPL-SCNC: 135 MMOL/L (ref 136–145)

## 2023-06-27 PROCEDURE — C1751 CATH, INF, PER/CENT/MIDLINE: HCPCS

## 2023-06-27 PROCEDURE — 25000003 PHARM REV CODE 250: Performed by: FAMILY MEDICINE

## 2023-06-27 PROCEDURE — 63600175 PHARM REV CODE 636 W HCPCS: Performed by: FAMILY MEDICINE

## 2023-06-27 PROCEDURE — 37000008 HC ANESTHESIA 1ST 15 MINUTES: Performed by: INTERNAL MEDICINE

## 2023-06-27 PROCEDURE — 37000009 HC ANESTHESIA EA ADD 15 MINS: Performed by: INTERNAL MEDICINE

## 2023-06-27 PROCEDURE — 21400001 HC TELEMETRY ROOM

## 2023-06-27 PROCEDURE — 99233 SBSQ HOSP IP/OBS HIGH 50: CPT | Mod: NSCH,95,, | Performed by: INTERNAL MEDICINE

## 2023-06-27 PROCEDURE — 99233 SBSQ HOSP IP/OBS HIGH 50: CPT | Mod: ,,, | Performed by: INTERNAL MEDICINE

## 2023-06-27 PROCEDURE — 36573 INSJ PICC RS&I 5 YR+: CPT

## 2023-06-27 PROCEDURE — 99233 PR SUBSEQUENT HOSPITAL CARE,LEVL III: ICD-10-PCS | Mod: NSCH,95,, | Performed by: INTERNAL MEDICINE

## 2023-06-27 PROCEDURE — 36415 COLL VENOUS BLD VENIPUNCTURE: CPT | Performed by: INTERNAL MEDICINE

## 2023-06-27 PROCEDURE — 99233 PR SUBSEQUENT HOSPITAL CARE,LEVL III: ICD-10-PCS | Mod: ,,, | Performed by: INTERNAL MEDICINE

## 2023-06-27 PROCEDURE — 25000003 PHARM REV CODE 250: Performed by: NURSE PRACTITIONER

## 2023-06-27 PROCEDURE — 63600175 PHARM REV CODE 636 W HCPCS: Performed by: STUDENT IN AN ORGANIZED HEALTH CARE EDUCATION/TRAINING PROGRAM

## 2023-06-27 PROCEDURE — 25000003 PHARM REV CODE 250: Performed by: STUDENT IN AN ORGANIZED HEALTH CARE EDUCATION/TRAINING PROGRAM

## 2023-06-27 PROCEDURE — 80048 BASIC METABOLIC PNL TOTAL CA: CPT | Performed by: INTERNAL MEDICINE

## 2023-06-27 PROCEDURE — 25000003 PHARM REV CODE 250: Performed by: INTERNAL MEDICINE

## 2023-06-27 RX ORDER — LIDOCAINE HYDROCHLORIDE 10 MG/ML
INJECTION, SOLUTION EPIDURAL; INFILTRATION; INTRACAUDAL; PERINEURAL
Status: DISCONTINUED | OUTPATIENT
Start: 2023-06-27 | End: 2023-06-27

## 2023-06-27 RX ORDER — SODIUM CHLORIDE 9 MG/ML
INJECTION, SOLUTION INTRAVENOUS ONCE
Status: DISCONTINUED | OUTPATIENT
Start: 2023-06-27 | End: 2023-06-27 | Stop reason: HOSPADM

## 2023-06-27 RX ORDER — SODIUM CHLORIDE 9 MG/ML
INJECTION, SOLUTION INTRAVENOUS
Status: DISCONTINUED | OUTPATIENT
Start: 2023-06-27 | End: 2023-06-28 | Stop reason: HOSPADM

## 2023-06-27 RX ORDER — MAG HYDROX/ALUMINUM HYD/SIMETH 200-200-20
30 SUSPENSION, ORAL (FINAL DOSE FORM) ORAL EVERY 6 HOURS PRN
Status: DISCONTINUED | OUTPATIENT
Start: 2023-06-27 | End: 2023-06-28 | Stop reason: HOSPADM

## 2023-06-27 RX ORDER — PROPOFOL 10 MG/ML
VIAL (ML) INTRAVENOUS
Status: DISCONTINUED | OUTPATIENT
Start: 2023-06-27 | End: 2023-06-27

## 2023-06-27 RX ORDER — SODIUM CHLORIDE 0.9 % (FLUSH) 0.9 %
10 SYRINGE (ML) INJECTION
Status: DISCONTINUED | OUTPATIENT
Start: 2023-06-27 | End: 2023-06-28 | Stop reason: HOSPADM

## 2023-06-27 RX ADMIN — PROPOFOL 30 MG: 10 INJECTION, EMULSION INTRAVENOUS at 07:06

## 2023-06-27 RX ADMIN — PROPOFOL 50 MG: 10 INJECTION, EMULSION INTRAVENOUS at 07:06

## 2023-06-27 RX ADMIN — ASPIRIN 81 MG CHEWABLE TABLET 81 MG: 81 TABLET CHEWABLE at 09:06

## 2023-06-27 RX ADMIN — SODIUM CHLORIDE: 9 INJECTION, SOLUTION INTRAVENOUS at 12:06

## 2023-06-27 RX ADMIN — CEFTRIAXONE 2 G: 2 INJECTION, POWDER, FOR SOLUTION INTRAMUSCULAR; INTRAVENOUS at 12:06

## 2023-06-27 RX ADMIN — PROPOFOL 4 MG: 10 INJECTION, EMULSION INTRAVENOUS at 07:06

## 2023-06-27 RX ADMIN — LEVOTHYROXINE SODIUM 112 MCG: 0.11 TABLET ORAL at 05:06

## 2023-06-27 RX ADMIN — TRAMADOL HYDROCHLORIDE 50 MG: 50 TABLET, COATED ORAL at 05:06

## 2023-06-27 RX ADMIN — CLOPIDOGREL BISULFATE 75 MG: 75 TABLET ORAL at 09:06

## 2023-06-27 RX ADMIN — SODIUM CHLORIDE: 9 INJECTION, SOLUTION INTRAVENOUS at 07:06

## 2023-06-27 RX ADMIN — TRAMADOL HYDROCHLORIDE 50 MG: 50 TABLET, COATED ORAL at 06:06

## 2023-06-27 RX ADMIN — LIDOCAINE HYDROCHLORIDE 100 MG: 10 SOLUTION INTRAVENOUS at 07:06

## 2023-06-27 RX ADMIN — ALUMINUM HYDROXIDE, MAGNESIUM HYDROXIDE, AND DIMETHICONE 30 ML: 200; 20; 200 SUSPENSION ORAL at 10:06

## 2023-06-27 RX ADMIN — FERROUS SULFATE TAB 325 MG (65 MG ELEMENTAL FE) 1 EACH: 325 (65 FE) TAB at 09:06

## 2023-06-27 RX ADMIN — PANTOPRAZOLE SODIUM 40 MG: 40 TABLET, DELAYED RELEASE ORAL at 08:06

## 2023-06-27 NOTE — SUBJECTIVE & OBJECTIVE
Interval History: No acute issues overnight. STARLA positive for endocarditis.     Review of Systems   Constitutional:  Negative for fatigue and fever.   HENT:  Negative for sinus pressure.    Eyes:  Negative for visual disturbance.   Respiratory:  Negative for shortness of breath.    Cardiovascular:  Negative for chest pain.   Gastrointestinal:  Negative for nausea and vomiting.   Genitourinary:  Negative for difficulty urinating.   Musculoskeletal:  Negative for back pain.   Skin:  Negative for rash.   Neurological:  Negative for headaches.   Psychiatric/Behavioral:  Negative for confusion.    Objective:     Vital Signs (Most Recent):  Temp: 97.7 °F (36.5 °C) (patient back from procedure) (06/27/23 0949)  Pulse: 67 (06/27/23 0949)  Resp: 18 (06/27/23 0949)  BP: 135/63 (06/27/23 0949)  SpO2: 96 % (06/27/23 0949) Vital Signs (24h Range):  Temp:  [97 °F (36.1 °C)-98.8 °F (37.1 °C)] 97.7 °F (36.5 °C)  Pulse:  [60-71] 67  Resp:  [17-20] 18  SpO2:  [95 %-100 %] 96 %  BP: (103-138)/(55-65) 135/63     Weight: 62.1 kg (137 lb)  Body mass index is 19.66 kg/m².    Intake/Output Summary (Last 24 hours) at 6/27/2023 1104  Last data filed at 6/26/2023 1549  Gross per 24 hour   Intake 148.96 ml   Output --   Net 148.96 ml         Physical Exam  Constitutional:       General: He is not in acute distress.     Appearance: He is well-developed. He is not diaphoretic.   HENT:      Head: Normocephalic and atraumatic.   Eyes:      Pupils: Pupils are equal, round, and reactive to light.   Cardiovascular:      Rate and Rhythm: Normal rate and regular rhythm.      Heart sounds: Normal heart sounds. No murmur heard.    No friction rub. No gallop.   Pulmonary:      Effort: Pulmonary effort is normal. No respiratory distress.      Breath sounds: Normal breath sounds. No stridor. No wheezing or rales.   Abdominal:      General: Bowel sounds are normal. There is no distension.      Palpations: Abdomen is soft. There is no mass.      Tenderness:  There is no abdominal tenderness. There is no guarding.   Skin:     General: Skin is warm.      Findings: No erythema.   Neurological:      Mental Status: He is alert and oriented to person, place, and time.           Significant Labs: All pertinent labs within the past 24 hours have been reviewed.    Significant Imaging: I have reviewed all pertinent imaging results/findings within the past 24 hours.

## 2023-06-27 NOTE — ASSESSMENT & PLAN NOTE
-BC growing strep, final pending  -repeat BC today  -cont Rocephin IV  -UA and CXR negative, afebrile, unclear etiology?  -consult ID for assistance, patient has h/o artifical heart valve, may need STARLA?    Strep bacteremia noted  STARLA positive for endocarditis  Will increase rocephin to 2g q12h   Will discuss with ID on possible need for gentamicin x 2 weeks  Will need 6 weeks of total therapy   Estimated date of completion 8/8/2023  PICC line ordered

## 2023-06-27 NOTE — ASSESSMENT & PLAN NOTE
6/25/2023   Awaiting cultures result   Pending ID input   Discussed with family concern for bacteremia in the presence of a prosthetic valve   Pending ID input   Possible need for STARLA if patient can tolerate     6/26/23  -STARLA requested by ID  -Keep NPO after MN    6/27/23  -STARLA showed highly mobile small vegetation attached on the root of anterior mitral valve leaflet, next to aortic root as well as aortic root wall thickening  -ID on board  -Continue abx

## 2023-06-27 NOTE — BRIEF OP NOTE
[facility] - Cath Lab ([facility])  General Surgery  Operative Note    SUMMARY     Date of Procedure: 6/27/2023     Procedure: Procedure(s) (LRB):  ECHOCARDIOGRAM, TRANSESOPHAGEAL (N/A)       Surgeon(s) and Role:     * Juan Crane MD - Primary    Assisting Surgeon: None    Pre-Operative Diagnosis: Bacteremia [R78.81]    Post-Operative Diagnosis: Post-Op Diagnosis Codes:     * Bacteremia [R78.81]    Anesthesia: Monitor Anesthesia Care    Operative Findings (including complications, if any):   Procedure Description: The risks, benefits, and alternatives of the procedure expalined in detail with patient and family. The patient voices understanding and all questions have been addressed. The patient agrees to proceed as planned.   The patient was sedated by anesthesiology service. The probe was advanced via throat into esophagus smoothly. The patient tolerated the procedure well and there was no complications. The probed was withdrawal at the end of study. The patient was hemodynamically stable.   Estimated Blood Loss: none.   Findings / Operative Note:   A highly mobile small vegetation attached on the root of anterior mitral valve leaflet, next to aortic root. S/p bioprosthetic AVR. NO vegetation or calcification on AVR. Some aortic root wall thickening, no aortic root visualized. Tricuspid and pulmonic valve ok.   Normal EF, mild MR and TR  The cardiology and ID services were notified.     Ok to discharge to the floor once hemodynamically stable.        Estimated Blood Loss (EBL): * No values recorded between 6/27/2023  7:30 AM and 6/27/2023  7:50 AM *           Implants: * No implants in log *    Specimens:   Specimen (24h ago, onward)      None                    Condition: Good    Disposition: PACU - hemodynamically stable.    Attestation: I was present and scrubbed for the entire procedure.

## 2023-06-27 NOTE — PROGRESS NOTES
[facility] Medicine  Progress Note    Patient Name: Juan Castellanos  MRN: [id number]  Patient Class: IP- Inpatient   Admission Date: 6/22/2023  Length of Stay: 2 days  Attending Physician: Cooper Zayas MD  Primary Care Provider: Devyn Beck MD        Subjective:     Principal Problem:Streptococcal endocarditis        HPI:  Juan Castellanos is a 89 y.o. male with a PMH  has a past medical history of Allergy, Anxiety, Arthritis, Blood transfusion, Cataract, Depression, GERD (gastroesophageal reflux disease), Heart murmur, Hypercholesterolemia, Primary hypertension (11/24/2021), Prostate cancer, and Thyroid disease.  Presented to the ER accompanied by family for evaluation of epigastric/right upper quadrant abdominal pain that radiated to his right lower quadrant/umbilical region.  Patient reports he was sitting down watching that she baseball game and reports that the pain started all of a sudden.  Patient believed it was his acid reflux symptoms.  Patient states he took Rosa-Dana without any relief of his symptoms.  Only reported cardiac history was aortic valve replacement.  Denies any stents or history of mi/bypass.  Currently asymptomatic and denies any other associated symptoms accompanied with abdominal pain.  ER workup showed BUN/creatinine of 30/2.0 (previously 16/1.3), , troponin of 0.623, and EKG showed sinus tach with rate of 102 QT/QTC of 350/456.  Remainder of workup was unremarkable.  Patient received total of 40 Protonix and GI cocktail in ED.  Hospital Medicine consulted to admit patient for cardiac workup.  Patient family are in agreement with treatment plan.  Patient will be admitted under observation status.  PCP: Devyn Beck        Overview/Hospital Course:  89 y/o male admitted with abdominal pain, LUQ and radiates across the top, exacerbated after he eats, with no improvement after taking rosa-seltzer. Troponin was bumped on admission and continued to  trend up. Started on heparin gtt nomogram. Cr 1.9 (near his baseline of 1.6-1.9). Cardiology consulted for assistance. BC obtained on admission, growing strep, started on Rocephin IV (6/24), repeat BC 6/25, pending.  Echo EF 60%, grade II LV diastolic dysfunction, mild MR, TR with pulmonary hypertension. EKG S tachy.  6/26: cards on case. STARLA tomorrow. Repeat blood cultures NG. Cont rocephin.   6/27: STARLA positive for small highly mobile vegetation attached at the root of anterior mitral valve leaflet close to the aortic root. Repeat blood cultures negative. Increase rocephin to 2g q12h. Will discuss with ID on addition of gentamicin x 2 weeks. Picc line ordered. Will need 6 weeks IV abx therapy.       Interval History: No acute issues overnight. STARLA positive for endocarditis.     Review of Systems   Constitutional:  Negative for fatigue and fever.   HENT:  Negative for sinus pressure.    Eyes:  Negative for visual disturbance.   Respiratory:  Negative for shortness of breath.    Cardiovascular:  Negative for chest pain.   Gastrointestinal:  Negative for nausea and vomiting.   Genitourinary:  Negative for difficulty urinating.   Musculoskeletal:  Negative for back pain.   Skin:  Negative for rash.   Neurological:  Negative for headaches.   Psychiatric/Behavioral:  Negative for confusion.    Objective:     Vital Signs (Most Recent):  Temp: 97.7 °F (36.5 °C) (patient back from procedure) (06/27/23 0949)  Pulse: 67 (06/27/23 0949)  Resp: 18 (06/27/23 0949)  BP: 135/63 (06/27/23 0949)  SpO2: 96 % (06/27/23 0949) Vital Signs (24h Range):  Temp:  [97 °F (36.1 °C)-98.8 °F (37.1 °C)] 97.7 °F (36.5 °C)  Pulse:  [60-71] 67  Resp:  [17-20] 18  SpO2:  [95 %-100 %] 96 %  BP: (103-138)/(55-65) 135/63     Weight: 62.1 kg (137 lb)  Body mass index is 19.66 kg/m².    Intake/Output Summary (Last 24 hours) at 6/27/2023 1104  Last data filed at 6/26/2023 1549  Gross per 24 hour   Intake 148.96 ml   Output --   Net 148.96 ml          Physical Exam  Constitutional:       General: He is not in acute distress.     Appearance: He is well-developed. He is not diaphoretic.   HENT:      Head: Normocephalic and atraumatic.   Eyes:      Pupils: Pupils are equal, round, and reactive to light.   Cardiovascular:      Rate and Rhythm: Normal rate and regular rhythm.      Heart sounds: Normal heart sounds. No murmur heard.    No friction rub. No gallop.   Pulmonary:      Effort: Pulmonary effort is normal. No respiratory distress.      Breath sounds: Normal breath sounds. No stridor. No wheezing or rales.   Abdominal:      General: Bowel sounds are normal. There is no distension.      Palpations: Abdomen is soft. There is no mass.      Tenderness: There is no abdominal tenderness. There is no guarding.   Skin:     General: Skin is warm.      Findings: No erythema.   Neurological:      Mental Status: He is alert and oriented to person, place, and time.           Significant Labs: All pertinent labs within the past 24 hours have been reviewed.    Significant Imaging: I have reviewed all pertinent imaging results/findings within the past 24 hours.        Assessment/Plan:      * Streptococcal endocarditis  -BC growing strep, final pending  -repeat BC today  -cont Rocephin IV  -UA and CXR negative, afebrile, unclear etiology?  -consult ID for assistance, patient has h/o artifical heart valve, may need STARLA?    Strep bacteremia noted  STARLA positive for endocarditis  Will increase rocephin to 2g q12h   Will discuss with ID on possible need for gentamicin x 2 weeks  Will need 6 weeks of total therapy   Estimated date of completion 8/8/2023  PICC line ordered    NSTEMI (non-ST elevated myocardial infarction)  -tele monitoring  -troponin trend 0.623 > 1.671 > 3.887 > 5.956 > 5.020  -cont ASA/Plavix (heparin gtt stopped 6/24)  -cardiology consulted, offered LHC and family unsure if they want to proceed, and cards is hesitant with advanced age and MAGO    Cont medical  management     GERD (gastroesophageal reflux disease)  -Continue home PPI/Antacids as needed     MAGO (acute kidney injury)  Patient with acute kidney injury likely due to IVVD/dehydration and pre-renal azotemia MAGO is currently being treated. Labs reviewed- Renal function/electrolytes with Estimated Creatinine Clearance: 28.8 mL/min (A) (based on SCr of 1.5 mg/dL (H)). according to latest data. Monitor urine output and serial BMP and adjust therapy as needed. Avoid nephrotoxins and renally dose meds for GFR listed above.   -improved after 500 ml bolus given 6/24  -cont to hold HCTZ and losartan  -repeat labs in am    Stable   Cont to monitor     Epigastric pain  CT abdomen pelvis without acute findings.  Asymptomatic at this time.  -analgesics prn  -continue home PPI/antiacids  -denies any further pain    Primary hypertension  Chronic, controlled  -Latest blood pressure and vitals reviewed-   Temp:  [97.4 °F (36.3 °C)-98.5 °F (36.9 °C)]   Pulse:  [64-87]   Resp:  [14-18]   BP: (102-131)/(56-71)   SpO2:  [94 %-100 %] .   -Home meds for hypertension were reviewed and noted below.   Hypertension Medications         -While in the hospital, will manage blood pressure as follows; Adjust home antihypertensive regimen as follows- hold HCTZ and losartan d/t hypotension and MAGO  -PRN anti-hypertensive medication if patient's BP > 160/100   -optimize pain management    Hypothyroidism  Patient has chronic hypothyroidism. TFTs reviewed-   Lab Results   Component Value Date    TSH 4.0 10/11/2007   -Will continue chronic levothyroxine and adjust for and clinical changes    Hyperlipidemia  Patient is chronically on statin.will continue for now. Last Lipid Panel:   Lab Results   Component Value Date    CHOL 157 06/05/2019    HDL 45 06/05/2019    LDLCALC 69.2 06/05/2019    TRIG 214 (H) 06/05/2019    CHOLHDL 28.7 06/05/2019       S/P AVR (aortic valve replacement)  -Echo showed patent valve  -follows Dr. Crane OP, will need follow up    -ID consulted with bacteremia and h/o heart valve, may need STARLA      VTE Risk Mitigation (From admission, onward)         Ordered     IP VTE HIGH RISK PATIENT  Once         06/23/23 0334     Place sequential compression device  Until discontinued         06/23/23 0334                Discharge Planning   JIMBO: 6/24/2023     Code Status: DNR   Is the patient medically ready for discharge?: No    Reason for patient still in hospital (select all that apply): Patient trending condition  Discharge Plan A: Home with family, Home Health                  Cooper Zayas MD  Department of Hospital Medicine   [facility] - Telemetry ([facility])

## 2023-06-27 NOTE — ASSESSMENT & PLAN NOTE
-tele monitoring  -troponin trend 0.623 > 1.671 > 3.887 > 5.956 > 5.020  -cont ASA/Plavix (heparin gtt stopped 6/24)  -cardiology consulted, offered LHC and family unsure if they want to proceed, and cards is hesitant with advanced age and MAGO    Cont medical management

## 2023-06-27 NOTE — PLAN OF CARE
Pt awakened after procedure and remains AAOx3 at time of transfer.  An hour after admin of cetaciaine, bedside swallow assessment performed; no s/s  of aspiration at this time.  Discharge instructions for Post STARLA procedure in AVS.  Report given to Nikita Valerio RN; no further questions at this time.   Daughter at bedside at time of pt's return to room.

## 2023-06-27 NOTE — ANESTHESIA PREPROCEDURE EVALUATION
06/27/2023  Juan Castellanos is a 89 y.o., male.    [facility] Anesthesia Evaluation    I have reviewed the Patient Summary Reports.    I have reviewed the Nursing Notes.   I have reviewed the Medications.     Review of Systems  Anesthesia Hx:  No problems with previous Anesthesia  Neg history of prior surgery. Denies Family Hx of Anesthesia complications.   Denies Personal Hx of Anesthesia complications.   Social:  Non-Smoker and No Alcohol Use    Hematology/Oncology:         -- Anemia: --  Cancer in past history (prostate cancer hx):  Other (see Oncology comments)   EENT/Dental:EENT/Dental Normal   Cardiovascular:   Exercise tolerance: poor Valvular problems/Murmurs (aortic valve replacement)   Hx of Valve Surgery     Renal/:  Renal/ Normal     Hepatic/GI:   GERD, poorly controlled    Musculoskeletal:  Musculoskeletal Normal    Neurological:  Neurology Normal    Endocrine:   Hypothyroidism    Dermatological:  Skin Normal    Psych:  Psychiatric Normal           Physical Exam  General:  Well nourished    Airway/Jaw/Neck:  Airway Findings: Mouth Opening: Normal Tongue: Normal  Mallampati: I  TM Distance: Normal, at least 6 cm        Eyes/Ears/Nose:       Dental:  Dental Findings: In tact   Chest/Lungs:  Chest/Lungs Findings: Clear to auscultation and Normal Respiratory Rate     Heart/Vascular:  Heart Findings: Rate: Normal  Rhythm: Regular Rhythm  Heart Murmur  Systolic        Abdomen:  Abdomen Findings:  Tenderness and Soft     Musculoskeletal:       Skin:        Mental Status:  Mental Status Findings:  Alert and Oriented and Cooperative         Anesthesia Plan  Type of Anesthesia, risks & benefits discussed:  Anesthesia Type:  MAC  Patient's Preference:   Post Op Pain Control Plan:   Induction:   IV  Informed Consent: Patient understands risks and agrees with Anesthesia plan.  Questions answered. Anesthesia  consent signed with patient.  ASA Score: 3     Day of Surgery Review of History & Physical: I have reviewed the patient's H&P dated  and have interviewed and examined the patient. There are no significant changes.         Ready For Surgery From Anesthesia Perspective.                                                                                                                   06/27/2023  Juan Castellanos is a 89 y.o., male.    [facility] Anesthesia Evaluation    I have reviewed the Patient Summary Reports.    I have reviewed the Nursing Notes.   I have reviewed the Medications.     Review of Systems  Anesthesia Hx:  No problems with previous Anesthesia  Neg history of prior surgery. Denies Family Hx of Anesthesia complications.   Denies Personal Hx of Anesthesia complications.   Social:  Non-Smoker and No Alcohol Use    Hematology/Oncology:         -- Anemia: --  Cancer in past history (prostate cancer hx):  Other (see Oncology comments)   EENT/Dental:EENT/Dental Normal   Cardiovascular:   Exercise tolerance: poor Valvular problems/Murmurs (aortic valve replacement)   Hx of Valve Surgery     Renal/:  Renal/ Normal     Hepatic/GI:   GERD, poorly controlled    Musculoskeletal:  Musculoskeletal Normal    Neurological:  Neurology Normal    Endocrine:   Hypothyroidism    Dermatological:  Skin Normal    Psych:  Psychiatric Normal           Physical Exam  General:  Well nourished    Airway/Jaw/Neck:  Airway Findings: Mouth Opening: Normal Tongue: Normal  Mallampati: I  TM Distance: Normal, at least 6 cm        Eyes/Ears/Nose:       Dental:  Dental Findings: In tact   Chest/Lungs:  Chest/Lungs Findings: Clear to auscultation and Normal Respiratory Rate     Heart/Vascular:  Heart Findings: Rate: Normal  Rhythm: Regular Rhythm  Heart Murmur  Systolic        Abdomen:  Abdomen Findings:  Tenderness and Soft     Musculoskeletal:       Skin:        Mental Status:  Mental Status Findings:  Alert and Oriented and Cooperative          Anesthesia Plan  Type of Anesthesia, risks & benefits discussed:  Anesthesia Type:  MAC  Patient's Preference:   Post Op Pain Control Plan:   Induction:   IV  Informed Consent: Patient understands risks and agrees with Anesthesia plan.  Questions answered. Anesthesia consent signed with patient.  ASA Score: 3     Day of Surgery Review of History & Physical: I have reviewed the patient's H&P dated  and have interviewed and examined the patient. There are no significant changes.         Ready For Surgery From Anesthesia Perspective.                                                                                                                   06/27/2023  Juan Castellanos is a 89 y.o., male.    EKG  Vent. Rate : 102 BPM     Atrial Rate : 102 BPM      P-R Int : 184 ms          QRS Dur : 098 ms       QT Int : 350 ms       P-R-T Axes : 032 -18 068 degrees      QTc Int : 456 ms     Sinus tachycardia   Otherwise normal ECG   When compared with ECG of 24-NOV-2021 13:28,   Vent. rate has increased BY  34 BPM   Confirmed by BROCK STEVENSON MD ([id number]) on 6/25/2023 11:53:52 AM     Results for orders placed during the hospital encounter of 06/22/23  Echo    Interpretation Summary  · The left ventricle is normal in size with severe concentric hypertrophy and normal systolic function.  · Mild left atrial enlargement.  · Grade II left ventricular diastolic dysfunction.  · The estimated PA systolic pressure is 41 mmHg.  · Normal right ventricular size with normal right ventricular systolic function.  · Intermediate central venous pressure (8 mmHg).  · The estimated ejection fraction is 60%.  · There is a bioprosthetic aortic valve present. There is no aortic insufficiency present. Prosthetic aortic valve is normal.  · The aortic valve mean gradient is 24 mmHg with a dimensionless index of 0.64.  · Mild mitral regurgitation.  · Mild tricuspid regurgitation.  · There is pulmonary hypertension.      Pre-op Assessment    I have  reviewed the Patient Summary Reports.     I have reviewed the Nursing Notes. I have reviewed the NPO Status.   I have reviewed the Medications.     Review of Systems  Anesthesia Hx:  No problems with previous Anesthesia  Denies Family Hx of Anesthesia complications.   Denies Personal Hx of Anesthesia complications.   Social:  Non-Smoker, No Alcohol Use    Hematology/Oncology:         -- Anemia: Current/Recent Cancer. Other (see Oncology comments) Oncology Comments: Prostate Cancer    EENT/Dental:   chronic allergic rhinitis   Cardiovascular:   Exercise tolerance: poor Hypertension Valvular problems/Murmurs Past MI  ECG has been reviewed. Hx of aortic valve replacement    Renal/:   Chronic Renal Disease, ARF    Hepatic/GI:   GERD, well controlled    Musculoskeletal:   Arthritis     Endocrine:   Hypothyroidism    Psych:   Psychiatric History anxiety          Physical Exam  General: Well nourished, Cooperative, Alert and Oriented    Airway:  Mallampati: II   Mouth Opening: Normal  TM Distance: Normal  Tongue: Normal  Neck ROM: Normal ROM    Dental:  Intact, Periodontal disease        Anesthesia Plan  Type of Anesthesia, risks & benefits discussed:    Anesthesia Type: MAC  Intra-op Monitoring Plan: Standard ASA Monitors  Post Op Pain Control Plan: multimodal analgesia  Induction:  IV  Informed Consent: Informed consent signed with the Patient and all parties understand the risks and agree with anesthesia plan.  All questions answered.   ASA Score: 4  Day of Surgery Review of History & Physical: H&P Update referred to the surgeon/provider.    Ready For Surgery From Anesthesia Perspective.     .

## 2023-06-27 NOTE — PLAN OF CARE
Referral sent to [facility] for IV antibiotics.  Notified liaisonMadonna.       06/27/23 7984   Post-Acute Status   Post-Acute Authorization IV Infusion   Home Health Status  --    IV Infusion Status Referral(s) sent   Discharge Plan   Discharge Plan A Home Health;Home with family

## 2023-06-27 NOTE — TRANSFER OF CARE
"Anesthesia Transfer of Care Note    Patient: Juan Castellanos    Procedure(s) Performed: Procedure(s) (LRB):  ECHOCARDIOGRAM, TRANSESOPHAGEAL (N/A)    Patient location: Other: [facility]    Anesthesia Type: MAC    Transport from OR: Transported from OR on room air with adequate spontaneous ventilation    Post pain: adequate analgesia    Post assessment: no apparent anesthetic complications and tolerated procedure well    Post vital signs: stable    Level of consciousness: awake, alert and responds to stimulation    Nausea/Vomiting: no nausea/vomiting    Complications: none    Transfer of care protocol was followedComments: Report given to CVRU RN at bedside. RN given opportunity to ask questions or clarify concerns. No Concerns verbalized. RN was asked if ready to assume care of patient. RN verbally confirmed. Pt. Left in stable condition. SV. Vital Signs Return to Near Baseline. No s/s of distress noted.       Last vitals:   Visit Vitals  BP (!) 103/55   Pulse 62   Temp 36.8 °C (98.3 °F) (Oral)   Resp 17   Ht 5' 10" (1.778 m)   Wt 62.4 kg (137 lb 9.1 oz)   SpO2 96%   BMI 19.74 kg/m²     "

## 2023-06-27 NOTE — PROCEDURES
"Juan Castellanos is a 89 y.o. male patient.    Temp: 98.9 °F (37.2 °C) (06/27/23 1210)  Pulse: 63 (06/27/23 1210)  Resp: 16 (06/27/23 1210)  BP: (!) 102/52 (06/27/23 1210)  SpO2: 95 % (06/27/23 1210)  Weight: 62.1 kg (137 lb) (06/27/23 0800)  Height: 5' 10" (177.8 cm) (06/27/23 0800)    PICC  Date/Time: 6/27/2023 3:59 PM  Performed by: Isaias Nunez RN  Consent Done: Yes  Time out: Immediately prior to procedure a time out was called to verify the correct patient, procedure, equipment, support staff and site/side marked as required  Indications: med administration  Anesthesia: local infiltration  Local anesthetic: lidocaine 1% without epinephrine  Anesthetic Total (mL): 2  Preparation: skin prepped with chlorhexidine (without alcohol)  Skin prep agent dried: skin prep agent completely dried prior to procedure  Sterile barriers: all five maximum sterile barriers used - cap, mask, sterile gown, sterile gloves, and large sterile sheet  Hand hygiene: hand hygiene performed prior to central venous catheter insertion  Location details: left brachial  Catheter type: double lumen  Catheter size: 4 Fr  Catheter Length: 38cm    Ultrasound guidance: yes  Vessel Caliber: medium and patent, compressibility normal  Needle advanced into vessel with real time Ultrasound guidance.  Guidewire confirmed in vessel.  Sterile sheath used.  Number of attempts: 1  Post-procedure: blood return through all ports, chlorhexidine patch and sterile dressing applied  Specimens: No  Implants: No  Comments: Awaiting xray for confirmation of placement         Isaias Nunez RN  6/27/2023    "

## 2023-06-27 NOTE — ANESTHESIA POSTPROCEDURE EVALUATION
Anesthesia Post Evaluation    Patient: Juan Castellanos    Procedure(s) Performed: Procedure(s) (LRB):  ECHOCARDIOGRAM, TRANSESOPHAGEAL (N/A)    Final Anesthesia Type: MAC      Patient location: [facility].  Patient participation: Yes- Able to Participate  Level of consciousness: awake and alert and oriented  Post-procedure vital signs: reviewed and stable  Pain management: adequate  Airway patency: patent  IGOR mitigation strategies: Multimodal analgesia and Extubation and recovery carried out in lateral, semiupright, or other nonsupine position  PONV status at discharge: No PONV  Anesthetic complications: no      Cardiovascular status: blood pressure returned to baseline  Respiratory status: unassisted  Hydration status: euvolemic  Follow-up not needed.  Comments: Report given to GI PACU RN. Hand Off Tool Used. RN given opportunity to ask questions or clarify concerns. No Concerns verbalized. RN was asked if ready to assume care of patient. RN verbally confirmed. Pt. Left in stable condition. SV. Vital Signs Return to Near Baseline. No s/s of distress noted.           Vitals Value Taken Time   /58 06/27/23 0752        Pulse 60 06/27/23 0752   Resp 15 06/27/23 0752   SpO2 100 06/27/23 0752         No case tracking events are documented in the log.      Pain/Cruz Score: Pain Rating Prior to Med Admin: 6 (6/27/2023  5:42 AM)  Pain Rating Post Med Admin: 0 (6/26/2023  8:43 PM)

## 2023-06-27 NOTE — PLAN OF CARE
Patient wanted [facility].  Not in patient's network.    Preference obtained for [facility].  Referral sent via [employer].       06/27/23 3166   Post-Acute Status   Post-Acute Authorization Home Health   Home Health Status Referrals Sent   Discharge Plan   Discharge Plan A Home Health;Home with family     4:15pm  No response from [facility].  Resent in [facility].  Spoke with [facility] intake.  They did not receive either referrals.  Hard faxed referral.

## 2023-06-27 NOTE — SUBJECTIVE & OBJECTIVE
Review of Systems   Constitutional: Positive for malaise/fatigue.   HENT: Negative.     Eyes: Negative.    Cardiovascular: Negative.    Respiratory: Negative.     Endocrine: Negative.    Hematologic/Lymphatic: Negative.    Skin: Negative.    Musculoskeletal:  Positive for arthritis and joint pain.   Gastrointestinal: Negative.    Genitourinary: Negative.    Neurological: Negative.    Psychiatric/Behavioral: Negative.     Allergic/Immunologic: Negative.    Objective:     Vital Signs (Most Recent):  Temp: 97.7 °F (36.5 °C) (patient back from procedure) (06/27/23 0949)  Pulse: 67 (06/27/23 0949)  Resp: 18 (06/27/23 0949)  BP: 135/63 (06/27/23 0949)  SpO2: 96 % (06/27/23 0949) Vital Signs (24h Range):  Temp:  [97 °F (36.1 °C)-98.8 °F (37.1 °C)] 97.7 °F (36.5 °C)  Pulse:  [60-71] 67  Resp:  [17-20] 18  SpO2:  [95 %-100 %] 96 %  BP: (103-138)/(55-65) 135/63     Weight: 62.1 kg (137 lb)  Body mass index is 19.66 kg/m².     SpO2: 96 %         Intake/Output Summary (Last 24 hours) at 6/27/2023 1134  Last data filed at 6/26/2023 1549  Gross per 24 hour   Intake 148.96 ml   Output --   Net 148.96 ml       Lines/Drains/Airways       Peripheral Intravenous Line  Duration                  Peripheral IV - Single Lumen 06/26/23 1445 20 G Anterior;Left Forearm <1 day                       Physical Exam  Vitals and nursing note reviewed.   Constitutional:       General: He is not in acute distress.     Appearance: Normal appearance. He is well-developed. He is not diaphoretic.   HENT:      Head: Normocephalic and atraumatic.   Eyes:      General:         Right eye: No discharge.         Left eye: No discharge.      Pupils: Pupils are equal, round, and reactive to light.   Neck:      Thyroid: No thyromegaly.      Vascular: No JVD.      Trachea: No tracheal deviation.   Cardiovascular:      Rate and Rhythm: Normal rate and regular rhythm.      Heart sounds: S1 normal and S2 normal. Murmur heard.   Pulmonary:      Effort: Pulmonary  effort is normal. No respiratory distress.      Breath sounds: Normal breath sounds. No wheezing or rales.   Abdominal:      General: There is no distension.      Tenderness: There is no rebound.   Musculoskeletal:      Cervical back: Neck supple.      Right lower leg: No edema.      Left lower leg: No edema.   Skin:     General: Skin is warm and dry.      Findings: No erythema.   Neurological:      Mental Status: He is alert and oriented to person, place, and time.   Psychiatric:         Mood and Affect: Mood normal.         Behavior: Behavior normal.          Significant Labs: CMP   Recent Labs   Lab 06/26/23  0459 06/27/23  0512   * 135*   K 3.5 3.6    100   CO2 23 22*   GLU 99 86   BUN 37* 34*   CREATININE 1.6* 1.5*   CALCIUM 8.5* 9.1   ANIONGAP 10 13   , CBC   Recent Labs   Lab 06/26/23  0459   WBC 13.36*   HGB 7.9*   HCT 23.5*   *   , Troponin No results for input(s): TROPONINI in the last 48 hours., and All pertinent lab results from the last 24 hours have been reviewed.    Significant Imaging: Echocardiogram: Transthoracic echo (TTE) complete (Cupid Only):   Results for orders placed or performed during the hospital encounter of 06/22/23   Echo   Result Value Ref Range    BSA 1.86 m2    TDI SEPTAL 0.07 m/s    LV LATERAL E/E' RATIO 13.80 m/s    LV SEPTAL E/E' RATIO 19.71 m/s    LA WIDTH 3.80 cm    IVC diameter 2.16 cm    Left Ventricular Outflow Tract Mean Velocity 1.40 cm/s    Left Ventricular Outflow Tract Mean Gradient 9.33 mmHg    TV mean gradient 21 mmHg    TDI LATERAL 0.10 m/s    LVIDd 4.53 3.5 - 6.0 cm    IVS 1.49 (A) 0.6 - 1.1 cm    Posterior Wall 1.46 (A) 0.6 - 1.1 cm    Ao root annulus 2.24 cm    LVIDs 2.80 2.1 - 4.0 cm    FS 38 28 - 44 %    LA volume 66.25 cm3    STJ 2.37 cm    Ascending aorta 2.41 cm    LV mass 271.46 g    LA size 4.32 cm    TAPSE 1.80 cm    Left Ventricle Relative Wall Thickness 0.64 cm    AV mean gradient 24 mmHg    AV valve area 1.76 cm2    AV Velocity Ratio  0.63     AV index (prosthetic) 0.64     MV mean gradient 4 mmHg    MV valve area p 1/2 method 2.58 cm2    MV valve area by continuity eq 2.84 cm2    E/A ratio 1.07     Mean e' 0.09 m/s    E wave deceleration time 293.64 msec    IVRT 60.89 msec    LVOT diameter 1.88 cm    LVOT area 2.8 cm2    LVOT peak hira 1.87 m/s    LVOT peak VTI 45.90 cm    Ao peak hira 2.95 m/s    Ao VTI 72.2 cm    RVOT peak hira 0.84 m/s    RVOT peak VTI 17.1 cm    Mr max hira 5.06 m/s    LVOT stroke volume 127.35 cm3    AV peak gradient 35 mmHg    MV peak gradient 9 mmHg    PV mean gradient 1.71 mmHg    E/E' ratio 16.24 m/s    MV Peak E Hira 1.38 m/s    TR Max Hira 2.87 m/s    MV VTI 44.8 cm    MV stenosis pressure 1/2 time 85.16 ms    MV Peak A Hira 1.29 m/s    LV Systolic Volume 29.50 mL    LV Systolic Volume Index 15.8 mL/m2    LV Diastolic Volume 94.13 mL    LV Diastolic Volume Index 50.34 mL/m2    LA Volume Index 35.4 mL/m2    LV Mass Index 145 g/m2    RA Major Axis 3.68 cm    Left Atrium Minor Axis 4.85 cm    Left Atrium Major Axis 4.65 cm    Triscuspid Valve Regurgitation Peak Gradient 33 mmHg    RA Width 2.20 cm    Right Atrial Pressure (from IVC) 8 mmHg    EF 60 %    TV rest pulmonary artery pressure 41 mmHg    Narrative    · The left ventricle is normal in size with severe concentric hypertrophy   and normal systolic function.  · Mild left atrial enlargement.  · Grade II left ventricular diastolic dysfunction.  · The estimated PA systolic pressure is 41 mmHg.  · Normal right ventricular size with normal right ventricular systolic   function.  · Intermediate central venous pressure (8 mmHg).  · The estimated ejection fraction is 60%.  · There is a bioprosthetic aortic valve present. There is no aortic   insufficiency present. Prosthetic aortic valve is normal.  · The aortic valve mean gradient is 24 mmHg with a dimensionless index of   0.64.  · Mild mitral regurgitation.  · Mild tricuspid regurgitation.  · There is pulmonary hypertension.       , EKG: Reviewed, and X-Ray: CXR: X-Ray Chest 1 View (CXR): No results found for this visit on 06/22/23. and X-Ray Chest PA and Lateral (CXR): No results found for this visit on 06/22/23.

## 2023-06-27 NOTE — ASSESSMENT & PLAN NOTE
-Presents with acid reflux type symptoms over the past few days  -Troponin 0.623>1.671>3.887, repeat pending  -Echo pending  -Continue ASA, statin, heparin gtt  -BP soft, will maximize regimen as tolerated  -Discussed invasive mgmt with [facility] once renal function stabilizes/improves, family/pt unsure about proceeding at present time. Continue OMT in interim.      6.24.2023  Asa, plavix and ppi on discharge  See HPI   Conservative management   Treated for bacteremia by hospital medicine , hx of valve, may need STARLA , will await for final cultures , discussed with      6/26/23  -Med mgmt  -Continue ASA, Plavix as H/H permits  -Resume statin as tolerated  -BP too marginal for BB, etc    6/27/23  -Stable, continue OMT

## 2023-06-27 NOTE — ASSESSMENT & PLAN NOTE
Patient with acute kidney injury likely due to IVVD/dehydration and pre-renal azotemia MAGO is currently being treated. Labs reviewed- Renal function/electrolytes with Estimated Creatinine Clearance: 28.8 mL/min (A) (based on SCr of 1.5 mg/dL (H)). according to latest data. Monitor urine output and serial BMP and adjust therapy as needed. Avoid nephrotoxins and renally dose meds for GFR listed above.   -improved after 500 ml bolus given 6/24  -cont to hold HCTZ and losartan  -repeat labs in am    Stable   Cont to monitor

## 2023-06-27 NOTE — INTERVAL H&P NOTE
The patient has been examined and the H&P has been reviewed:    I concur with the findings and no changes have occurred since H&P was written.    Anesthesia risks, benefits and alternative options discussed and understood by patient/family.    STARLA as scheduled to r/o IE. S/p AVR      Active Hospital Problems    Diagnosis  POA    *Bacteremia [R78.81]  Yes    Epigastric pain [R10.13]  Yes    Elevated troponin [R77.8]  Yes    MAGO (acute kidney injury) [N17.9]  Yes    GERD (gastroesophageal reflux disease) [K21.9]  Yes    NSTEMI (non-ST elevated myocardial infarction) [I21.4]  Yes    Primary hypertension [I10]  Yes    Hyperlipidemia [E78.5]  Yes    S/P AVR (aortic valve replacement) [Z95.2]  Not Applicable    Hypothyroidism [E03.9]  Yes      Resolved Hospital Problems   No resolved problems to display.

## 2023-06-27 NOTE — PROGRESS NOTES
"[facility] - Tele[facility] ([facility])  Cardiology  Progress Note    Patient Name: Juan Castellanos  MRN: [id number]  Admission Date: 6/22/2023  Hospital Length of Stay: 2 days  Code Status: DNR   Attending Physician: Cooper Zayas MD   Primary Care Physician: Devyn Beck MD  Expected Discharge Date: 6/24/2023  Principal Problem:Streptococcal endocarditis    Subjective:     Hospital Course:   Mr. Castellanos is a 89 year old male patient whose current medical conditions include AS s/p AVR, prostate cancer, thyroid disease, HTN, hyperlipidemia, and CAD who presented to [facility] ED thsi AM with a chief complaint of left-sided/generalized abdominal discomfort that onset yesterday evening. Associated symptoms included indigestion like discomfort and decreased po intake. Patient denied any associated palpitations, LH, dizziness, fever, chills, nausea, vomiting, or diaphoresis. Took [place] to try to relieve symptoms. Initial workup in ED revealed MAGO (creatinine of 2.0), troponin of 0.623, and BNP of 254 and patient was subsequently admitted for further evaluation and treatment. Cardiology consulted to assist with management. Patient seen and examined today, resting in bed with family in room. Feels ok. Denies any overt chest pain or tightness but family does report he has been complaining of "acid reflux" symptoms over the past few days. Patient is compliant with his medications. Followed on OP basis by Dr. Bueno. Troponin 0.623>1.671>3.887. H/H 9.5/28.5, recent FOBT negative. Echo pending. EKG reviewed, no acute ischemic changes appreciated. CT of abdomen with NAF.       6.24.2023  Trop trended down   Discussed with family ,   Bacteremic   Chest pain free , dementia   Daughter and family wants conservative management     6/25/2023  Looks ill   Daughter and son in law in room   Denies chest pain   Updated about bactermia , possible need for yandel, family is agreeable if needed, patient looking very fatigued     6/26/23-Patient seen " and examined today with daughter at bedside. Feels ok. Tired/weak. BP borderline. Repeat BC pending. Creatinine 1.6. H/H 7.9/23.5. STARLA requested by ID.    6/27/23-Patient seen and examined today. STARLA this AM showed highly mobile small vegetation attached on the root of anterior mitral valve leaflet, next to aortic root as well as some aortic root thickening. Feels ok. Still weak. Does admit to some GERD symptoms after eating. No SOB. Labs reviewed. Creatinine 1.5.           Review of Systems   Constitutional: Positive for malaise/fatigue.   HENT: Negative.     Eyes: Negative.    Cardiovascular: Negative.    Respiratory: Negative.     Endocrine: Negative.    Hematologic/Lymphatic: Negative.    Skin: Negative.    Musculoskeletal:  Positive for arthritis and joint pain.   Gastrointestinal: Negative.    Genitourinary: Negative.    Neurological: Negative.    Psychiatric/Behavioral: Negative.     Allergic/Immunologic: Negative.    Objective:     Vital Signs (Most Recent):  Temp: 97.7 °F (36.5 °C) (patient back from procedure) (06/27/23 0949)  Pulse: 67 (06/27/23 0949)  Resp: 18 (06/27/23 0949)  BP: 135/63 (06/27/23 0949)  SpO2: 96 % (06/27/23 0949) Vital Signs (24h Range):  Temp:  [97 °F (36.1 °C)-98.8 °F (37.1 °C)] 97.7 °F (36.5 °C)  Pulse:  [60-71] 67  Resp:  [17-20] 18  SpO2:  [95 %-100 %] 96 %  BP: (103-138)/(55-65) 135/63     Weight: 62.1 kg (137 lb)  Body mass index is 19.66 kg/m².     SpO2: 96 %         Intake/Output Summary (Last 24 hours) at 6/27/2023 1134  Last data filed at 6/26/2023 1549  Gross per 24 hour   Intake 148.96 ml   Output --   Net 148.96 ml       Lines/Drains/Airways       Peripheral Intravenous Line  Duration                  Peripheral IV - Single Lumen 06/26/23 1445 20 G Anterior;Left Forearm <1 day                       Physical Exam  Vitals and nursing note reviewed.   Constitutional:       General: He is not in acute distress.     Appearance: Normal appearance. He is well-developed. He is not  diaphoretic.   HENT:      Head: Normocephalic and atraumatic.   Eyes:      General:         Right eye: No discharge.         Left eye: No discharge.      Pupils: Pupils are equal, round, and reactive to light.   Neck:      Thyroid: No thyromegaly.      Vascular: No JVD.      Trachea: No tracheal deviation.   Cardiovascular:      Rate and Rhythm: Normal rate and regular rhythm.      Heart sounds: S1 normal and S2 normal. Murmur heard.   Pulmonary:      Effort: Pulmonary effort is normal. No respiratory distress.      Breath sounds: Normal breath sounds. No wheezing or rales.   Abdominal:      General: There is no distension.      Tenderness: There is no rebound.   Musculoskeletal:      Cervical back: Neck supple.      Right lower leg: No edema.      Left lower leg: No edema.   Skin:     General: Skin is warm and dry.      Findings: No erythema.   Neurological:      Mental Status: He is alert and oriented to person, place, and time.   Psychiatric:         Mood and Affect: Mood normal.         Behavior: Behavior normal.          Significant Labs: CMP   Recent Labs   Lab 06/26/23  0459 06/27/23  0512   * 135*   K 3.5 3.6    100   CO2 23 22*   GLU 99 86   BUN 37* 34*   CREATININE 1.6* 1.5*   CALCIUM 8.5* 9.1   ANIONGAP 10 13   , CBC   Recent Labs   Lab 06/26/23  0459   WBC 13.36*   HGB 7.9*   HCT 23.5*   *   , Troponin No results for input(s): TROPONINI in the last 48 hours., and All pertinent lab results from the last 24 hours have been reviewed.    Significant Imaging: Echocardiogram: Transthoracic echo (TTE) complete (Cupid Only):   Results for orders placed or performed during the hospital encounter of 06/22/23   Echo   Result Value Ref Range    BSA 1.86 m2    TDI SEPTAL 0.07 m/s    LV LATERAL E/E' RATIO 13.80 m/s    LV SEPTAL E/E' RATIO 19.71 m/s    LA WIDTH 3.80 cm    IVC diameter 2.16 cm    Left Ventricular Outflow Tract Mean Velocity 1.40 cm/s    Left Ventricular Outflow Tract Mean Gradient  9.33 mmHg    TV mean gradient 21 mmHg    TDI LATERAL 0.10 m/s    LVIDd 4.53 3.5 - 6.0 cm    IVS 1.49 (A) 0.6 - 1.1 cm    Posterior Wall 1.46 (A) 0.6 - 1.1 cm    Ao root annulus 2.24 cm    LVIDs 2.80 2.1 - 4.0 cm    FS 38 28 - 44 %    LA volume 66.25 cm3    STJ 2.37 cm    Ascending aorta 2.41 cm    LV mass 271.46 g    LA size 4.32 cm    TAPSE 1.80 cm    Left Ventricle Relative Wall Thickness 0.64 cm    AV mean gradient 24 mmHg    AV valve area 1.76 cm2    AV Velocity Ratio 0.63     AV index (prosthetic) 0.64     MV mean gradient 4 mmHg    MV valve area p 1/2 method 2.58 cm2    MV valve area by continuity eq 2.84 cm2    E/A ratio 1.07     Mean e' 0.09 m/s    E wave deceleration time 293.64 msec    IVRT 60.89 msec    LVOT diameter 1.88 cm    LVOT area 2.8 cm2    LVOT peak hira 1.87 m/s    LVOT peak VTI 45.90 cm    Ao peak hira 2.95 m/s    Ao VTI 72.2 cm    RVOT peak hira 0.84 m/s    RVOT peak VTI 17.1 cm    Mr max hira 5.06 m/s    LVOT stroke volume 127.35 cm3    AV peak gradient 35 mmHg    MV peak gradient 9 mmHg    PV mean gradient 1.71 mmHg    E/E' ratio 16.24 m/s    MV Peak E Hira 1.38 m/s    TR Max Hira 2.87 m/s    MV VTI 44.8 cm    MV stenosis pressure 1/2 time 85.16 ms    MV Peak A Hira 1.29 m/s    LV Systolic Volume 29.50 mL    LV Systolic Volume Index 15.8 mL/m2    LV Diastolic Volume 94.13 mL    LV Diastolic Volume Index 50.34 mL/m2    LA Volume Index 35.4 mL/m2    LV Mass Index 145 g/m2    RA Major Axis 3.68 cm    Left Atrium Minor Axis 4.85 cm    Left Atrium Major Axis 4.65 cm    Triscuspid Valve Regurgitation Peak Gradient 33 mmHg    RA Width 2.20 cm    Right Atrial Pressure (from IVC) 8 mmHg    EF 60 %    TV rest pulmonary artery pressure 41 mmHg    Narrative    · The left ventricle is normal in size with severe concentric hypertrophy   and normal systolic function.  · Mild left atrial enlargement.  · Grade II left ventricular diastolic dysfunction.  · The estimated PA systolic pressure is 41 mmHg.  · Normal  right ventricular size with normal right ventricular systolic   function.  · Intermediate central venous pressure (8 mmHg).  · The estimated ejection fraction is 60%.  · There is a bioprosthetic aortic valve present. There is no aortic   insufficiency present. Prosthetic aortic valve is normal.  · The aortic valve mean gradient is 24 mmHg with a dimensionless index of   0.64.  · Mild mitral regurgitation.  · Mild tricuspid regurgitation.  · There is pulmonary hypertension.      , EKG: Reviewed, and X-Ray: CXR: X-Ray Chest 1 View (CXR): No results found for this visit on 06/22/23. and X-Ray Chest PA and Lateral (CXR): No results found for this visit on 06/22/23.    Assessment and Plan:   Patient who presents with NSTEMI/bacteremia/endocarditis. Continue OMT. ID on board, on abx.     * Streptococcal endocarditis  6/25/2023   Awaiting cultures result   Pending ID input   Discussed with family concern for bacteremia in the presence of a prosthetic valve   Pending ID input   Possible need for STARLA if patient can tolerate     6/26/23  -STARLA requested by ID  -Keep NPO after MN    6/27/23  -STARLA showed highly mobile small vegetation attached on the root of anterior mitral valve leaflet, next to aortic root as well as aortic root wall thickening  -ID on board  -Continue abx     NSTEMI (non-ST elevated myocardial infarction)  -Presents with acid reflux type symptoms over the past few days  -Troponin 0.623>1.671>3.887, repeat pending  -Echo pending  -Continue ASA, statin, heparin gtt  -BP soft, will maximize regimen as tolerated  -Discussed invasive mgmt with [facility] once renal function stabilizes/improves, family/pt unsure about proceeding at present time. Continue OMT in interim.      6.24.2023  Asa, plavix and ppi on discharge  See HPI   Conservative management   Treated for bacteremia by hospital medicine , hx of valve, may need STARLA , will await for final cultures , discussed with      6/26/23  -Med mgmt  -Continue ASA, Plavix  as H/H permits  -Resume statin as tolerated  -BP too marginal for BB, etc    6/27/23  -Stable, continue OMT      MAGO (acute kidney injury)  -Likely in setting of IVVD/decreased appetite/on HCTZ as OP  -Recommend gentle IV hydration    6/26/23  -Creatinine improved since admission    Epigastric pain  -Unclear etiology  -CT of abdomen un-revealing  -Lipase normal  -Mgmt as per primary team    Primary hypertension  -BP soft  -Hold ARB/HCTZ given MAGO    Hyperlipidemia  -Statin    S/P AVR (aortic valve replacement)  -Reassess by echo        VTE Risk Mitigation (From admission, onward)         Ordered     IP VTE HIGH RISK PATIENT  Once         06/23/23 0334     Place sequential compression device  Until discontinued         06/23/23 0334                Renetta Rodriguez PA-C  Cardiology  [facility] - Tele[facility] ([facility])

## 2023-06-28 VITALS
BODY MASS INDEX: 19.61 KG/M2 | RESPIRATION RATE: 18 BRPM | OXYGEN SATURATION: 96 % | WEIGHT: 137 LBS | SYSTOLIC BLOOD PRESSURE: 125 MMHG | HEART RATE: 70 BPM | TEMPERATURE: 98 F | DIASTOLIC BLOOD PRESSURE: 65 MMHG | HEIGHT: 70 IN

## 2023-06-28 LAB
ANION GAP SERPL CALC-SCNC: 11 MMOL/L (ref 8–16)
BASOPHILS # BLD AUTO: 0.02 K/UL (ref 0–0.2)
BASOPHILS NFR BLD: 0.2 % (ref 0–1.9)
BUN SERPL-MCNC: 33 MG/DL (ref 8–23)
CALCIUM SERPL-MCNC: 8.6 MG/DL (ref 8.7–10.5)
CHLORIDE SERPL-SCNC: 103 MMOL/L (ref 95–110)
CO2 SERPL-SCNC: 24 MMOL/L (ref 23–29)
CREAT SERPL-MCNC: 1.6 MG/DL (ref 0.5–1.4)
DIFFERENTIAL METHOD: ABNORMAL
EOSINOPHIL # BLD AUTO: 0.1 K/UL (ref 0–0.5)
EOSINOPHIL NFR BLD: 1.2 % (ref 0–8)
ERYTHROCYTE [DISTWIDTH] IN BLOOD BY AUTOMATED COUNT: 12.9 % (ref 11.5–14.5)
EST. GFR  (NO RACE VARIABLE): 41 ML/MIN/1.73 M^2
GLUCOSE SERPL-MCNC: 97 MG/DL (ref 70–110)
HCT VFR BLD AUTO: 26 % (ref 40–54)
HGB BLD-MCNC: 8.7 G/DL (ref 14–18)
IMM GRANULOCYTES # BLD AUTO: 0.06 K/UL (ref 0–0.04)
IMM GRANULOCYTES NFR BLD AUTO: 0.7 % (ref 0–0.5)
LYMPHOCYTES # BLD AUTO: 1.9 K/UL (ref 1–4.8)
LYMPHOCYTES NFR BLD: 21.8 % (ref 18–48)
MCH RBC QN AUTO: 33 PG (ref 27–31)
MCHC RBC AUTO-ENTMCNC: 33.5 G/DL (ref 32–36)
MCV RBC AUTO: 99 FL (ref 82–98)
MONOCYTES # BLD AUTO: 1 K/UL (ref 0.3–1)
MONOCYTES NFR BLD: 11.7 % (ref 4–15)
NEUTROPHILS # BLD AUTO: 5.6 K/UL (ref 1.8–7.7)
NEUTROPHILS NFR BLD: 64.4 % (ref 38–73)
NRBC BLD-RTO: 0 /100 WBC
PLATELET # BLD AUTO: 159 K/UL (ref 150–450)
PMV BLD AUTO: 9.9 FL (ref 9.2–12.9)
POTASSIUM SERPL-SCNC: 3.8 MMOL/L (ref 3.5–5.1)
RBC # BLD AUTO: 2.64 M/UL (ref 4.6–6.2)
SODIUM SERPL-SCNC: 138 MMOL/L (ref 136–145)
WBC # BLD AUTO: 8.65 K/UL (ref 3.9–12.7)

## 2023-06-28 PROCEDURE — 25000003 PHARM REV CODE 250: Performed by: FAMILY MEDICINE

## 2023-06-28 PROCEDURE — 25000003 PHARM REV CODE 250: Performed by: NURSE PRACTITIONER

## 2023-06-28 PROCEDURE — 63600175 PHARM REV CODE 636 W HCPCS: Performed by: FAMILY MEDICINE

## 2023-06-28 PROCEDURE — 85025 COMPLETE CBC W/AUTO DIFF WBC: CPT | Performed by: FAMILY MEDICINE

## 2023-06-28 PROCEDURE — 80048 BASIC METABOLIC PNL TOTAL CA: CPT | Performed by: INTERNAL MEDICINE

## 2023-06-28 RX ORDER — CLOPIDOGREL BISULFATE 75 MG/1
75 TABLET ORAL DAILY
Qty: 30 TABLET | Refills: 0 | Status: SHIPPED | OUTPATIENT
Start: 2023-06-28

## 2023-06-28 RX ORDER — ESOMEPRAZOLE MAGNESIUM 40 MG/1
40 CAPSULE, DELAYED RELEASE ORAL
Qty: 30 CAPSULE | Refills: 0 | Status: SHIPPED | OUTPATIENT
Start: 2023-06-28

## 2023-06-28 RX ADMIN — SODIUM CHLORIDE: 9 INJECTION, SOLUTION INTRAVENOUS at 01:06

## 2023-06-28 RX ADMIN — ALUMINUM HYDROXIDE, MAGNESIUM HYDROXIDE, AND DIMETHICONE 30 ML: 200; 20; 200 SUSPENSION ORAL at 04:06

## 2023-06-28 RX ADMIN — TRAMADOL HYDROCHLORIDE 50 MG: 50 TABLET, COATED ORAL at 08:06

## 2023-06-28 RX ADMIN — LEVOTHYROXINE SODIUM 112 MCG: 0.11 TABLET ORAL at 05:06

## 2023-06-28 RX ADMIN — CEFTRIAXONE 2 G: 2 INJECTION, POWDER, FOR SOLUTION INTRAMUSCULAR; INTRAVENOUS at 01:06

## 2023-06-28 RX ADMIN — CLOPIDOGREL BISULFATE 75 MG: 75 TABLET ORAL at 08:06

## 2023-06-28 RX ADMIN — ASPIRIN 81 MG CHEWABLE TABLET 81 MG: 81 TABLET CHEWABLE at 08:06

## 2023-06-28 RX ADMIN — FERROUS SULFATE TAB 325 MG (65 MG ELEMENTAL FE) 1 EACH: 325 (65 FE) TAB at 08:06

## 2023-06-28 NOTE — PLAN OF CARE
[facility] - Tele[facility] (Hospital)  Discharge Final Note    Primary Care Provider: Devyn Beck MD    Expected Discharge Date: 6/28/2023    Final Discharge Note (most recent)       Final Note - 06/28/23 1448          Final Note    Assessment Type Final Discharge Note     Anticipated Discharge Disposition Home-Health Care [facility]     Hospital Resources/Appts/Education Provided Appointments scheduled by Navigator/Coordinator;Appointments scheduled and added to AVS        Post-Acute Status    Post-Acute Authorization Home Health;IV Infusion     Home Health Status Set-up Complete/Auth obtained   [facility]    IV Infusion Status Set-up Complete/Auth obtained   [facility]    Discharge Delays None known at this time                     Important Message from Medicare  Important Message from Medicare regarding Discharge Appeal Rights: Given to patient/caregiver, Explained to patient/caregiver, Signed/date by patient/caregiver     Date IMM was signed: 06/28/23  Time IMM was signed: 4515    Contact Info       Devyn Beck MD   Specialty: Internal Medicine   Relationship: PCP - General    [facility]  [address] [place] [id number]   Phone: [phone]       Next Steps: Go on 7/10/2023    Instructions: @ 9:30am [facility] follow up    Mathew Izquierdo MD, [facility]   Specialty: Infectious Diseases, Hospitalist    [address] [place] [id number]   Phone: [phone]       Next Steps: Follow up    Instructions: Dr. Izquierdo's office should call you with an appointment.    Nicho Stewart MD   Specialty: Interventional Cardiology, Cardiology    [address]  [place] [address]   Phone: [phone]       Next Steps: Follow up in 1 month(s)    [facility]   Specialty: Home Health Services    [address] [facility] [address]   Phone: [phone]       Next Steps: Follow up     Instructions: Home Health: Agency will call and schedule an appointment to visit day after discharge.    [facility]    [address] [place] [address]   Phone: [phone]       Next Steps: Follow up    Instructions: infusion company that will provide the IV antibiotics by delivering to your home

## 2023-06-28 NOTE — ASSESSMENT & PLAN NOTE
Source control is needed in all cases of strep bacteremia-  With prosthetic valve, will need STARLA  Continue ROCEPHIN     06/27- STARLA showed There is a bioprosthetic aortic valve present. There is no aortic insufficiency present. The arotic root is thickneing and no aortic root abscess noted.   There is small mobile anterior mitral leaflet root vegetation    Will use 6 weeks of once daily rocephin , will avoid Gentamicin due to MAGO

## 2023-06-28 NOTE — ASSESSMENT & PLAN NOTE
With bacteremia- will need STARLA to rule out endocarditis-  continue Rocephin    06/27- will plan to complete 6 weeks of Rocephin 2 gram daily as convinent OPT.  Due to MAGO, will avoid gentamicin

## 2023-06-28 NOTE — PLAN OF CARE
Accepted by [facility] and [facility].  [facility] will teach after lunch.       06/28/23 0959   Post-Acute Status   Post-Acute Authorization Home Health;IV Infusion   Home Health Status Set-up Complete/Auth obtained   Discharge Plan   Discharge Plan A Count includes the [facility]

## 2023-06-28 NOTE — PROGRESS NOTES
[facility])  Infectious Disease  Progress Note    Patient Name: Juan Castellanos  MRN: [id number]  Admission Date: 6/22/2023  Length of Stay: 3 days  Attending Physician: Cooper Zayas MD  Primary Care Provider: Devyn Beck MD    Isolation Status: No active isolations  Assessment/Plan:      Cardiac/Vascular  * Streptococcal endocarditis  Source control is needed in all cases of strep bacteremia-  With prosthetic valve, will need STARLA  Continue ROCEPHIN     06/27- STARLA showed There is a bioprosthetic aortic valve present. There is no aortic insufficiency present. The arotic root is thickneing and no aortic root abscess noted.   There is small mobile anterior mitral leaflet root vegetation    Will use 6 weeks of once daily rocephin , will avoid Gentamicin due to MAGO    NSTEMI (non-ST elevated myocardial infarction)  Cardiology follow up    S/P AVR (aortic valve replacement)  With bacteremia- will need STARLA to rule out endocarditis-  continue Rocephin    06/27- will plan to complete 6 weeks of Rocephin 2 gram daily as convinent OPT.  Due to MAGO, will avoid gentamicin     Renal/  MAGO (acute kidney injury)  Will monitor closely , avoid nephrotoxic meds        Anticipated Disposition:     Thank you for your consult. I will follow-up with patient. Please contact us if you have any additional questions.    Mathew Izquierdo MD, [facility]  Infectious Disease  [facility])    Subjective:     Principal Problem:Streptococcal endocarditis    HPI:   89 y.o. male with a PMH  has a past medical history of Allergy, Anxiety, Arthritis, Blood transfusion, Cataract, Depression, GERD (gastroesophageal reflux disease), Heart murmur, s/p AVR Hypercholesterolemia admitted with  epigastric/right upper quadrant abdominal pain .  Since admission-blood cultures done 06/22-    STREPTOCOCCUS ANGINOSUS      Echo showed-There is a bioprosthetic aortic valve present. There is no aortic insufficiency present. Prosthetic  aortic valve is normal.   The aortic valve mean gradient is 24 mmHg with a dimensionless index of 0.64  '    Interval History: 89 year old man with strep bacteremia.   He had STARLA today that showed  There is a bioprosthetic aortic valve present. There is no aortic insufficiency present. The arotic root is thickneing and no aortic root abscess noted.   There is small mobile anterior mitral leaflet root vegetation    Review of Systems-patient was in procedure room  Objective:     Vital Signs (Most Recent):  Temp: 98.4 °F (36.9 °C) (06/27/23 2339)  Pulse: 72 (06/28/23 0100)  Resp: 18 (06/27/23 2339)  BP: 124/61 (06/27/23 2339)  SpO2: 97 % (06/27/23 2339) Vital Signs (24h Range):  Temp:  [97.7 °F (36.5 °C)-98.9 °F (37.2 °C)] 98.4 °F (36.9 °C)  Pulse:  [60-80] 72  Resp:  [16-20] 18  SpO2:  [94 %-100 %] 97 %  BP: (102-135)/(52-65) 124/61     Weight: 62.1 kg (137 lb)  Body mass index is 19.66 kg/m².    Estimated Creatinine Clearance: 28.8 mL/min (A) (based on SCr of 1.5 mg/dL (H)).     Physical Exam     Significant Labs: Blood Culture:   Recent Labs   Lab 06/22/23  2250 06/22/23  2301 06/25/23  1221   LABBLOO Gram stain aer bottle: Gram positive cocci in chains resembling Strep  Gram stain kaila bottle: Gram positive cocci in chains resembling Strep  Results called to and read back by: Elvira Alicea  06/24/2023  13:10  STREPTOCOCCUS ANGINOSUS  For susceptibility see order #[id number]  * Gram stain kaila bottle: Gram positive cocci in chains resembling Strep  Results called to and read back by: Elvira Alicea  06/24/2023  13:10  STREPTOCOCCUS ANGINOSUS* No Growth to date  No Growth to date  No Growth to date  No Growth to date  No Growth to date  No Growth to date     BMP:   Recent Labs   Lab 06/26/23  0459 06/27/23  0512   GLU 99 86   * 135*   K 3.5 3.6    100   CO2 23 22*   BUN 37* 34*   CREATININE 1.6* 1.5*   CALCIUM 8.5* 9.1   MG 2.0  --      CBC:   Recent Labs   Lab 06/26/23  0459   WBC 13.36*    HGB 7.9*   HCT 23.5*   *     All pertinent labs within the past 24 hours have been reviewed.    Significant Imaging: I have reviewed all pertinent imaging results/findings within the past 24 hours.

## 2023-06-28 NOTE — ASSESSMENT & PLAN NOTE
Chronic, controlled  -Latest blood pressure and vitals reviewed-   Temp:  [98.3 °F (36.8 °C)-98.9 °F (37.2 °C)]   Pulse:  [59-80]   Resp:  [16-18]   BP: (102-124)/(52-61)   SpO2:  [94 %-97 %] .   -Home meds for hypertension were reviewed and noted below.   Hypertension Medications         -While in the hospital, will manage blood pressure as follows; Adjust home antihypertensive regimen as follows- hold HCTZ and losartan d/t hypotension and MAGO  -PRN anti-hypertensive medication if patient's BP > 160/100   -optimize pain management

## 2023-06-28 NOTE — DISCHARGE SUMMARY
[facility] - Telemetry ([facility])  [facility] Medicine  Discharge Summary      Patient Name: Juan Castellanos  MRN: [id number]  THADDEUS: [id number]  Patient Class: IP- Inpatient  Admission Date: 6/22/2023  Hospital Length of Stay: 3 days  Discharge Date and Time:  06/28/2023 10:53 AM  Attending Physician: Marciano Barker MD   Discharging Provider: Marciano Barker MD  Primary Care Provider: Devyn Beck MD    Primary Care Team: Networked reference to record PCT     HPI:   Juan Castellanos is a 89 y.o. male with a PMH  has a past medical history of Allergy, Anxiety, Arthritis, Blood transfusion, Cataract, Depression, GERD (gastroesophageal reflux disease), Heart murmur, Hypercholesterolemia, Primary hypertension (11/24/2021), Prostate cancer, and Thyroid disease.  Presented to the ER accompanied by family for evaluation of epigastric/right upper quadrant abdominal pain that radiated to his right lower quadrant/umbilical region.  Patient reports he was sitting down watching that she baseball game and reports that the pain started all of a sudden.  Patient believed it was his acid reflux symptoms.  Patient states he took Fanny-Oberon without any relief of his symptoms.  Only reported cardiac history was aortic valve replacement.  Denies any stents or history of mi/bypass.  Currently asymptomatic and denies any other associated symptoms accompanied with abdominal pain.  ER workup showed BUN/creatinine of 30/2.0 (previously 16/1.3), , troponin of 0.623, and EKG showed sinus tach with rate of 102 QT/QTC of 350/456.  Remainder of workup was unremarkable.  Patient received total of 40 Protonix and GI cocktail in ED.  Hospital Medicine consulted to admit patient for cardiac workup.  Patient family are in agreement with treatment plan.  Patient will be admitted under observation status.  PCP: Devyn Beck        Procedure(s) (LRB):  ECHOCARDIOGRAM, TRANSESOPHAGEAL (N/A)      Hospital Course:   89 y/o male admitted with  abdominal pain, LUQ and radiates across the top, exacerbated after he eats, with no improvement after taking rosa-seltzer. Troponin was bumped on admission and continued to trend up. Started on heparin gtt nomogram. Cr 1.9 (near his baseline of 1.6-1.9). Cardiology consulted for assistance. BC obtained on admission, growing strep, started on Rocephin IV (6/24), repeat BC 6/25, pending.  Echo EF 60%, grade II LV diastolic dysfunction, mild MR, TR with pulmonary hypertension. EKG S tachy.  6/26: cards on case. STARLA tomorrow. Repeat blood cultures NG. Cont rocephin.   6/27: STARLA positive for small highly mobile vegetation attached at the root of anterior mitral valve leaflet close to the aortic root. Repeat blood cultures negative. Increase rocephin to 2g q12h. Will discuss with ID on addition of gentamicin x 2 weeks. Picc line ordered. Will need 6 weeks IV abx therapy.   6/28: Patient stable for discharge on 6 weeks of Rocephin IV daily, No Gentamicin due to renal function per ID.  HH and infusion company set up.  Case dw Dr. Izquierdo.  Plan for DC with follow up with ID, cardiology and PCP.  POC dw patient and daughter at bedside.       Goals of Care Treatment Preferences:  Code Status: DNR      Consults:   Consults (From admission, onward)        Status Ordering Provider     Inpatient consult to Social Work  Once        Provider:  (Not yet assigned)    Acknowledged YOLANDA SHARPE     Inpatient consult to Social Work  Once        Provider:  (Not yet assigned)    Acknowledged ROSARIO SOLANO     Inpatient consult to Infectious Diseases  Once        Provider:  Mathew Izquierdo MD, FID    Acknowledged ARLINE HALEY     Inpatient consult to Cardiology  Once        Provider:  Jonatan Valencia MD    Completed AMANDA ORTIZ          Cardiac/Vascular  * Streptococcal endocarditis  -BC growing strep, final pending  -repeat BC today  -cont Rocephin IV  -UA and CXR negative, afebrile, unclear etiology?  -consult ID for  assistance, patient has h/o artifical heart valve, may need STARLA?    Strep bacteremia noted  STARLA positive for endocarditis  Will increase rocephin to 2g daily  Will need 6 weeks of Rocephin from 6/25 (first negative culture)  PICC line left arm  HH consulted with infusion company ([facility])    NSTEMI (non-ST elevated myocardial infarction)  -tele monitoring  -troponin trend 0.623 > 1.671 > 3.887 > 5.956 > 5.020  -cont ASA/Plavix (heparin gtt stopped 6/24)  -Follow up cards as an op.  Patient seen by Dr. Stewart here.    Cont medical management       Primary hypertension  Chronic, controlled  -Latest blood pressure and vitals reviewed-   Temp:  [98.3 °F (36.8 °C)-98.9 °F (37.2 °C)]   Pulse:  [59-80]   Resp:  [16-18]   BP: (102-124)/(52-61)   SpO2:  [94 %-97 %] .   -Home meds for hypertension were reviewed and noted below.   Hypertension Medications         -While in the hospital, will manage blood pressure as follows; Adjust home antihypertensive regimen as follows- hold HCTZ and losartan d/t hypotension and MAGO  -PRN anti-hypertensive medication if patient's BP > 160/100   -optimize pain management    Hyperlipidemia  Patient is chronically on statin.will continue for now. Last Lipid Panel:   Lab Results   Component Value Date    CHOL 157 06/05/2019    HDL 45 06/05/2019    LDLCALC 69.2 06/05/2019    TRIG 214 (H) 06/05/2019    CHOLHDL 28.7 06/05/2019       S/P AVR (aortic valve replacement)  -Echo showed patent valve  -follows Dr. Crane OP, will need follow up   -ID consulted with bacteremia and h/o heart valve  -STARLA: There is small mobile anterior mitral leaflet root vegetation.      Renal/  MAGO (acute kidney injury)  Patient with acute kidney injury likely due to IVVD/dehydration and pre-renal azotemia MAGO is currently being treated. Labs reviewed- Renal function/electrolytes with Estimated Creatinine Clearance: 27 mL/min (A) (based on SCr of 1.6 mg/dL (H)). according to latest data. Monitor urine output and  serial BMP and adjust therapy as needed. Avoid nephrotoxins and renally dose meds for GFR listed above.   -improved after 500 ml bolus given 6/24  -cont to hold HCTZ and losartan    Stable   Appears to be at baseline (1.6)  Follow up with PCP upon discharge for close monitoring    Endocrine  Hypothyroidism  Patient has chronic hypothyroidism. TFTs reviewed-   Lab Results   Component Value Date    TSH 4.0 10/11/2007   -Will continue chronic levothyroxine and adjust for and clinical changes    GI  Epigastric pain  CT abdomen pelvis without acute findings.  Asymptomatic at this time.  -analgesics prn  -continue home PPI/antiacids  -denies any further pain      Final Active Diagnoses:    Diagnosis Date Noted POA    PRINCIPAL PROBLEM:  Streptococcal endocarditis [I33.0, B95.5] 06/24/2023 Yes    Epigastric pain [R10.13] 06/23/2023 Yes    MAGO (acute kidney injury) [N17.9] 06/23/2023 Yes    GERD (gastroesophageal reflux disease) [K21.9] 06/23/2023 Yes    NSTEMI (non-ST elevated myocardial infarction) [I21.4] 06/23/2023 Yes    Primary hypertension [I10] 11/24/2021 Yes    Hyperlipidemia [E78.5] 04/07/2014 Yes    S/P AVR (aortic valve replacement) [Z95.2] 04/07/2014 Not Applicable    Hypothyroidism [E03.9] 10/15/2012 Yes      Problems Resolved During this Admission:    Diagnosis Date Noted Date Resolved POA    Elevated troponin [R77.8] 06/23/2023 06/27/2023 Yes       Discharged Condition: stable    Disposition: Home or Self Care    Follow Up:   Follow-up Information     Devyn Beck MD Follow up in 3 day(s).    Specialty: Internal Medicine  Contact information:  [address] [facility] [address] [phone]             Mathew Izquierdo MD, FIDSA Follow up.    Specialties: Infectious Diseases, Hospitalist  Contact information:  [address] [facility] [address] [phone]             Nicho Stewart MD Follow up in 1 month(s).    Specialties: Interventional Cardiology, Cardiology  Contact  information:  [address] [facility]  [place] [address]  [phone]                       Patient Instructions:      Ambulatory referral/consult to Home Health   Standing Status: Future   Referral Priority: Routine Referral Type: Home Health Care   Referral Reason: Specialty Services Required   Requested Specialty: Home Health Services   Number of Visits Requested: 1     Ambulatory referral/consult to [facility]   Standing Status: Future   Referral Priority: Routine Referral Type: Consultation   Referral Reason: Specialty Services Required   Number of Visits Requested: 1     Diet Cardiac     Notify your health care provider if you experience any of the following:  temperature >100.4     Notify your health care provider if you experience any of the following:  severe uncontrolled pain     Notify your health care provider if you experience any of the following:  redness, tenderness, or signs of infection (pain, swelling, redness, odor or green/yellow discharge around incision site)     Activity as tolerated       Significant Diagnostic Studies: Labs:   BMP:   Recent Labs   Lab 06/27/23  0512 06/28/23  0447   GLU 86 97   * 138   K 3.6 3.8    103   CO2 22* 24   BUN 34* 33*   CREATININE 1.5* 1.6*   CALCIUM 9.1 8.6*   , CMP   Recent Labs   Lab 06/27/23  0512 06/28/23  0447   * 138   K 3.6 3.8    103   CO2 22* 24   GLU 86 97   BUN 34* 33*   CREATININE 1.5* 1.6*   CALCIUM 9.1 8.6*   ANIONGAP 13 11   , CBC   Recent Labs   Lab 06/28/23  0447   WBC 8.65   HGB 8.7*   HCT 26.0*      , Lipid Panel   Lab Results   Component Value Date    CHOL 157 06/05/2019    HDL 45 06/05/2019    LDLCALC 69.2 06/05/2019    TRIG 214 (H) 06/05/2019    CHOLHDL 28.7 06/05/2019   , Troponin   Recent Labs   Lab 06/23/23  0632 06/23/23  0955 06/23/23  2048   TROPONINI 3.887* 5.956* 5.020*    and A1C: No results for input(s): HGBA1C in the last 4320 hours.  Microbiology:   Blood Culture   Lab Results    Component Value Date    LABBLOO No Growth to date 06/25/2023    LABBLOO No Growth to date 06/25/2023    LABBLOO No Growth to date 06/25/2023    LABBLOO No Growth to date 06/25/2023    LABBLOO No Growth to date 06/25/2023    LABBLOO No Growth to date 06/25/2023     Radiology:   X-Ray Chest 1 View   Final Result      No acute abnormality.         Electronically signed by: Adam Lara   Date:    06/27/2023   Time:    17:15      CT Abdomen Pelvis  Without Contrast   Final Result      No acute intra-abdominal findings.  See findings above.      All CT scans at this facility use dose modulation, iterative reconstruction, and/or weight based dosing when appropriate to reduce radiation dose to as low as reasonable achievable.         Electronically signed by: Cal Castorena MD   Date:    06/23/2023   Time:    07:06      X-Ray Chest AP Portable   Final Result      No acute abnormality.         Electronically signed by: Hugo Garcia   Date:    06/22/2023   Time:    22:54          Pending Diagnostic Studies:     Procedure Component Value Units Date/Time    Echo [[id number]]     Order Status: Sent Lab Status: No result          Medications:  Reconciled Home Medications:      Medication List      START taking these medications    clopidogreL 75 mg tablet  Commonly known as: PLAVIX  Take 1 tablet (75 mg total) by mouth once daily.     dextrose 5 % in water (D5W) 5 % PgBk 100 mL with cefTRIAXone 2 gram SolR 2 g  Inject 2 g into the vein once daily.        CONTINUE taking these medications    aspirin 81 MG EC tablet  Commonly known as: ECOTRIN  Take 81 mg by mouth once daily.     cholecalciferol (vitamin D3) 25 mcg (1,000 unit) capsule  Commonly known as: VITAMIN D3  Take 1,000 Units by mouth 2 (two) times daily.     esomeprazole 40 MG capsule  Commonly known as: NEXIUM  Take 1 capsule (40 mg total) by mouth before dinner.     ferrous gluconate 324 MG tablet  Commonly known as: FERGON  Take 324 mg by mouth daily with  breakfast.     fluticasone propionate 50 mcg/actuation nasal spray  Commonly known as: FLONASE  1 spray by Each Nare route once daily.     hydrocortisone 2.5 % cream  Apply topically 2 (two) times daily.     levothyroxine 112 MCG tablet  Commonly known as: SYNTHROID  Take 1 tablet by mouth every morning.     multivit-iron-min-folic acid 3,500-18-0.4 unit-mg-mg Chew  Take 1 tablet by mouth once daily.     rosuvastatin 20 MG tablet  Commonly known as: CRESTOR  Take 20 mg by mouth every evening.     traMADoL 50 mg tablet  Commonly known as: ULTRAM  Take 50 mg by mouth every 6 (six) hours as needed.        STOP taking these medications    hydroCHLOROthiazide 12.5 mg capsule  Commonly known as: MICROZIDE     losartan 25 MG tablet  Commonly known as: COZAAR            Indwelling Lines/Drains at time of discharge:   Lines/Drains/Airways     Peripherally Inserted Central Catheter Line  Duration           PICC Double Lumen 06/27/23 1559 left brachial <1 day                Time spent on the discharge of patient: 45 minutes         Marciano Barker MD  Department of Hospital Medicine  [facility] - Telemetry ([facility])

## 2023-06-28 NOTE — ASSESSMENT & PLAN NOTE
-Echo showed patent valve  -follows Dr. Crane OP, will need follow up   -ID consulted with bacteremia and h/o heart valve  -STARLA: There is small mobile anterior mitral leaflet root vegetation.

## 2023-06-28 NOTE — SUBJECTIVE & OBJECTIVE
Interval History: 89 year old man with strep bacteremia.  He had STARLA today that showed  There is a bioprosthetic aortic valve present. There is no aortic insufficiency present. The arotic root is thickneing and no aortic root abscess noted.  There is small mobile anterior mitral leaflet root vegetation    Review of Systems  Objective:     Vital Signs (Most Recent):  Temp: 98.4 °F (36.9 °C) (06/27/23 2339)  Pulse: 72 (06/28/23 0100)  Resp: 18 (06/27/23 2339)  BP: 124/61 (06/27/23 2339)  SpO2: 97 % (06/27/23 2339) Vital Signs (24h Range):  Temp:  [97.7 °F (36.5 °C)-98.9 °F (37.2 °C)] 98.4 °F (36.9 °C)  Pulse:  [60-80] 72  Resp:  [16-20] 18  SpO2:  [94 %-100 %] 97 %  BP: (102-135)/(52-65) 124/61     Weight: 62.1 kg (137 lb)  Body mass index is 19.66 kg/m².    Estimated Creatinine Clearance: 28.8 mL/min (A) (based on SCr of 1.5 mg/dL (H)).     Physical Exam     Significant Labs: Blood Culture:   Recent Labs   Lab 06/22/23  2250 06/22/23  2301 06/25/23  1221   LABBLOO Gram stain aer bottle: Gram positive cocci in chains resembling Strep  Gram stain kaila bottle: Gram positive cocci in chains resembling Strep  Results called to and read back by: Elvira Alicea  06/24/2023  13:10  STREPTOCOCCUS ANGINOSUS  For susceptibility see order #[id number]  * Gram stain kaila bottle: Gram positive cocci in chains resembling Strep  Results called to and read back by: Elvira Alicea  06/24/2023  13:10  STREPTOCOCCUS ANGINOSUS* No Growth to date  No Growth to date  No Growth to date  No Growth to date  No Growth to date  No Growth to date     BMP:   Recent Labs   Lab 06/26/23  0459 06/27/23  0512   GLU 99 86   * 135*   K 3.5 3.6    100   CO2 23 22*   BUN 37* 34*   CREATININE 1.6* 1.5*   CALCIUM 8.5* 9.1   MG 2.0  --      CBC:   Recent Labs   Lab 06/26/23  0459   WBC 13.36*   HGB 7.9*   HCT 23.5*   *     All pertinent labs within the past 24 hours have been reviewed.    Significant Imaging: I have reviewed all  pertinent imaging results/findings within the past 24 hours.

## 2023-06-28 NOTE — PLAN OF CARE
Pt AAOx4. NSR on the heart monitor. On room air; tolerating well. Patient voids spontaneously without difficulty. Pt turned and repositioned with use of pillows. PIV CDI with no redness, swelling, warmth, or drainage. Bed low, wheels locked, alarms audible. Plan of care reviewed. Vital signs monitored. Fall precautions in place. Pain assessed. Safety promoted. Infection risks reduced.       Problem: Adult Inpatient Plan of Care  Goal: Plan of Care Review  Outcome: Ongoing, Progressing  Goal: Patient-Specific Goal (Individualized)  Outcome: Ongoing, Progressing  Goal: Absence of Hospital-Acquired Illness or Injury  Outcome: Ongoing, Progressing  Goal: Optimal Comfort and Wellbeing  Outcome: Ongoing, Progressing  Goal: Readiness for Transition of Care  Outcome: Ongoing, Progressing     Problem: Fluid and Electrolyte Imbalance (Acute Kidney Injury/Impairment)  Goal: Fluid and Electrolyte Balance  Outcome: Ongoing, Progressing     Problem: Oral Intake Inadequate (Acute Kidney Injury/Impairment)  Goal: Optimal Nutrition Intake  Outcome: Ongoing, Progressing     Problem: Renal Function Impairment (Acute Kidney Injury/Impairment)  Goal: Effective Renal Function  Outcome: Ongoing, Progressing     Problem: Pain Acute  Goal: Acceptable Pain Control and Functional Ability  Outcome: Ongoing, Progressing     Problem: Fall Injury Risk  Goal: Absence of Fall and Fall-Related Injury  Outcome: Ongoing, Progressing     Problem: Infection  Goal: Absence of Infection Signs and Symptoms  Outcome: Ongoing, Progressing

## 2023-06-28 NOTE — PLAN OF CARE
Okay to discharge patient now per MD and .   Went over discharge instructions with patient and his daughter.   Stressed importance of making and keeping all follow ups as well as making prescribed medication changes.   Prescriptions sent to pt's requested pharmacy.  Patient's daughter verbalized that she had been educated by [facility] and she understood how to administer IV antibiotics.   Patient and his daughter verbalized understanding and have had all questions in regards to discharge answered to satisfaction.  IV removed without complications.  PICC line remains in place per MD for infusion of home IV antibiotics.   Telemetry box removed and returned to monitor tech.  Patient transported via wheelchair to personal transportation at main entrance.

## 2023-06-28 NOTE — ASSESSMENT & PLAN NOTE
Patient with acute kidney injury likely due to IVVD/dehydration and pre-renal azotemia MAGO is currently being treated. Labs reviewed- Renal function/electrolytes with Estimated Creatinine Clearance: 27 mL/min (A) (based on SCr of 1.6 mg/dL (H)). according to latest data. Monitor urine output and serial BMP and adjust therapy as needed. Avoid nephrotoxins and renally dose meds for GFR listed above.   -improved after 500 ml bolus given 6/24  -cont to hold HCTZ and losartan    Stable   Appears to be at baseline (1.6)  Follow up with PCP upon discharge for close monitoring

## 2023-06-28 NOTE — ASSESSMENT & PLAN NOTE
-tele monitoring  -troponin trend 0.623 > 1.671 > 3.887 > 5.956 > 5.020  -cont ASA/Plavix (heparin gtt stopped 6/24)  -Follow up cards as an op.  Patient seen by Dr. Stewart here.    Cont medical management

## 2023-06-28 NOTE — ASSESSMENT & PLAN NOTE
-BC growing strep, final pending  -repeat BC today  -cont Rocephin IV  -UA and CXR negative, afebrile, unclear etiology?  -consult ID for assistance, patient has h/o artifical heart valve, may need STARLA?    Strep bacteremia noted  STARLA positive for endocarditis  Will increase rocephin to 2g daily  Will need 6 weeks of Rocephin from 6/25 (first negative culture)  PICC line left arm  HH consulted with infusion company ([facility])

## 2023-06-29 ENCOUNTER — PES CALL (OUTPATIENT)
Dept: ADMINISTRATIVE | Facility: CLINIC | Age: 88
End: 2023-06-29
Payer: MEDICARE

## 2023-06-30 ENCOUNTER — PES CALL (OUTPATIENT)
Dept: ADMINISTRATIVE | Facility: CLINIC | Age: 88
End: 2023-06-30
Payer: MEDICARE

## 2023-06-30 LAB
BACTERIA BLD CULT: NORMAL
BACTERIA BLD CULT: NORMAL

## 2023-07-03 ENCOUNTER — LAB VISIT (OUTPATIENT)
Dept: LAB | Facility: HOSPITAL | Age: 88
End: 2023-07-03
Attending: INTERNAL MEDICINE
Payer: MEDICARE

## 2023-07-03 DIAGNOSIS — I10 ESSENTIAL HYPERTENSION, MALIGNANT: ICD-10-CM

## 2023-07-03 LAB
ANION GAP SERPL CALC-SCNC: 13 MMOL/L (ref 8–16)
BASOPHILS # BLD AUTO: 0.02 K/UL (ref 0–0.2)
BASOPHILS NFR BLD: 0.3 % (ref 0–1.9)
BUN SERPL-MCNC: 24 MG/DL (ref 8–23)
CALCIUM SERPL-MCNC: 8.5 MG/DL (ref 8.7–10.5)
CHLORIDE SERPL-SCNC: 104 MMOL/L (ref 95–110)
CO2 SERPL-SCNC: 24 MMOL/L (ref 23–29)
CREAT SERPL-MCNC: 1.4 MG/DL (ref 0.5–1.4)
DIFFERENTIAL METHOD: ABNORMAL
EOSINOPHIL # BLD AUTO: 0.1 K/UL (ref 0–0.5)
EOSINOPHIL NFR BLD: 0.9 % (ref 0–8)
ERYTHROCYTE [DISTWIDTH] IN BLOOD BY AUTOMATED COUNT: 13.5 % (ref 11.5–14.5)
EST. GFR  (NO RACE VARIABLE): 48 ML/MIN/1.73 M^2
GLUCOSE SERPL-MCNC: 100 MG/DL (ref 70–110)
HCT VFR BLD AUTO: 24.8 % (ref 40–54)
HGB BLD-MCNC: 8 G/DL (ref 14–18)
IMM GRANULOCYTES # BLD AUTO: 0.04 K/UL (ref 0–0.04)
IMM GRANULOCYTES NFR BLD AUTO: 0.5 % (ref 0–0.5)
LYMPHOCYTES # BLD AUTO: 1 K/UL (ref 1–4.8)
LYMPHOCYTES NFR BLD: 11.9 % (ref 18–48)
MCH RBC QN AUTO: 33.1 PG (ref 27–31)
MCHC RBC AUTO-ENTMCNC: 32.3 G/DL (ref 32–36)
MCV RBC AUTO: 103 FL (ref 82–98)
MONOCYTES # BLD AUTO: 0.5 K/UL (ref 0.3–1)
MONOCYTES NFR BLD: 6.6 % (ref 4–15)
NEUTROPHILS # BLD AUTO: 6.4 K/UL (ref 1.8–7.7)
NEUTROPHILS NFR BLD: 79.8 % (ref 38–73)
NRBC BLD-RTO: 0 /100 WBC
PLATELET # BLD AUTO: 323 K/UL (ref 150–450)
PMV BLD AUTO: 9.9 FL (ref 9.2–12.9)
POTASSIUM SERPL-SCNC: 4.8 MMOL/L (ref 3.5–5.1)
RBC # BLD AUTO: 2.42 M/UL (ref 4.6–6.2)
SODIUM SERPL-SCNC: 141 MMOL/L (ref 136–145)
WBC # BLD AUTO: 8 K/UL (ref 3.9–12.7)

## 2023-07-03 PROCEDURE — 80048 BASIC METABOLIC PNL TOTAL CA: CPT | Performed by: INTERNAL MEDICINE

## 2023-07-03 PROCEDURE — 85025 COMPLETE CBC W/AUTO DIFF WBC: CPT | Performed by: INTERNAL MEDICINE

## 2023-07-11 ENCOUNTER — CARE AT HOME (OUTPATIENT)
Dept: HOME HEALTH SERVICES | Facility: CLINIC | Age: 88
End: 2023-07-11
Payer: MEDICARE

## 2023-07-11 VITALS
SYSTOLIC BLOOD PRESSURE: 124 MMHG | OXYGEN SATURATION: 98 % | HEART RATE: 67 BPM | DIASTOLIC BLOOD PRESSURE: 58 MMHG | RESPIRATION RATE: 18 BRPM

## 2023-07-11 DIAGNOSIS — I10 PRIMARY HYPERTENSION: ICD-10-CM

## 2023-07-11 DIAGNOSIS — I21.4 NSTEMI (NON-ST ELEVATED MYOCARDIAL INFARCTION): ICD-10-CM

## 2023-07-11 DIAGNOSIS — Z09 HOSPITAL DISCHARGE FOLLOW-UP: Primary | ICD-10-CM

## 2023-07-11 DIAGNOSIS — I33.0 STREPTOCOCCAL ENDOCARDITIS: ICD-10-CM

## 2023-07-11 DIAGNOSIS — N17.9 AKI (ACUTE KIDNEY INJURY): ICD-10-CM

## 2023-07-11 DIAGNOSIS — B95.5 STREPTOCOCCAL ENDOCARDITIS: ICD-10-CM

## 2023-07-11 PROCEDURE — 1111F DSCHRG MED/CURRENT MED MERGE: CPT | Mod: CPTII,S$GLB,,

## 2023-07-11 PROCEDURE — 99349 PR HOME VISIT,ESTAB PATIENT,LEVEL III: ICD-10-PCS | Mod: S$GLB,,,

## 2023-07-11 PROCEDURE — 1111F PR DISCHARGE MEDS RECONCILED W/ CURRENT OUTPATIENT MED LIST: ICD-10-PCS | Mod: CPTII,S$GLB,,

## 2023-07-11 PROCEDURE — 99349 HOME/RES VST EST MOD MDM 40: CPT | Mod: S$GLB,,,

## 2023-07-11 NOTE — PROGRESS NOTES
[facility]  Transition of Care Home Visit    Visit Date: 7/11/2023  Encounter Provider: Willie Leigh   PCP:  Devyn Beck MD    PRESENTING HISTORY      Patient ID: Juan Castellanos is a 89 y.o. male.    Consult Requested By:  Elvira Yu  Reason for Consult:  Hospital Follow Up.    Juan is being seen at home due to being seen at home due to physical debility that presents a taxing effort to leave the home, to mitigate high risk of hospital readmission and/or due to the limited availability of reliable or safe options for transportation to the point of access to the provider. Prior to treatment on this visit the chart was reviewed and patient verbal consent was obtained.      Chief Complaint: Follow-up (Recent hospital encounter)        History of Present Illness: Mr. Juan Castellanos is a 89 y.o. male who was recently admitted to the hospital.    6/23-6/28 admission with medical diagnoses including Allergy, Anxiety, Arthritis, Blood transfusion, Cataract, Depression, GERD (gastroesophageal reflux disease), Heart murmur, Hypercholesterolemia, Primary hypertension (11/24/2021), Prostate cancer, and Thyroid disease.  Presented to the ER accompanied by family for evaluation of epigastric/right upper quadrant abdominal pain that radiated to his right lower quadrant/umbilical region.  Patient reports he was sitting down watching that she baseball game and reports that the pain started all of a sudden.  Patient believed it was his acid reflux symptoms.  Patient states he took Fanny-Morris without any relief of his symptoms.  Only reported cardiac history was aortic valve replacement.  Denies any stents or history of mi/bypass.  Currently asymptomatic and denies any other associated symptoms accompanied with abdominal pain.  ER workup showed BUN/creatinine of 30/2.0 (previously 16/1.3), , troponin of 0.623, and EKG showed sinus tach with rate of 102 QT/QTC of 350/456.  Remainder of workup was  unremarkable.  Patient received total of 40 Protonix and GI cocktail in ED.  Hospital Medicine consulted to admit patient for cardiac workup.  Patient family are in agreement with treatment plan.  Patient will be admitted under observation status.  PCP: Devyn Beck           Procedure(s) (LRB):  ECHOCARDIOGRAM, TRANSESOPHAGEAL (N/A)       Hospital Course:   89 y/o male admitted with abdominal pain, LUQ and radiates across the top, exacerbated after he eats, with no improvement after taking rosa-seltzer. Troponin was bumped on admission and continued to trend up. Started on heparin gtt nomogram. Cr 1.9 (near his baseline of 1.6-1.9). Cardiology consulted for assistance. BC obtained on admission, growing strep, started on Rocephin IV (6/24), repeat BC 6/25, pending.  Echo EF 60%, grade II LV diastolic dysfunction, mild MR, TR with pulmonary hypertension. EKG S tachy.  6/26: cards on case. STARLA tomorrow. Repeat blood cultures NG. Cont rocephin.   6/27: STARLA positive for small highly mobile vegetation attached at the root of anterior mitral valve leaflet close to the aortic root. Repeat blood cultures negative. Increase rocephin to 2g q12h. Will discuss with ID on addition of gentamicin x 2 weeks. Picc line ordered. Will need 6 weeks IV abx therapy.   6/28: Patient stable for discharge on 6 weeks of Rocephin IV daily, No Gentamicin due to renal function per ID.  HH and infusion company set up.  Case dw Dr. Izquierdo.  Plan for DC with follow up with ID, cardiology and PCP.  POC dw patient and daughter at bedside.  ___________________________________________________________________    Today:    HPI:  Patient is a 89 y.o. male being seen for a follow up to recent hospital discharge. Upon visit today patient was found sitting on his couch in no acute distress.  He had just finished walking prior to visit. His daughter is present during visit.  His vital signs are stable, AAOx3. 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 He has LUE PICC line in place and is currently receiving IV Rocephin daily for 6 weeks. Reports some weakness which is improving since discharge.  He is working with PT. He reports feeling much better since discharge.  Has some 1 + edema to BLE. Reports compliance with all medications.  Reports no further complaints or concerns at this time.  Questions elicited. Time allowed for questions, all issues addressed. Contact info given for any concerns or changes. He had hospital follow up with PCP on 7/10. He has follow up with cardiology on 7/17.          Review of Systems   Constitutional: Negative.    HENT: Negative.     Eyes: Negative.    Respiratory: Negative.  Negative for chest tightness.    Cardiovascular:  Positive for leg swelling.   Gastrointestinal: Negative.    Endocrine: Negative.    Genitourinary: Negative.    Musculoskeletal: Negative.    Skin: Negative.    Allergic/Immunologic: Negative.    Neurological:  Positive for weakness.   Hematological: Negative.    Psychiatric/Behavioral: Negative.  Negative for agitation.    All other systems reviewed and are negative.    Assessments:  Environmental: Patient lives in a single story home with daughter.  There is adequate lighting and the temperature is comfortable.    Functional Status: Ambulates independently. Working with PT  Safety: Fall precautions.   Nutritional: Adequate food in the home.   Home Health/DME/Supplies: [facility]    PAST HISTORY:     Past Medical History:   Diagnosis Date    Allergy     Anxiety     Arthritis     Blood transfusion     Cataract     Depression     GERD (gastroesophageal reflux disease)     Heart murmur     Hypercholesterolemia     Primary hypertension 11/24/2021    Prostate cancer     Thyroid disease        Past Surgical History:   Procedure Laterality Date    ABDOMINAL HERNIA REPAIR Right     AORTIC VALVE REPLACEMENT      BASAL CELL CARCINOMA EXCISION Right 2022    CARDIAC VALVE  SURGERY      PROSTATECTOMY      TRANSESOPHAGEAL ECHOCARDIOGRAPHY N/A 6/27/2023    Procedure: ECHOCARDIOGRAM, TRANSESOPHAGEAL;  Surgeon: Juan Crane MD;  Location: [facility];  Service: Cardiology;  Laterality: N/A;       Family History   Problem Relation Age of Onset    Cataracts Father     Diabetes Brother        Social History     Socioeconomic History    Marital status:    Tobacco Use    Smoking status: Former    Smokeless tobacco: Never    Tobacco comments:     quit 40 years ago   Substance and Sexual Activity    Alcohol use: No    Drug use: No    Sexual activity: Not Currently       MEDICATIONS & ALLERGIES:     Current Outpatient Medications on File Prior to Visit   Medication Sig Dispense Refill    aspirin (ECOTRIN) 81 MG EC tablet Take 81 mg by mouth once daily.      cholecalciferol, vitamin D3, 1,000 unit capsule Take 1,000 Units by mouth 2 (two) times daily.      clopidogreL (PLAVIX) 75 mg tablet Take 1 tablet (75 mg total) by mouth once daily. 30 tablet 0    dextrose 5 % in water (D5W) 5 % PgBk 100 mL with cefTRIAXone 2 gram SolR 2 g Inject 2 g into the vein once daily.      esomeprazole (NEXIUM) 40 MG capsule Take 1 capsule (40 mg total) by mouth before dinner. 30 capsule 0    ferrous gluconate (FERGON) 324 MG tablet Take 324 mg by mouth daily with breakfast.      fluticasone (FLONASE) 50 mcg/actuation nasal spray 1 spray by Each Nare route once daily.      hydrocortisone 2.5 % cream Apply topically 2 (two) times daily.      levothyroxine (SYNTHROID) 112 MCG tablet Take 1 tablet by mouth every morning.      MULTIVIT-IRON-MIN-FOLIC ACID 3,500-18-0.4 UNIT-MG-MG ORAL CHEW Take 1 tablet by mouth once daily.      rosuvastatin (CRESTOR) 20 MG tablet Take 20 mg by mouth every evening.      tramadol (ULTRAM) 50 mg tablet Take 50 mg by mouth every 6 (six) hours as needed.       No current facility-administered medications on file prior to visit.        Review of patient's allergies indicates:   Allergen  Reactions    Tomato (solanum lycopersicum)     Grape Rash    Orange Rash       OBJECTIVE:     Vital Signs:  Vitals:    07/11/23 1404   BP: (!) 124/58   Pulse: 67   Resp: 18     There is no height or weight on file to calculate BMI.     Physical Exam:  Physical Exam  Vitals reviewed.   Constitutional:       General: He is not in acute distress.     Appearance: Normal appearance. He is well-developed and normal weight.   HENT:      Head: Normocephalic and atraumatic.      Nose: Nose normal.      Mouth/Throat:      Mouth: Mucous membranes are dry.   Eyes:      Pupils: Pupils are equal, round, and reactive to light.   Cardiovascular:      Rate and Rhythm: Normal rate and regular rhythm.      Pulses: Normal pulses.      Heart sounds: Normal heart sounds.   Pulmonary:      Effort: Pulmonary effort is normal.      Breath sounds: Normal breath sounds.   Abdominal:      General: Bowel sounds are normal.      Palpations: Abdomen is soft.   Musculoskeletal:         General: Normal range of motion.      Cervical back: Normal range of motion and neck supple.      Right lower leg: Edema (1+) present.      Left lower leg: Edema (1+) present.   Skin:     General: Skin is warm and dry.   Neurological:      General: No focal deficit present.      Mental Status: He is alert and oriented to person, place, and time. Mental status is at baseline.   Psychiatric:         Behavior: Behavior normal.         Thought Content: Thought content normal.         Judgment: Judgment normal.       Laboratory  Lab Results   Component Value Date    WBC 8.00 07/03/2023    HGB 8.0 (L) 07/03/2023    HCT 24.8 (L) 07/03/2023     (H) 07/03/2023     07/03/2023     Lab Results   Component Value Date    INR 1.0 06/23/2023    INR 1.2 07/10/2012    INR 1.0 07/09/2012     Lab Results   Component Value Date    HGBA1C 5.6 07/09/2012     No results for input(s): POCTGLUCOSE in the last 72 hours.    Diagnostic Results:      TRANSITION OF CARE:     [facility]  On Call Contact Note: 6/29    Family and/or Caretaker present at visit?  Yes.  Diagnostic tests reviewed/disposition: No diagnosic tests pending after this hospitalization.  Disease/illness education: Take all medication as prescribed. Activity as tolerated. Keep all upcoming appts.   Home health/community services discussion/referrals: Patient has home health established at [facility] .   Establishment or re-establishment of referral orders for community resources: No other necessary community resources.   Discussion with other health care providers: No discussion with other health care providers necessary.     Transition of Care Visit:  I have reviewed and updated the history and problem list.  I have reconciled the medication list.  I have discussed the hospitalization and current medical issues, prognosis and plans with the patient/family.  I  spent more than 50% of time discussing the care with the patient/family.  Total Face-to-Face Encounter: 60 minutes.    Medications Reconciliation:   I have reconciled the patient's home medications and discharge medications with the patient/family. I have updated all changes.  Refer to After-Visit Medication List.    ASSESSMENT & PLAN:     HIGH RISK CONDITION(S):      Problem List Items Addressed This Visit          Cardiac/Vascular    Primary hypertension    Current Assessment & Plan     Chronic, stable  Continue current plan         NSTEMI (non-ST elevated myocardial infarction)    Current Assessment & Plan     Troponin increased from 0.623 to 5.956  Was on heparin drip inpatient  Currently on ASA/Plavis  F/u with cardiology 7/17         Streptococcal endocarditis    Current Assessment & Plan     Blood cultures grew strep  STARLA revealed A highly mobile small vegetation attached on the root of anterior mitral valve leaflet, next to aortic root. S/p bioprosthetic AVR. NO vegetation or calcification on AVR. Some aortic root wall thickening, no aortic root visualized.  Tricuspid and pulmonic valve ok.   Normal EF, mild MR and TR  Initiated on IV Rocephin  Receiving 6 weeks IV Rocephin via PICC line in left arm              Renal/    MAGO (acute kidney injury)    Current Assessment & Plan     Cr 2 on admission, improved to 1.56  Received IV fluids in hospital  Held HCTZ/ARB  Continue plan            Other Visit Diagnoses       Hospital discharge follow-up    -  Primary             Were controlled substances prescribed?  No    Instructions for the patient:  - Continue all medications, treatments and therapies as ordered.   - Follow all instructions, recommendations as discussed.  - Maintain Safety Precautions at all times.  - Attend all medical appointments as scheduled.  - For worsening symptoms: call Primary Care Physician or Nurse Practitioner.  - For emergencies, call 911 or immediately report to the nearest emergency room.     Scheduled Follow-up :  Future Appointments   Date Time Provider Department Center   7/17/2023 10:20 AM Dallin Bueno MD [facility] CARDIO [facility] Medical C   10/20/2023 10:40 AM Dallin Bueno MD [facility] CARDIO  Medical C       After Visit Medication List :     Medication List            Accurate as of July 11, 2023  2:42 PM. If you have any questions, ask your nurse or doctor.                CONTINUE taking these medications      aspirin 81 MG EC tablet  Commonly known as: ECOTRIN     cholecalciferol (vitamin D3) 25 mcg (1,000 unit) capsule  Commonly known as: VITAMIN D3     clopidogreL 75 mg tablet  Commonly known as: PLAVIX  Take 1 tablet (75 mg total) by mouth once daily.     dextrose 5 % in water (D5W) 5 % PgBk 100 mL with cefTRIAXone 2 gram SolR 2 g  Inject 2 g into the vein once daily.     esomeprazole 40 MG capsule  Commonly known as: NEXIUM  Take 1 capsule (40 mg total) by mouth before dinner.     ferrous gluconate 324 MG tablet  Commonly known as: FERGON     fluticasone propionate 50 mcg/actuation nasal spray  Commonly known as: FLONASE      hydrocortisone 2.5 % cream     levothyroxine 112 MCG tablet  Commonly known as: SYNTHROID     multivit-iron-min-folic acid 3,500-18-0.4 unit-mg-mg Chew     rosuvastatin 20 MG tablet  Commonly known as: CRESTOR     traMADoL 50 mg tablet  Commonly known as: ULTRAM              Signature: Willie Leigh NP

## 2023-07-11 NOTE — ASSESSMENT & PLAN NOTE
Troponin increased from 0.623 to 5.956  Was on heparin drip inpatient  Currently on ASA/Plavis  F/u with cardiology 7/17

## 2023-07-11 NOTE — ASSESSMENT & PLAN NOTE
Cr 2 on admission, improved to 1.56  Received IV fluids in hospital  Held HCTZ/ARB  Continue plan

## 2023-07-11 NOTE — ASSESSMENT & PLAN NOTE
Blood cultures grew strep  STARLA revealed A highly mobile small vegetation attached on the root of anterior mitral valve leaflet, next to aortic root. S/p bioprosthetic AVR. NO vegetation or calcification on AVR. Some aortic root wall thickening, no aortic root visualized. Tricuspid and pulmonic valve ok.   Normal EF, mild MR and TR  Initiated on IV Rocephin  Receiving 6 weeks IV Rocephin via PICC line in left arm

## 2023-07-14 ENCOUNTER — EXTERNAL HOME HEALTH (OUTPATIENT)
Dept: HOME HEALTH SERVICES | Facility: HOSPITAL | Age: 88
End: 2023-07-14
Payer: MEDICARE

## 2023-07-17 ENCOUNTER — OFFICE VISIT (OUTPATIENT)
Dept: CARDIOLOGY | Facility: CLINIC | Age: 88
End: 2023-07-17
Payer: MEDICARE

## 2023-07-17 VITALS
HEIGHT: 70 IN | SYSTOLIC BLOOD PRESSURE: 130 MMHG | BODY MASS INDEX: 22.85 KG/M2 | DIASTOLIC BLOOD PRESSURE: 70 MMHG | WEIGHT: 159.63 LBS | HEART RATE: 68 BPM

## 2023-07-17 DIAGNOSIS — E78.00 PURE HYPERCHOLESTEROLEMIA: ICD-10-CM

## 2023-07-17 DIAGNOSIS — Z95.2 S/P AVR (AORTIC VALVE REPLACEMENT): Primary | ICD-10-CM

## 2023-07-17 DIAGNOSIS — I21.4 NSTEMI (NON-ST ELEVATED MYOCARDIAL INFARCTION): ICD-10-CM

## 2023-07-17 DIAGNOSIS — I10 PRIMARY HYPERTENSION: ICD-10-CM

## 2023-07-17 DIAGNOSIS — K21.9 GASTROESOPHAGEAL REFLUX DISEASE, UNSPECIFIED WHETHER ESOPHAGITIS PRESENT: ICD-10-CM

## 2023-07-17 PROCEDURE — 1111F PR DISCHARGE MEDS RECONCILED W/ CURRENT OUTPATIENT MED LIST: ICD-10-PCS | Mod: CPTII,S$GLB,, | Performed by: INTERNAL MEDICINE

## 2023-07-17 PROCEDURE — 99214 OFFICE O/P EST MOD 30 MIN: CPT | Mod: S$GLB,,, | Performed by: INTERNAL MEDICINE

## 2023-07-17 PROCEDURE — 1101F PR PT FALLS ASSESS DOC 0-1 FALLS W/OUT INJ PAST YR: ICD-10-PCS | Mod: CPTII,S$GLB,, | Performed by: INTERNAL MEDICINE

## 2023-07-17 PROCEDURE — 99999 PR PBB SHADOW E&M-EST. PATIENT-LVL IV: CPT | Mod: PBBFAC,,, | Performed by: INTERNAL MEDICINE

## 2023-07-17 PROCEDURE — 1160F RVW MEDS BY RX/DR IN RCRD: CPT | Mod: CPTII,S$GLB,, | Performed by: INTERNAL MEDICINE

## 2023-07-17 PROCEDURE — 1111F DSCHRG MED/CURRENT MED MERGE: CPT | Mod: CPTII,S$GLB,, | Performed by: INTERNAL MEDICINE

## 2023-07-17 PROCEDURE — 1126F AMNT PAIN NOTED NONE PRSNT: CPT | Mod: CPTII,S$GLB,, | Performed by: INTERNAL MEDICINE

## 2023-07-17 PROCEDURE — 1126F PR PAIN SEVERITY QUANTIFIED, NO PAIN PRESENT: ICD-10-PCS | Mod: CPTII,S$GLB,, | Performed by: INTERNAL MEDICINE

## 2023-07-17 PROCEDURE — 1101F PT FALLS ASSESS-DOCD LE1/YR: CPT | Mod: CPTII,S$GLB,, | Performed by: INTERNAL MEDICINE

## 2023-07-17 PROCEDURE — 3288F FALL RISK ASSESSMENT DOCD: CPT | Mod: CPTII,S$GLB,, | Performed by: INTERNAL MEDICINE

## 2023-07-17 PROCEDURE — 99999 PR PBB SHADOW E&M-EST. PATIENT-LVL IV: ICD-10-PCS | Mod: PBBFAC,,, | Performed by: INTERNAL MEDICINE

## 2023-07-17 PROCEDURE — 99214 PR OFFICE/OUTPT VISIT, EST, LEVL IV, 30-39 MIN: ICD-10-PCS | Mod: S$GLB,,, | Performed by: INTERNAL MEDICINE

## 2023-07-17 PROCEDURE — 1160F PR REVIEW ALL MEDS BY PRESCRIBER/CLIN PHARMACIST DOCUMENTED: ICD-10-PCS | Mod: CPTII,S$GLB,, | Performed by: INTERNAL MEDICINE

## 2023-07-17 PROCEDURE — 3288F PR FALLS RISK ASSESSMENT DOCUMENTED: ICD-10-PCS | Mod: CPTII,S$GLB,, | Performed by: INTERNAL MEDICINE

## 2023-07-17 PROCEDURE — 1159F MED LIST DOCD IN RCRD: CPT | Mod: CPTII,S$GLB,, | Performed by: INTERNAL MEDICINE

## 2023-07-17 PROCEDURE — 1159F PR MEDICATION LIST DOCUMENTED IN MEDICAL RECORD: ICD-10-PCS | Mod: CPTII,S$GLB,, | Performed by: INTERNAL MEDICINE

## 2023-07-17 RX ORDER — CEFTRIAXONE 2 G/1
2 INJECTION, POWDER, FOR SOLUTION INTRAMUSCULAR; INTRAVENOUS
COMMUNITY
Start: 2023-06-28 | End: 2023-08-06

## 2023-07-17 RX ORDER — HYDROCHLOROTHIAZIDE 12.5 MG/1
12.5 TABLET ORAL DAILY
Qty: 30 TABLET | Refills: 11 | Status: SHIPPED | OUTPATIENT
Start: 2023-07-17 | End: 2024-07-16

## 2023-07-17 RX ORDER — PANTOPRAZOLE SODIUM 40 MG/1
1 TABLET, DELAYED RELEASE ORAL EVERY MORNING
COMMUNITY
Start: 2023-06-28

## 2023-07-17 NOTE — PROGRESS NOTES
Subjective:   Patient ID:  Juan Castellanos is a 89 y.o. male who presents for follow-up of Hypertension (1 mo f/u)  S/p SBE MV  On IV antibiotics, 4 more weeks  Patient denies CP, angina or anginal equivalent.   Hyperlipidemia  This is a chronic problem. The current episode started more than 1 year ago. The problem is controlled. Pertinent negatives include no chest pain or shortness of breath. Current antihyperlipidemic treatment includes statins. The current treatment provides moderate improvement of lipids. There are no compliance problems.    Hypertension  This is a chronic problem. The current episode started more than 1 year ago. The problem has been gradually improving since onset. The problem is controlled. Pertinent negatives include no chest pain, palpitations or shortness of breath. Past treatments include angiotensin blockers and diuretics. The current treatment provides moderate improvement. There are no compliance problems.      Review of Systems   Constitutional: Negative. Negative for weight gain.   HENT: Negative.     Eyes: Negative.    Cardiovascular: Negative.  Negative for chest pain, leg swelling and palpitations.   Respiratory: Negative.  Negative for shortness of breath.    Endocrine: Negative.    Hematologic/Lymphatic: Negative.    Skin: Negative.    Musculoskeletal:  Negative for muscle weakness.   Gastrointestinal: Negative.    Genitourinary: Negative.    Neurological: Negative.  Negative for dizziness.   Psychiatric/Behavioral: Negative.     Allergic/Immunologic: Negative.    All other systems reviewed and are negative.  Family History   Problem Relation Age of Onset    Cataracts Father     Diabetes Brother      Past Medical History:   Diagnosis Date    Allergy     Anxiety     Arthritis     Blood transfusion     Cataract     Depression     GERD (gastroesophageal reflux disease)     Heart murmur     Hypercholesterolemia     Primary hypertension 11/24/2021    Prostate cancer     Thyroid  disease      Social History     Socioeconomic History    Marital status:    Tobacco Use    Smoking status: Former    Smokeless tobacco: Never    Tobacco comments:     quit 40 years ago   Substance and Sexual Activity    Alcohol use: No    Drug use: No    Sexual activity: Not Currently     Current Outpatient Medications on File Prior to Visit   Medication Sig Dispense Refill    aspirin (ECOTRIN) 81 MG EC tablet Take 81 mg by mouth once daily.      cholecalciferol, vitamin D3, 1,000 unit capsule Take 1,000 Units by mouth 2 (two) times daily.      clopidogreL (PLAVIX) 75 mg tablet Take 1 tablet (75 mg total) by mouth once daily. 30 tablet 0    dextrose 5 % in water (D5W) 5 % PgBk 100 mL with cefTRIAXone 2 gram SolR 2 g Inject 2 g into the vein once daily.      ferrous gluconate (FERGON) 324 MG tablet Take 324 mg by mouth daily with breakfast.      fluticasone (FLONASE) 50 mcg/actuation nasal spray 1 spray by Each Nare route once daily.      levothyroxine (SYNTHROID) 112 MCG tablet Take 1 tablet by mouth every morning.      MULTIVIT-IRON-MIN-FOLIC ACID 3,500-18-0.4 UNIT-MG-MG ORAL CHEW Take 1 tablet by mouth once daily.      pantoprazole (PROTONIX) 40 MG tablet Take 1 tablet by mouth every morning.      rosuvastatin (CRESTOR) 20 MG tablet Take 20 mg by mouth every evening.      tramadol (ULTRAM) 50 mg tablet Take 50 mg by mouth every 6 (six) hours as needed.      cefTRIAXone (ROCEPHIN) 2 gram injection Inject 2 g into the vein.      esomeprazole (NEXIUM) 40 MG capsule Take 1 capsule (40 mg total) by mouth before dinner. (Patient not taking: Reported on 7/17/2023) 30 capsule 0    hydrocortisone 2.5 % cream Apply topically 2 (two) times daily.       No current facility-administered medications on file prior to visit.     Review of patient's allergies indicates:   Allergen Reactions    Nsaids (non-steroidal anti-inflammatory drug)      Kidney disease    Tomato (solanum lycopersicum)     Grape Rash    [place] Rash        Objective:     Physical Exam  Vitals and nursing note reviewed.   Constitutional:       Appearance: He is well-developed.   HENT:      Head: Normocephalic and atraumatic.   Eyes:      Conjunctiva/sclera: Conjunctivae normal.      Pupils: Pupils are equal, round, and reactive to light.   Cardiovascular:      Rate and Rhythm: Normal rate and regular rhythm.      Pulses: Intact distal pulses.      Heart sounds: Normal heart sounds.   Pulmonary:      Effort: Pulmonary effort is normal.      Breath sounds: Normal breath sounds.   Abdominal:      General: Bowel sounds are normal.      Palpations: Abdomen is soft.   Musculoskeletal:      Cervical back: Normal range of motion and neck supple.   Skin:     General: Skin is warm and dry.   Neurological:      Mental Status: He is alert and oriented to person, place, and time.       Assessment:     1. S/P AVR (aortic valve replacement)    2. Pure hypercholesterolemia    3. Primary hypertension    4. NSTEMI (non-ST elevated myocardial infarction)    5. Gastroesophageal reflux disease, unspecified whether esophagitis present        Plan:     S/P AVR (aortic valve replacement)    Pure hypercholesterolemia    Primary hypertension    NSTEMI (non-ST elevated myocardial infarction)    Gastroesophageal reflux disease, unspecified whether esophagitis present    Continue asa- primary prvention  Continue statin-hlp  restart hctz-htn/edema  Continue appt ID

## 2023-08-02 ENCOUNTER — OFFICE VISIT (OUTPATIENT)
Dept: INFECTIOUS DISEASES | Facility: CLINIC | Age: 88
End: 2023-08-02
Payer: MEDICARE

## 2023-08-02 VITALS
OXYGEN SATURATION: 96 % | DIASTOLIC BLOOD PRESSURE: 71 MMHG | SYSTOLIC BLOOD PRESSURE: 134 MMHG | WEIGHT: 159.63 LBS | HEART RATE: 74 BPM | HEIGHT: 70 IN | BODY MASS INDEX: 22.85 KG/M2

## 2023-08-02 DIAGNOSIS — I10 PRIMARY HYPERTENSION: ICD-10-CM

## 2023-08-02 DIAGNOSIS — I33.0 STREPTOCOCCAL ENDOCARDITIS: ICD-10-CM

## 2023-08-02 DIAGNOSIS — Z95.2 S/P AVR (AORTIC VALVE REPLACEMENT): ICD-10-CM

## 2023-08-02 DIAGNOSIS — B95.5 STREPTOCOCCAL ENDOCARDITIS: ICD-10-CM

## 2023-08-02 PROCEDURE — 99214 PR OFFICE/OUTPT VISIT, EST, LEVL IV, 30-39 MIN: ICD-10-PCS | Mod: S$GLB,,, | Performed by: INTERNAL MEDICINE

## 2023-08-02 PROCEDURE — 3288F PR FALLS RISK ASSESSMENT DOCUMENTED: ICD-10-PCS | Mod: CPTII,S$GLB,, | Performed by: INTERNAL MEDICINE

## 2023-08-02 PROCEDURE — 1159F PR MEDICATION LIST DOCUMENTED IN MEDICAL RECORD: ICD-10-PCS | Mod: CPTII,S$GLB,, | Performed by: INTERNAL MEDICINE

## 2023-08-02 PROCEDURE — 99214 OFFICE O/P EST MOD 30 MIN: CPT | Mod: S$GLB,,, | Performed by: INTERNAL MEDICINE

## 2023-08-02 PROCEDURE — 1101F PR PT FALLS ASSESS DOC 0-1 FALLS W/OUT INJ PAST YR: ICD-10-PCS | Mod: CPTII,S$GLB,, | Performed by: INTERNAL MEDICINE

## 2023-08-02 PROCEDURE — 99999 PR PBB SHADOW E&M-EST. PATIENT-LVL III: CPT | Mod: PBBFAC,,, | Performed by: INTERNAL MEDICINE

## 2023-08-02 PROCEDURE — 99999 PR PBB SHADOW E&M-EST. PATIENT-LVL III: ICD-10-PCS | Mod: PBBFAC,,, | Performed by: INTERNAL MEDICINE

## 2023-08-02 PROCEDURE — 1159F MED LIST DOCD IN RCRD: CPT | Mod: CPTII,S$GLB,, | Performed by: INTERNAL MEDICINE

## 2023-08-02 PROCEDURE — 3288F FALL RISK ASSESSMENT DOCD: CPT | Mod: CPTII,S$GLB,, | Performed by: INTERNAL MEDICINE

## 2023-08-02 PROCEDURE — 1101F PT FALLS ASSESS-DOCD LE1/YR: CPT | Mod: CPTII,S$GLB,, | Performed by: INTERNAL MEDICINE

## 2023-08-02 NOTE — ASSESSMENT & PLAN NOTE
Will complete 6 weeks of Iv rocephin -EOc-08/08/23  Advised to get dental exam while on antibiotics-  He will also need antibiotic therapy prior to any dental work   Will plan for follow up ECHO too

## 2023-08-02 NOTE — PROGRESS NOTES
Subjective     Patient ID: Juan Castellanos is a 89 y.o. male.    Chief Complaint:  follow up       HPI  06/26/23  89 y.o. male with a PMH  has a past medical history of Allergy, Anxiety, Arthritis, Blood transfusion, Cataract, Depression, GERD (gastroesophageal reflux disease), Heart murmur, s/p AVR Hypercholesterolemia admitted with  epigastric/right upper quadrant abdominal pain .  Since admission-blood cultures done 06/22-      STREPTOCOCCUS ANGINOSUS      Echo showed-There is a bioprosthetic aortic valve present. There is no aortic insufficiency present. Prosthetic aortic valve is normal.  The aortic valve mean gradient is 24 mmHg with a dimensionless index of 0.64  '  08/2-  The  ID plan to complete 6 weeks of IV Rocephin   Review of Systems   Constitutional:  Negative for activity change, appetite change, chills and diaphoresis.   Respiratory:  Negative for apnea and chest tightness.    Musculoskeletal:  Negative for arthralgias.          Objective     Physical Exam  Vitals reviewed.   HENT:      Head: Normocephalic and atraumatic.   Eyes:      Pupils: Pupils are equal, round, and reactive to light.   Neck:      Thyroid: No thyromegaly.   Cardiovascular:      Rate and Rhythm: Normal rate and regular rhythm.   Pulmonary:      Effort: Pulmonary effort is normal. No respiratory distress.      Breath sounds: No wheezing.   Abdominal:      General: Bowel sounds are normal.      Palpations: Abdomen is soft.      Tenderness: There is no abdominal tenderness.   Musculoskeletal:      Cervical back: Normal range of motion and neck supple.            Assessment and Plan     Problem List Items Addressed This Visit       S/P AVR (aortic valve replacement)     Cardiology follow up         Primary hypertension     On HCTZ         Streptococcal endocarditis     Will complete 6 weeks of Iv rocephin -EOc-08/08/23  Advised to get dental exam while on antibiotics-  He will also need antibiotic therapy prior to any dental work    Will plan for follow up ECHO too

## 2023-08-10 ENCOUNTER — HOSPITAL ENCOUNTER (OUTPATIENT)
Dept: CARDIOLOGY | Facility: HOSPITAL | Age: 88
Discharge: HOME | End: 2023-08-10
Attending: INTERNAL MEDICINE
Payer: MEDICARE

## 2023-08-10 ENCOUNTER — OFFICE VISIT (OUTPATIENT)
Dept: INFECTIOUS DISEASES | Facility: CLINIC | Age: 88
End: 2023-08-10
Payer: MEDICARE

## 2023-08-10 VITALS
HEIGHT: 70 IN | BODY MASS INDEX: 22.85 KG/M2 | OXYGEN SATURATION: 92 % | HEART RATE: 63 BPM | WEIGHT: 159.63 LBS | SYSTOLIC BLOOD PRESSURE: 121 MMHG | DIASTOLIC BLOOD PRESSURE: 64 MMHG

## 2023-08-10 VITALS
SYSTOLIC BLOOD PRESSURE: 134 MMHG | HEART RATE: 56 BPM | BODY MASS INDEX: 22.76 KG/M2 | HEIGHT: 70 IN | DIASTOLIC BLOOD PRESSURE: 71 MMHG | WEIGHT: 159 LBS

## 2023-08-10 DIAGNOSIS — I10 PRIMARY HYPERTENSION: ICD-10-CM

## 2023-08-10 DIAGNOSIS — I33.0 STREPTOCOCCAL ENDOCARDITIS: ICD-10-CM

## 2023-08-10 DIAGNOSIS — B95.5 STREPTOCOCCAL ENDOCARDITIS: ICD-10-CM

## 2023-08-10 DIAGNOSIS — Z95.2 S/P AVR (AORTIC VALVE REPLACEMENT): ICD-10-CM

## 2023-08-10 LAB
AORTIC ROOT ANNULUS: 2.3 CM
ASCENDING AORTA: 3.03 CM
AV INDEX (PROSTH): 0.32
AV MEAN GRADIENT: 24 MMHG
AV PEAK GRADIENT: 42 MMHG
AV VALVE AREA BY VELOCITY RATIO: 0.96 CM²
AV VALVE AREA: 0.95 CM²
AV VELOCITY RATIO: 0.32
BSA FOR ECHO PROCEDURE: 1.89 M2
CV ECHO LV RWT: 0.55 CM
DOP CALC AO PEAK VEL: 3.23 M/S
DOP CALC AO VTI: 80.9 CM
DOP CALC LVOT AREA: 3 CM2
DOP CALC LVOT DIAMETER: 1.95 CM
DOP CALC LVOT PEAK VEL: 1.04 M/S
DOP CALC LVOT STROKE VOLUME: 77.01 CM3
DOP CALC RVOT PEAK VEL: 0.56 M/S
DOP CALC RVOT VTI: 13.7 CM
DOP CALCLVOT PEAK VEL VTI: 25.8 CM
E WAVE DECELERATION TIME: 359.88 MSEC
E/A RATIO: 1.26
E/E' RATIO: 15.62 M/S
ECHO LV POSTERIOR WALL: 1.23 CM (ref 0.6–1.1)
FRACTIONAL SHORTENING: 36 % (ref 28–44)
INTERVENTRICULAR SEPTUM: 1.23 CM (ref 0.6–1.1)
IVRT: 77.07 MSEC
LA MAJOR: 5.39 CM
LA MINOR: 4.63 CM
LA WIDTH: 4.3 CM
LEFT ATRIUM SIZE: 4.18 CM
LEFT ATRIUM VOLUME INDEX MOD: 24.5 ML/M2
LEFT ATRIUM VOLUME INDEX: 40.3 ML/M2
LEFT ATRIUM VOLUME MOD: 46.39 CM3
LEFT ATRIUM VOLUME: 76.1 CM3
LEFT INTERNAL DIMENSION IN SYSTOLE: 2.89 CM (ref 2.1–4)
LEFT VENTRICLE DIASTOLIC VOLUME INDEX: 49.2 ML/M2
LEFT VENTRICLE DIASTOLIC VOLUME: 92.98 ML
LEFT VENTRICLE MASS INDEX: 109 G/M2
LEFT VENTRICLE SYSTOLIC VOLUME INDEX: 16.9 ML/M2
LEFT VENTRICLE SYSTOLIC VOLUME: 31.93 ML
LEFT VENTRICULAR INTERNAL DIMENSION IN DIASTOLE: 4.51 CM (ref 3.5–6)
LEFT VENTRICULAR MASS: 206 G
LV LATERAL E/E' RATIO: 13.67 M/S
LV SEPTAL E/E' RATIO: 18.22 M/S
LVOT MG: 2.58 MMHG
LVOT MV: 0.77 CM/S
MV PEAK A VEL: 1.3 M/S
MV PEAK E VEL: 1.64 M/S
MV STENOSIS PRESSURE HALF TIME: 104.37 MS
MV VALVE AREA P 1/2 METHOD: 2.11 CM2
PISA TR MAX VEL: 2.98 M/S
PULM VEIN S/D RATIO: 0.62
PV MEAN GRADIENT: 1 MMHG
PV PEAK D VEL: 0.78 M/S
PV PEAK GRADIENT: 1 MMHG
PV PEAK S VEL: 0.48 M/S
PV PEAK VELOCITY: 1.45 M/S
RA MAJOR: 5.05 CM
RA PRESSURE ESTIMATED: 3 MMHG
RA WIDTH: 3.68 CM
RIGHT VENTRICULAR END-DIASTOLIC DIMENSION: 3.53 CM
RV TB RVSP: 6 MMHG
RV TISSUE DOPPLER FREE WALL SYSTOLIC VELOCITY 1 (APICAL 4 CHAMBER VIEW): 0.16 CM/S
SINUS: 2.16 CM
STJ: 1.92 CM
TDI LATERAL: 0.12 M/S
TDI SEPTAL: 0.09 M/S
TDI: 0.11 M/S
TR MAX PG: 36 MMHG
TRICUSPID ANNULAR PLANE SYSTOLIC EXCURSION: 1.59 CM
TV REST PULMONARY ARTERY PRESSURE: 39 MMHG
Z-SCORE OF LEFT VENTRICULAR DIMENSION IN END DIASTOLE: -1.6
Z-SCORE OF LEFT VENTRICULAR DIMENSION IN END SYSTOLE: -0.95

## 2023-08-10 PROCEDURE — 3288F PR FALLS RISK ASSESSMENT DOCUMENTED: ICD-10-PCS | Mod: CPTII,S$GLB,, | Performed by: INTERNAL MEDICINE

## 2023-08-10 PROCEDURE — 93306 ECHO (CUPID ONLY): ICD-10-PCS | Mod: 26,,, | Performed by: INTERNAL MEDICINE

## 2023-08-10 PROCEDURE — 1101F PR PT FALLS ASSESS DOC 0-1 FALLS W/OUT INJ PAST YR: ICD-10-PCS | Mod: CPTII,S$GLB,, | Performed by: INTERNAL MEDICINE

## 2023-08-10 PROCEDURE — 99999 PR PBB SHADOW E&M-EST. PATIENT-LVL III: ICD-10-PCS | Mod: PBBFAC,,, | Performed by: INTERNAL MEDICINE

## 2023-08-10 PROCEDURE — 99999 PR PBB SHADOW E&M-EST. PATIENT-LVL III: CPT | Mod: PBBFAC,,, | Performed by: INTERNAL MEDICINE

## 2023-08-10 PROCEDURE — 99214 OFFICE O/P EST MOD 30 MIN: CPT | Mod: S$GLB,,, | Performed by: INTERNAL MEDICINE

## 2023-08-10 PROCEDURE — 93306 TTE W/DOPPLER COMPLETE: CPT | Mod: 26,,, | Performed by: INTERNAL MEDICINE

## 2023-08-10 PROCEDURE — 1101F PT FALLS ASSESS-DOCD LE1/YR: CPT | Mod: CPTII,S$GLB,, | Performed by: INTERNAL MEDICINE

## 2023-08-10 PROCEDURE — 3288F FALL RISK ASSESSMENT DOCD: CPT | Mod: CPTII,S$GLB,, | Performed by: INTERNAL MEDICINE

## 2023-08-10 PROCEDURE — 93306 TTE W/DOPPLER COMPLETE: CPT

## 2023-08-10 PROCEDURE — 1159F PR MEDICATION LIST DOCUMENTED IN MEDICAL RECORD: ICD-10-PCS | Mod: CPTII,S$GLB,, | Performed by: INTERNAL MEDICINE

## 2023-08-10 PROCEDURE — 99214 PR OFFICE/OUTPT VISIT, EST, LEVL IV, 30-39 MIN: ICD-10-PCS | Mod: S$GLB,,, | Performed by: INTERNAL MEDICINE

## 2023-08-10 PROCEDURE — 1159F MED LIST DOCD IN RCRD: CPT | Mod: CPTII,S$GLB,, | Performed by: INTERNAL MEDICINE

## 2023-08-10 NOTE — PROGRESS NOTES
Subjective     Patient ID: Juan Castellanos is a 89 y.o. male.    Chief Complaint: No chief complaint on file.    HPI  Last Id note-    06/26/23  89 y.o. male with a PMH  has a past medical history of Allergy, Anxiety, Arthritis, Blood transfusion, Cataract, Depression, GERD (gastroesophageal reflux disease), Heart murmur, s/p AVR Hypercholesterolemia admitted with  epigastric/right upper quadrant abdominal pain .  Since admission-blood cultures done 06/22-      STREPTOCOCCUS ANGINOSUS      Echo showed-There is a bioprosthetic aortic valve present. There is no aortic insufficiency present. Prosthetic aortic valve is normal.  The aortic valve mean gradient is 24 mmHg with a dimensionless index of 0.64  '  08/2-  The  ID plan to complete 6 weeks of IV Rocephin     08/10- he comes for follow up   EOC -08/08  He has completed 6 weeks of regime-08/08.        Review of Systems   Constitutional:  Negative for activity change, appetite change and chills.   Respiratory:  Negative for apnea.    Neurological:  Negative for dizziness, facial asymmetry, coordination difficulties and coordination difficulties.          Objective     Physical Exam  Vitals and nursing note reviewed.   HENT:      Head: Normocephalic and atraumatic.   Eyes:      Pupils: Pupils are equal, round, and reactive to light.   Neck:      Thyroid: No thyromegaly.   Cardiovascular:      Rate and Rhythm: Normal rate and regular rhythm.   Pulmonary:      Effort: Pulmonary effort is normal. No respiratory distress.      Breath sounds: No wheezing.   Abdominal:      General: Bowel sounds are normal.      Palpations: Abdomen is soft.      Tenderness: There is no abdominal tenderness.   Musculoskeletal:      Cervical back: Normal range of motion and neck supple.            Assessment and Plan     Problem List Items Addressed This Visit       Primary hypertension     BP control as per primary team         Streptococcal endocarditis     S/p 6 weeks of Rocephin   EOC  08/08- will follow repeat ECHo  PICC line pulled

## 2023-08-14 RX ORDER — CEPHALEXIN 500 MG/1
500 CAPSULE ORAL EVERY 12 HOURS
Qty: 60 CAPSULE | Refills: 4 | Status: SHIPPED | OUTPATIENT
Start: 2023-08-14 | End: 2024-01-11

## 2023-08-14 NOTE — PROGRESS NOTES
Echo showed Moderately calcified posterior leaflet. No annular dilation. There is a possible small fixed irregular mass on the anterior leaflet. There is moderate regurgitation.  ·  Tricuspid Valve: There is mild regurgitation with a centrally directed jet.  Plan - He has completed 6 weeks of Rocephin -this can represent residual lesion .  However,due to his age , will add Po Keflex for 2 months - will see in clinic after a month , might need suppressive regime

## 2023-08-23 ENCOUNTER — OFFICE VISIT (OUTPATIENT)
Dept: CARDIOLOGY | Facility: CLINIC | Age: 88
End: 2023-08-23
Payer: MEDICARE

## 2023-08-23 VITALS
HEIGHT: 70 IN | OXYGEN SATURATION: 96 % | WEIGHT: 152.56 LBS | BODY MASS INDEX: 21.84 KG/M2 | DIASTOLIC BLOOD PRESSURE: 60 MMHG | SYSTOLIC BLOOD PRESSURE: 118 MMHG | HEART RATE: 70 BPM

## 2023-08-23 DIAGNOSIS — Z95.2 S/P AVR (AORTIC VALVE REPLACEMENT): Primary | ICD-10-CM

## 2023-08-23 DIAGNOSIS — B95.5 STREPTOCOCCAL ENDOCARDITIS: ICD-10-CM

## 2023-08-23 DIAGNOSIS — E78.00 PURE HYPERCHOLESTEROLEMIA: ICD-10-CM

## 2023-08-23 DIAGNOSIS — I10 PRIMARY HYPERTENSION: ICD-10-CM

## 2023-08-23 DIAGNOSIS — N17.9 AKI (ACUTE KIDNEY INJURY): ICD-10-CM

## 2023-08-23 DIAGNOSIS — I21.4 NSTEMI (NON-ST ELEVATED MYOCARDIAL INFARCTION): ICD-10-CM

## 2023-08-23 DIAGNOSIS — R10.13 EPIGASTRIC PAIN: ICD-10-CM

## 2023-08-23 DIAGNOSIS — I33.0 STREPTOCOCCAL ENDOCARDITIS: ICD-10-CM

## 2023-08-23 PROCEDURE — 99214 PR OFFICE/OUTPT VISIT, EST, LEVL IV, 30-39 MIN: ICD-10-PCS | Mod: S$GLB,,, | Performed by: INTERNAL MEDICINE

## 2023-08-23 PROCEDURE — 3288F PR FALLS RISK ASSESSMENT DOCUMENTED: ICD-10-PCS | Mod: CPTII,S$GLB,, | Performed by: INTERNAL MEDICINE

## 2023-08-23 PROCEDURE — 99999 PR PBB SHADOW E&M-EST. PATIENT-LVL IV: ICD-10-PCS | Mod: PBBFAC,,, | Performed by: INTERNAL MEDICINE

## 2023-08-23 PROCEDURE — 1126F PR PAIN SEVERITY QUANTIFIED, NO PAIN PRESENT: ICD-10-PCS | Mod: CPTII,S$GLB,, | Performed by: INTERNAL MEDICINE

## 2023-08-23 PROCEDURE — 99214 OFFICE O/P EST MOD 30 MIN: CPT | Mod: S$GLB,,, | Performed by: INTERNAL MEDICINE

## 2023-08-23 PROCEDURE — 1126F AMNT PAIN NOTED NONE PRSNT: CPT | Mod: CPTII,S$GLB,, | Performed by: INTERNAL MEDICINE

## 2023-08-23 PROCEDURE — 1101F PR PT FALLS ASSESS DOC 0-1 FALLS W/OUT INJ PAST YR: ICD-10-PCS | Mod: CPTII,S$GLB,, | Performed by: INTERNAL MEDICINE

## 2023-08-23 PROCEDURE — 1159F MED LIST DOCD IN RCRD: CPT | Mod: CPTII,S$GLB,, | Performed by: INTERNAL MEDICINE

## 2023-08-23 PROCEDURE — 3288F FALL RISK ASSESSMENT DOCD: CPT | Mod: CPTII,S$GLB,, | Performed by: INTERNAL MEDICINE

## 2023-08-23 PROCEDURE — 1101F PT FALLS ASSESS-DOCD LE1/YR: CPT | Mod: CPTII,S$GLB,, | Performed by: INTERNAL MEDICINE

## 2023-08-23 PROCEDURE — 1159F PR MEDICATION LIST DOCUMENTED IN MEDICAL RECORD: ICD-10-PCS | Mod: CPTII,S$GLB,, | Performed by: INTERNAL MEDICINE

## 2023-08-23 PROCEDURE — 99999 PR PBB SHADOW E&M-EST. PATIENT-LVL IV: CPT | Mod: PBBFAC,,, | Performed by: INTERNAL MEDICINE

## 2023-08-23 NOTE — PROGRESS NOTES
Subjective:   Patient ID:  Juan Castellanos is a 89 y.o. male who presents for follow-up of No chief complaint on file.  Pt is s/p hospitilization for SBE:  Hospital Course:   89 y/o male admitted with abdominal pain, LUQ and radiates across the top, exacerbated after he eats, with no improvement after taking rosa-seltzer. Troponin was bumped on admission and continued to trend up. Started on heparin gtt nomogram. Cr 1.9 (near his baseline of 1.6-1.9). Cardiology consulted for assistance. BC obtained on admission, growing strep, started on Rocephin IV (6/24), repeat BC 6/25, pending.  Echo EF 60%, grade II LV diastolic dysfunction, mild MR, TR with pulmonary hypertension. EKG S tachy.  6/26: cards on case. STARLA tomorrow. Repeat blood cultures NG. Cont rocephin.   6/27: STARLA positive for small highly mobile vegetation attached at the root of anterior mitral valve leaflet close to the aortic root. Repeat blood cultures negative. Increase rocephin to 2g q12h. Will discuss with ID on addition of gentamicin x 2 weeks. Picc line ordered. Will need 6 weeks IV abx therapy.   6/28: Patient stable for discharge on 6 weeks of Rocephin IV daily, No Gentamicin due to renal function per ID.  HH and infusion company set up.  Case dw Dr. Izquierdo.  Plan for DC with follow up with ID, cardiology and PCP.  POC dw patient and daughter at bedside.     Recent echo: 8-23  Left Ventricle: The left ventricle is normal in size. Mildly increased wall thickness. Normal wall motion. There is normal systolic function with a visually estimated ejection fraction of 55 - 70%. There is normal diastolic function.    Right Ventricle: Normal right ventricular cavity size. Wall thickness is normal. Right ventricle wall motion  is normal. Systolic function is normal.    Aortic Valve: There is a bioprosthetic valve in the aortic position that is well-seated. There is mild aortic valve sclerosis. No annular dilation. Aortic valve area by VTI is 0.95 cm². Aortic  valve peak velocity is 3.23 m/s. Mean gradient is 24 mmHg. The dimensionless index is 0.32.    Mitral Valve: There is mild bileaflet sclerosis. Moderately calcified posterior leaflet. No annular dilation. There is a possible small fixed irregular mass on the anterior leaflet. There is moderate regurgitation.    Tricuspid Valve: There is mild regurgitation with a centrally directed jet.    IVC/SVC: Normal venous pressure at 3 mmHg.      ID recommend finishing IV antibiotics, repeat echo    Patient denies CP, angina or anginal equivalent.     Hyperlipidemia  This is a chronic problem. The current episode started more than 1 year ago. The problem is controlled. Pertinent negatives include no chest pain or shortness of breath. Current antihyperlipidemic treatment includes statins. The current treatment provides moderate improvement of lipids. There are no compliance problems.    Hypertension  This is a chronic problem. The current episode started more than 1 year ago. The problem has been gradually improving since onset. The problem is controlled. Pertinent negatives include no chest pain, palpitations or shortness of breath. Past treatments include angiotensin blockers and diuretics. The current treatment provides moderate improvement. There are no compliance problems.        Review of Systems   Constitutional: Negative. Negative for weight gain.   HENT: Negative.     Eyes: Negative.    Cardiovascular: Negative.  Negative for chest pain, leg swelling and palpitations.   Respiratory: Negative.  Negative for shortness of breath.    Endocrine: Negative.    Hematologic/Lymphatic: Negative.    Skin: Negative.    Musculoskeletal:  Negative for muscle weakness.   Gastrointestinal: Negative.    Genitourinary: Negative.    Neurological: Negative.  Negative for dizziness.   Psychiatric/Behavioral: Negative.     Allergic/Immunologic: Negative.    All other systems reviewed and are negative.    Family History   Problem Relation  Age of Onset    Cataracts Father     Diabetes Brother      Past Medical History:   Diagnosis Date    Allergy     Anxiety     Arthritis     Blood transfusion     Cataract     Depression     GERD (gastroesophageal reflux disease)     Heart murmur     Hypercholesterolemia     Primary hypertension 11/24/2021    Prostate cancer     Thyroid disease      Social History     Socioeconomic History    Marital status:    Tobacco Use    Smoking status: Former     Current packs/day: 0.00    Smokeless tobacco: Never    Tobacco comments:     quit 40 years ago   Substance and Sexual Activity    Alcohol use: No    Drug use: No    Sexual activity: Not Currently     Current Outpatient Medications on File Prior to Visit   Medication Sig Dispense Refill    aspirin (ECOTRIN) 81 MG EC tablet Take 81 mg by mouth once daily.      cephALEXin (KEFLEX) 500 MG capsule Take 1 capsule (500 mg total) by mouth every 12 (twelve) hours. 60 capsule 4    cholecalciferol, vitamin D3, 1,000 unit capsule Take 1,000 Units by mouth 2 (two) times daily.      clopidogreL (PLAVIX) 75 mg tablet Take 1 tablet (75 mg total) by mouth once daily. 30 tablet 0    esomeprazole (NEXIUM) 40 MG capsule Take 1 capsule (40 mg total) by mouth before dinner. 30 capsule 0    ferrous gluconate (FERGON) 324 MG tablet Take 324 mg by mouth daily with breakfast.      fluticasone (FLONASE) 50 mcg/actuation nasal spray 1 spray by Each Nare route once daily.      hydroCHLOROthiazide (HYDRODIURIL) 12.5 MG Tab Take 1 tablet (12.5 mg total) by mouth once daily. 30 tablet 11    hydrocortisone 2.5 % cream Apply topically 2 (two) times daily.      levothyroxine (SYNTHROID) 112 MCG tablet Take 1 tablet by mouth every morning.      MULTIVIT-IRON-MIN-FOLIC ACID 3,500-18-0.4 UNIT-MG-MG ORAL CHEW Take 1 tablet by mouth once daily.      pantoprazole (PROTONIX) 40 MG tablet Take 1 tablet by mouth every morning.      rosuvastatin (CRESTOR) 20 MG tablet Take 20 mg by mouth every evening.       tramadol (ULTRAM) 50 mg tablet Take 50 mg by mouth every 6 (six) hours as needed.       No current facility-administered medications on file prior to visit.     Review of patient's allergies indicates:   Allergen Reactions    Nsaids (non-steroidal anti-inflammatory drug)      Kidney disease    Tomato (solanum lycopersicum)     Grape Rash    Orange Rash       Objective:     Physical Exam  Vitals and nursing note reviewed.   Constitutional:       Appearance: He is well-developed.   HENT:      Head: Normocephalic and atraumatic.   Eyes:      Conjunctiva/sclera: Conjunctivae normal.      Pupils: Pupils are equal, round, and reactive to light.   Cardiovascular:      Rate and Rhythm: Normal rate and regular rhythm.      Pulses: Intact distal pulses.      Heart sounds: Normal heart sounds.   Pulmonary:      Effort: Pulmonary effort is normal.      Breath sounds: Normal breath sounds.   Abdominal:      General: Bowel sounds are normal.      Palpations: Abdomen is soft.   Musculoskeletal:      Cervical back: Normal range of motion and neck supple.   Skin:     General: Skin is warm and dry.   Neurological:      Mental Status: He is alert and oriented to person, place, and time.         Assessment:     1. S/P AVR (aortic valve replacement)    2. Pure hypercholesterolemia    3. Primary hypertension    4. NSTEMI (non-ST elevated myocardial infarction)    5. Streptococcal endocarditis    6. MAGO (acute kidney injury)    7. Epigastric pain        Plan:     S/P AVR (aortic valve replacement)    Pure hypercholesterolemia    Primary hypertension    NSTEMI (non-ST elevated myocardial infarction)    Streptococcal endocarditis    MAGO (acute kidney injury)    Epigastric pain      Continue asa, plavix - primary prvention  Continue statin-hlp  Continue  hctz-htn/edema

## 2023-09-25 PROBLEM — I21.4 NSTEMI (NON-ST ELEVATED MYOCARDIAL INFARCTION): Status: RESOLVED | Noted: 2023-06-23 | Resolved: 2023-09-25

## 2023-09-25 PROBLEM — N17.9 AKI (ACUTE KIDNEY INJURY): Status: RESOLVED | Noted: 2023-06-23 | Resolved: 2023-09-25

## 2023-10-20 ENCOUNTER — OFFICE VISIT (OUTPATIENT)
Dept: CARDIOLOGY | Facility: CLINIC | Age: 88
End: 2023-10-20
Payer: MEDICARE

## 2023-10-20 VITALS
DIASTOLIC BLOOD PRESSURE: 60 MMHG | BODY MASS INDEX: 21.54 KG/M2 | WEIGHT: 150.13 LBS | OXYGEN SATURATION: 97 % | SYSTOLIC BLOOD PRESSURE: 121 MMHG | HEART RATE: 64 BPM

## 2023-10-20 DIAGNOSIS — R00.2 PALPITATIONS: ICD-10-CM

## 2023-10-20 DIAGNOSIS — E78.00 PURE HYPERCHOLESTEROLEMIA: ICD-10-CM

## 2023-10-20 DIAGNOSIS — I10 PRIMARY HYPERTENSION: ICD-10-CM

## 2023-10-20 DIAGNOSIS — Z95.2 S/P AVR (AORTIC VALVE REPLACEMENT): Primary | ICD-10-CM

## 2023-10-20 DIAGNOSIS — K21.00 GASTROESOPHAGEAL REFLUX DISEASE WITH ESOPHAGITIS WITHOUT HEMORRHAGE: ICD-10-CM

## 2023-10-20 PROCEDURE — 1101F PT FALLS ASSESS-DOCD LE1/YR: CPT | Mod: CPTII,S$GLB,, | Performed by: INTERNAL MEDICINE

## 2023-10-20 PROCEDURE — 99214 OFFICE O/P EST MOD 30 MIN: CPT | Mod: S$GLB,,, | Performed by: INTERNAL MEDICINE

## 2023-10-20 PROCEDURE — 3288F FALL RISK ASSESSMENT DOCD: CPT | Mod: CPTII,S$GLB,, | Performed by: INTERNAL MEDICINE

## 2023-10-20 PROCEDURE — 3288F PR FALLS RISK ASSESSMENT DOCUMENTED: ICD-10-PCS | Mod: CPTII,S$GLB,, | Performed by: INTERNAL MEDICINE

## 2023-10-20 PROCEDURE — 1159F MED LIST DOCD IN RCRD: CPT | Mod: CPTII,S$GLB,, | Performed by: INTERNAL MEDICINE

## 2023-10-20 PROCEDURE — 99214 PR OFFICE/OUTPT VISIT, EST, LEVL IV, 30-39 MIN: ICD-10-PCS | Mod: S$GLB,,, | Performed by: INTERNAL MEDICINE

## 2023-10-20 PROCEDURE — 1160F PR REVIEW ALL MEDS BY PRESCRIBER/CLIN PHARMACIST DOCUMENTED: ICD-10-PCS | Mod: CPTII,S$GLB,, | Performed by: INTERNAL MEDICINE

## 2023-10-20 PROCEDURE — 99999 PR PBB SHADOW E&M-EST. PATIENT-LVL III: ICD-10-PCS | Mod: PBBFAC,,, | Performed by: INTERNAL MEDICINE

## 2023-10-20 PROCEDURE — 1159F PR MEDICATION LIST DOCUMENTED IN MEDICAL RECORD: ICD-10-PCS | Mod: CPTII,S$GLB,, | Performed by: INTERNAL MEDICINE

## 2023-10-20 PROCEDURE — 1101F PR PT FALLS ASSESS DOC 0-1 FALLS W/OUT INJ PAST YR: ICD-10-PCS | Mod: CPTII,S$GLB,, | Performed by: INTERNAL MEDICINE

## 2023-10-20 PROCEDURE — 99999 PR PBB SHADOW E&M-EST. PATIENT-LVL III: CPT | Mod: PBBFAC,,, | Performed by: INTERNAL MEDICINE

## 2023-10-20 PROCEDURE — 1160F RVW MEDS BY RX/DR IN RCRD: CPT | Mod: CPTII,S$GLB,, | Performed by: INTERNAL MEDICINE

## 2023-10-20 NOTE — PROGRESS NOTES
Subjective:   Patient ID:  Juan Castellanos is a 89 y.o. male who presents for follow-up of No chief complaint on file.  Pt with high - 150, feels palpitations. Sx started 1 month ago  Echo nml AVR healing SBE  Patient denies CP, angina or anginal equivalent.   S/p 6 weeks IV antibiotics  Hyperlipidemia  This is a chronic problem. The current episode started more than 1 year ago. The problem is controlled. Pertinent negatives include no chest pain or shortness of breath. Current antihyperlipidemic treatment includes statins. The current treatment provides moderate improvement of lipids. There are no compliance problems.    Hypertension  This is a chronic problem. The current episode started more than 1 year ago. The problem has been gradually improving since onset. The problem is controlled. Associated symptoms include palpitations. Pertinent negatives include no chest pain or shortness of breath. Past treatments include angiotensin blockers and diuretics. The current treatment provides moderate improvement. There are no compliance problems.    Palpitations   This is a new problem. The current episode started 1 to 4 weeks ago. The problem occurs intermittently. The problem has been waxing and waning. Nothing aggravates the symptoms. Pertinent negatives include no chest pain, dizziness or shortness of breath. He has tried nothing for the symptoms. The treatment provided mild relief. His past medical history is significant for a valve disorder.       Review of Systems   Constitutional: Negative. Negative for weight gain.   HENT: Negative.     Eyes: Negative.    Cardiovascular:  Positive for palpitations. Negative for chest pain and leg swelling.   Respiratory: Negative.  Negative for shortness of breath.    Endocrine: Negative.    Hematologic/Lymphatic: Negative.    Skin: Negative.    Musculoskeletal:  Negative for muscle weakness.   Gastrointestinal: Negative.    Genitourinary: Negative.    Neurological:  Negative.  Negative for dizziness.   Psychiatric/Behavioral: Negative.     Allergic/Immunologic: Negative.    All other systems reviewed and are negative.    Family History   Problem Relation Age of Onset    Cataracts Father     Diabetes Brother      Past Medical History:   Diagnosis Date    Allergy     Anxiety     Arthritis     Blood transfusion     Cataract     Depression     GERD (gastroesophageal reflux disease)     Heart murmur     Hypercholesterolemia     Primary hypertension 11/24/2021    Prostate cancer     Thyroid disease      Social History     Socioeconomic History    Marital status:    Tobacco Use    Smoking status: Former    Smokeless tobacco: Never    Tobacco comments:     quit 40 years ago   Substance and Sexual Activity    Alcohol use: No    Drug use: No    Sexual activity: Not Currently     Current Outpatient Medications on File Prior to Visit   Medication Sig Dispense Refill    aspirin (ECOTRIN) 81 MG EC tablet Take 81 mg by mouth once daily.      cephALEXin (KEFLEX) 500 MG capsule Take 1 capsule (500 mg total) by mouth every 12 (twelve) hours. 60 capsule 4    cholecalciferol, vitamin D3, 1,000 unit capsule Take 1,000 Units by mouth 2 (two) times daily.      clopidogreL (PLAVIX) 75 mg tablet Take 1 tablet (75 mg total) by mouth once daily. 30 tablet 0    esomeprazole (NEXIUM) 40 MG capsule Take 1 capsule (40 mg total) by mouth before dinner. 30 capsule 0    ferrous gluconate (FERGON) 324 MG tablet Take 324 mg by mouth daily with breakfast.      fluticasone (FLONASE) 50 mcg/actuation nasal spray 1 spray by Each Nare route once daily.      hydroCHLOROthiazide (HYDRODIURIL) 12.5 MG Tab Take 1 tablet (12.5 mg total) by mouth once daily. 30 tablet 11    hydrocortisone 2.5 % cream Apply topically 2 (two) times daily.      levothyroxine (SYNTHROID) 112 MCG tablet Take 1 tablet by mouth every morning.      MULTIVIT-IRON-MIN-FOLIC ACID 3,500-18-0.4 UNIT-MG-MG ORAL CHEW Take 1 tablet by mouth once  daily.      rosuvastatin (CRESTOR) 20 MG tablet Take 20 mg by mouth every evening.      tramadol (ULTRAM) 50 mg tablet Take 50 mg by mouth every 6 (six) hours as needed.      pantoprazole (PROTONIX) 40 MG tablet Take 1 tablet by mouth every morning.       No current facility-administered medications on file prior to visit.     Review of patient's allergies indicates:   Allergen Reactions    Nsaids (non-steroidal anti-inflammatory drug)      Kidney disease    Tomato (solanum lycopersicum)     Grape Rash    Orange Rash       Objective:     Physical Exam  Vitals and nursing note reviewed.   Constitutional:       Appearance: He is well-developed.   HENT:      Head: Normocephalic and atraumatic.   Eyes:      Conjunctiva/sclera: Conjunctivae normal.      Pupils: Pupils are equal, round, and reactive to light.   Cardiovascular:      Rate and Rhythm: Normal rate and regular rhythm.      Pulses: Intact distal pulses.      Heart sounds: Normal heart sounds. Murmur: 2/6 HSM at base.   Pulmonary:      Effort: Pulmonary effort is normal.      Breath sounds: Normal breath sounds.   Abdominal:      General: Bowel sounds are normal.      Palpations: Abdomen is soft.   Musculoskeletal:      Cervical back: Normal range of motion and neck supple.   Skin:     General: Skin is warm and dry.   Neurological:      Mental Status: He is alert and oriented to person, place, and time.         Assessment:     1. S/P AVR (aortic valve replacement)    2. Pure hypercholesterolemia    3. Primary hypertension    4. Gastroesophageal reflux disease with esophagitis without hemorrhage        Plan:     S/P AVR (aortic valve replacement)    Pure hypercholesterolemia    Primary hypertension    Gastroesophageal reflux disease with esophagitis without hemorrhage      Continue asa, plavix - primary prvention  Continue statin-hlp  Continue  hctz-htn/edema      cardiac monitor

## 2023-10-27 ENCOUNTER — HOSPITAL ENCOUNTER (OUTPATIENT)
Dept: CARDIOLOGY | Facility: HOSPITAL | Age: 88
Discharge: HOME | End: 2023-10-27
Attending: INTERNAL MEDICINE
Payer: MEDICARE

## 2023-10-27 DIAGNOSIS — R00.2 PALPITATIONS: ICD-10-CM

## 2023-10-27 PROCEDURE — 93244 EXT ECG>48HR<7D REV&INTERPJ: CPT | Mod: ,,, | Performed by: INTERNAL MEDICINE

## 2023-10-27 PROCEDURE — 93244 CV CARDIAC MONITOR - 3-15 DAY ADULT (CUPID ONLY): ICD-10-PCS | Mod: ,,, | Performed by: INTERNAL MEDICINE

## 2023-11-09 LAB
OHS CV HOLTER SINUS AVERAGE HR: 66
OHS CV HOLTER SINUS MAX HR: 130
OHS CV HOLTER SINUS MIN HR: 49

## 2023-11-10 ENCOUNTER — TELEPHONE (OUTPATIENT)
Dept: CARDIOLOGY | Facility: CLINIC | Age: 88
End: 2023-11-10
Payer: MEDICARE

## 2023-11-10 NOTE — TELEPHONE ENCOUNTER
Attempted without success to contact pt to discuss monitor results. Left voicemail for pt to call back to discuss.      ----- Message from Dallin Bueno MD sent at 11/10/2023  6:08 AM CST -----  Cardiac monitor shows nonsustained SVT  [facility]

## 2024-03-04 ENCOUNTER — HOSPITAL ENCOUNTER (OUTPATIENT)
Dept: CARDIOLOGY | Facility: HOSPITAL | Age: 89
Discharge: HOME | End: 2024-03-04
Attending: INTERNAL MEDICINE
Payer: MEDICARE

## 2024-03-04 ENCOUNTER — OFFICE VISIT (OUTPATIENT)
Dept: CARDIOLOGY | Facility: CLINIC | Age: 89
End: 2024-03-04
Payer: MEDICARE

## 2024-03-04 VITALS
HEIGHT: 70 IN | SYSTOLIC BLOOD PRESSURE: 130 MMHG | WEIGHT: 154.56 LBS | OXYGEN SATURATION: 97 % | BODY MASS INDEX: 22.13 KG/M2 | HEART RATE: 65 BPM | DIASTOLIC BLOOD PRESSURE: 60 MMHG | RESPIRATION RATE: 16 BRPM

## 2024-03-04 DIAGNOSIS — R00.2 PALPITATIONS: ICD-10-CM

## 2024-03-04 DIAGNOSIS — E78.00 PURE HYPERCHOLESTEROLEMIA: ICD-10-CM

## 2024-03-04 DIAGNOSIS — I50.32 CHRONIC DIASTOLIC HEART FAILURE: Primary | ICD-10-CM

## 2024-03-04 DIAGNOSIS — I10 PRIMARY HYPERTENSION: ICD-10-CM

## 2024-03-04 DIAGNOSIS — Z95.2 S/P AVR (AORTIC VALVE REPLACEMENT): ICD-10-CM

## 2024-03-04 DIAGNOSIS — R00.2 PALPITATIONS: Primary | ICD-10-CM

## 2024-03-04 DIAGNOSIS — K21.00 GASTROESOPHAGEAL REFLUX DISEASE WITH ESOPHAGITIS WITHOUT HEMORRHAGE: ICD-10-CM

## 2024-03-04 LAB
OHS QRS DURATION: 96 MS
OHS QTC CALCULATION: 431 MS

## 2024-03-04 PROCEDURE — 99214 OFFICE O/P EST MOD 30 MIN: CPT | Mod: S$GLB,,, | Performed by: INTERNAL MEDICINE

## 2024-03-04 PROCEDURE — 99999 PR PBB SHADOW E&M-EST. PATIENT-LVL IV: CPT | Mod: PBBFAC,,, | Performed by: INTERNAL MEDICINE

## 2024-03-04 PROCEDURE — 3288F FALL RISK ASSESSMENT DOCD: CPT | Mod: CPTII,S$GLB,, | Performed by: INTERNAL MEDICINE

## 2024-03-04 PROCEDURE — 93005 ELECTROCARDIOGRAM TRACING: CPT

## 2024-03-04 PROCEDURE — 93010 ELECTROCARDIOGRAM REPORT: CPT | Mod: ,,, | Performed by: INTERNAL MEDICINE

## 2024-03-04 PROCEDURE — 1101F PT FALLS ASSESS-DOCD LE1/YR: CPT | Mod: CPTII,S$GLB,, | Performed by: INTERNAL MEDICINE

## 2024-03-04 PROCEDURE — 1159F MED LIST DOCD IN RCRD: CPT | Mod: CPTII,S$GLB,, | Performed by: INTERNAL MEDICINE

## 2024-03-04 NOTE — PROGRESS NOTES
Subjective:   Patient ID:  Juan Castellanos is a 89 y.o. male who presents for follow-up of No chief complaint on file.  Cardiac monitor:    The predominant rhythm is sinus.     The patient was monitored for a total of 6d 23h, underlying rhythm is Sinus.  The minimum heart rate was 49 bpm; the maximum 130 bpm; the average 66 bpm.  0 % of Atrial fibrillation/Atrial flutter with longest episode of 0 ms.  The total burden of AV Block present was 0 % [Complete Heart Block: 0 %; Advanced (High Grade):  0 %; 2nd Degree, Mobitz II: 0 %; 2nd Degree, Mobitz I: 0 %].  There were 0 pauses, the longest pause was 0 ms at --.  Total count of Ventricular Tachycardia (VT): 1 episode(s). Longest VT: 5 beats on Day 3 / 09:09:21  am. Fastest VT: 150 bpm on  Day 3 / 09:09:21 am.  1092 supraventricular episodes were found. Longest SVT Episode 49 beats, Fastest  bpm  There were a total of 61 PVCs with 2 morphologies and 1 couplets. Overall PVC [place] at < 0.01 %  There were a total of 0 Other Beats. There were 0 total number of paced beats.  There were a total of 40182 PSVCs with 1 morphologies and 5202 couplets. Overall PSVC [place] at  5.02 %  There is a total of 0 patient events.    B blockers started with resolution of palpitations    Hyperlipidemia  This is a chronic problem. The current episode started more than 1 year ago. The problem is controlled. Pertinent negatives include no chest pain or shortness of breath. Current antihyperlipidemic treatment includes statins. The current treatment provides moderate improvement of lipids. There are no compliance problems.    Hypertension  This is a chronic problem. The current episode started more than 1 year ago. The problem has been gradually improving since onset. The problem is controlled. Associated symptoms include palpitations. Pertinent negatives include no chest pain or shortness of breath. Past treatments include angiotensin blockers and diuretics. The current treatment  provides moderate improvement. There are no compliance problems.    Palpitations   This is a new problem. The current episode started 1 to 4 weeks ago. The problem occurs intermittently. The problem has been waxing and waning. Nothing aggravates the symptoms. Pertinent negatives include no chest pain, dizziness or shortness of breath. He has tried nothing for the symptoms. The treatment provided mild relief. His past medical history is significant for a valve disorder.       Review of Systems   Constitutional: Negative. Negative for weight gain.   HENT: Negative.     Eyes: Negative.    Cardiovascular:  Positive for palpitations. Negative for chest pain and leg swelling.   Respiratory: Negative.  Negative for shortness of breath.    Endocrine: Negative.    Hematologic/Lymphatic: Negative.    Skin: Negative.    Musculoskeletal:  Negative for muscle weakness.   Gastrointestinal: Negative.    Genitourinary: Negative.    Neurological: Negative.  Negative for dizziness.   Psychiatric/Behavioral: Negative.     Allergic/Immunologic: Negative.    All other systems reviewed and are negative.    Family History   Problem Relation Age of Onset    Cataracts Father     Diabetes Brother      Past Medical History:   Diagnosis Date    Allergy     Anxiety     Arthritis     Blood transfusion     Cataract     Depression     GERD (gastroesophageal reflux disease)     Heart murmur     Hypercholesterolemia     Primary hypertension 11/24/2021    Prostate cancer     Thyroid disease      Social History     Socioeconomic History    Marital status:    Tobacco Use    Smoking status: Former    Smokeless tobacco: Never    Tobacco comments:     quit 40 years ago   Substance and Sexual Activity    Alcohol use: No    Drug use: No    Sexual activity: Not Currently     Current Outpatient Medications on File Prior to Visit   Medication Sig Dispense Refill    aspirin (ECOTRIN) 81 MG EC tablet Take 81 mg by mouth once daily.      cholecalciferol,  vitamin D3, 1,000 unit capsule Take 1,000 Units by mouth 2 (two) times daily.      clopidogreL (PLAVIX) 75 mg tablet Take 1 tablet (75 mg total) by mouth once daily. 30 tablet 0    esomeprazole (NEXIUM) 40 MG capsule Take 1 capsule (40 mg total) by mouth before dinner. 30 capsule 0    ferrous gluconate (FERGON) 324 MG tablet Take 324 mg by mouth daily with breakfast.      fluticasone (FLONASE) 50 mcg/actuation nasal spray 1 spray by Each Nare route once daily.      hydroCHLOROthiazide (HYDRODIURIL) 12.5 MG Tab Take 1 tablet (12.5 mg total) by mouth once daily. 30 tablet 11    hydrocortisone 2.5 % cream Apply topically 2 (two) times daily.      levothyroxine (SYNTHROID) 112 MCG tablet Take 1 tablet by mouth every morning.      metoprolol succinate (TOPROL-XL) 25 MG 24 hr tablet Take 1 tablet (25 mg total) by mouth once daily. 30 tablet 11    MULTIVIT-IRON-MIN-FOLIC ACID 3,500-18-0.4 UNIT-MG-MG ORAL CHEW Take 1 tablet by mouth once daily.      pantoprazole (PROTONIX) 40 MG tablet Take 1 tablet by mouth every morning.      rosuvastatin (CRESTOR) 20 MG tablet Take 20 mg by mouth every evening.      tramadol (ULTRAM) 50 mg tablet Take 50 mg by mouth every 6 (six) hours as needed.       No current facility-administered medications on file prior to visit.     Review of patient's allergies indicates:   Allergen Reactions    Nsaids (non-steroidal anti-inflammatory drug)      Kidney disease    Tomato (solanum lycopersicum)     Grape Rash    Orange Rash       Objective:     Physical Exam  Vitals and nursing note reviewed.   Constitutional:       Appearance: He is well-developed.   HENT:      Head: Normocephalic and atraumatic.   Eyes:      Conjunctiva/sclera: Conjunctivae normal.      Pupils: Pupils are equal, round, and reactive to light.   Cardiovascular:      Rate and Rhythm: Normal rate and regular rhythm.      Pulses: Intact distal pulses.      Heart sounds: Normal heart sounds.   Pulmonary:      Effort: Pulmonary effort  is normal.      Breath sounds: Normal breath sounds.   Abdominal:      General: Bowel sounds are normal.      Palpations: Abdomen is soft.   Musculoskeletal:      Cervical back: Normal range of motion and neck supple.   Skin:     General: Skin is warm and dry.   Neurological:      Mental Status: He is alert and oriented to person, place, and time.         Assessment:     1. Chronic diastolic heart failure    2. Pure hypercholesterolemia    3. Primary hypertension    4. S/P AVR (aortic valve replacement)    5. Gastroesophageal reflux disease with esophagitis without hemorrhage        Plan:     Chronic diastolic heart failure    Pure hypercholesterolemia    Primary hypertension    S/P AVR (aortic valve replacement)    Gastroesophageal reflux disease with esophagitis without hemorrhage      Continue asa, plavix - primary prvention  Continue statin-hlp  Continue  hctz-htn/edema  Continue toprol- nonsustained VT and SVT

## 2024-06-12 RX ORDER — HYDROCHLOROTHIAZIDE 12.5 MG/1
12.5 TABLET ORAL DAILY
Qty: 30 TABLET | Refills: 11 | Status: SHIPPED | OUTPATIENT
Start: 2024-06-12 | End: 2025-06-12

## 2024-09-13 ENCOUNTER — OFFICE VISIT (OUTPATIENT)
Dept: CARDIOLOGY | Facility: CLINIC | Age: 89
End: 2024-09-13
Payer: MEDICARE

## 2024-09-13 VITALS
SYSTOLIC BLOOD PRESSURE: 138 MMHG | HEART RATE: 56 BPM | BODY MASS INDEX: 22.6 KG/M2 | HEIGHT: 70 IN | OXYGEN SATURATION: 98 % | WEIGHT: 157.88 LBS | DIASTOLIC BLOOD PRESSURE: 88 MMHG

## 2024-09-13 DIAGNOSIS — K21.00 GASTROESOPHAGEAL REFLUX DISEASE WITH ESOPHAGITIS WITHOUT HEMORRHAGE: ICD-10-CM

## 2024-09-13 DIAGNOSIS — Z95.2 S/P AVR (AORTIC VALVE REPLACEMENT): ICD-10-CM

## 2024-09-13 DIAGNOSIS — I50.32 CHRONIC DIASTOLIC HEART FAILURE: Primary | ICD-10-CM

## 2024-09-13 DIAGNOSIS — E78.00 PURE HYPERCHOLESTEROLEMIA: ICD-10-CM

## 2024-09-13 DIAGNOSIS — I10 PRIMARY HYPERTENSION: ICD-10-CM

## 2024-09-13 PROCEDURE — 99999 PR PBB SHADOW E&M-EST. PATIENT-LVL III: CPT | Mod: PBBFAC,,, | Performed by: INTERNAL MEDICINE

## 2024-09-13 NOTE — PROGRESS NOTES
Subjective:   Patient ID:  Juan Castellanos is a 89 y.o. male who presents for follow-up of Follow-up  Patient denies CP, angina or anginal equivalent.  Pt active somewhat.  Hyperlipidemia  This is a chronic problem. The current episode started more than 1 year ago. The problem is controlled. Pertinent negatives include no chest pain or shortness of breath. Current antihyperlipidemic treatment includes statins. The current treatment provides moderate improvement of lipids. There are no compliance problems.    Hypertension  This is a chronic problem. The current episode started more than 1 year ago. The problem has been gradually improving since onset. The problem is controlled. Associated symptoms include palpitations. Pertinent negatives include no chest pain or shortness of breath. Past treatments include angiotensin blockers and diuretics. The current treatment provides moderate improvement. There are no compliance problems.    Palpitations   This is a new problem. The current episode started 1 to 4 weeks ago. The problem occurs intermittently. The problem has been waxing and waning. Nothing aggravates the symptoms. Pertinent negatives include no chest pain, dizziness or shortness of breath. He has tried nothing for the symptoms. The treatment provided mild relief. His past medical history is significant for a valve disorder.       Review of Systems   Constitutional: Negative. Negative for weight gain.   HENT: Negative.     Eyes: Negative.    Cardiovascular:  Positive for palpitations. Negative for chest pain and leg swelling.   Respiratory: Negative.  Negative for shortness of breath.    Endocrine: Negative.    Hematologic/Lymphatic: Negative.    Skin: Negative.    Musculoskeletal:  Negative for muscle weakness.   Gastrointestinal: Negative.    Genitourinary: Negative.    Neurological: Negative.  Negative for dizziness.   Psychiatric/Behavioral: Negative.     Allergic/Immunologic: Negative.    All other systems  reviewed and are negative.    Family History   Problem Relation Name Age of Onset    Cataracts Father      Diabetes Brother       Past Medical History:   Diagnosis Date    Allergy     Anxiety     Arthritis     Blood transfusion     Cataract     Depression     GERD (gastroesophageal reflux disease)     Heart murmur     Hypercholesterolemia     Primary hypertension 11/24/2021    Prostate cancer     Thyroid disease      Social History     Socioeconomic History    Marital status:    Tobacco Use    Smoking status: Former    Smokeless tobacco: Never    Tobacco comments:     quit 40 years ago   Substance and Sexual Activity    Alcohol use: No    Drug use: No    Sexual activity: Not Currently     Social Determinants of Health     Financial Resource Strain: Low Risk  (7/9/2023)    [facility], [facility]sidi[facility] and Aff[facility]ates    Overall Financial Resource Strain (CARDIA)     Difficulty of Paying Living Expenses: Not very hard   Food Insecurity: No Food Insecurity (7/9/2023)    Received from [facility] Aff[facility]ates, [facility]idi[facility] and Affiliates    Hunger Vital Sign     Worried About Running Out of Food in the Last Year: Never true     Ran Out of Food in the Last Year: Never true   Transportation Needs: Patient Declined (7/9/2023)    [facility], [facility]    PRAPARE - Transportation     Lack of Transportation (Medical): Patient declined     Lack of Transportation (Non-Medical): Patient declined   Physical Activity: Inactive (7/9/2023)    Received from [facility]  and Its Subsidiaries and Affiliates, [facility] and Its Subsidiaries and Affiliates    Exercise Vital Sign     Days of Exercise per Week: 0 days     Minutes of Exercise per Session: 0 min   Stress: Stress Concern Present (7/9/2023)    [facility], [facility] and Affiliates    [employer] Occupational [employer] - Occupational Stress Questionnaire     Feeling of Stress : To some extent   Housing Stability: Unknown (7/9/2023)    [facility], [facility]idi[facility] and Affiliates    Housing Stability Vital Sign     Unable to Pay for Housing in the Last Year: Patient refused     Number of Places Lived in the Last Year: 1     Unstable Housing in the Last Year: No     Current Outpatient Medications on File Prior to Visit   Medication Sig Dispense Refill    aspirin (ECOTRIN) 81 MG EC tablet Take 81 mg by mouth once daily.      cholecalciferol, vitamin D3, 1,000 unit capsule Take 1,000 Units by mouth 2 (two) times daily.      clopidogreL (PLAVIX) 75 mg tablet Take 1 tablet (75 mg total) by mouth once daily. 30 tablet 0    esomeprazole (NEXIUM) 40 MG capsule Take 1 capsule (40 mg total) by mouth before dinner. 30 capsule 0    ferrous gluconate (FERGON) 324 MG tablet Take 324 mg by mouth daily with breakfast.      fluticasone (FLONASE) 50 mcg/actuation nasal spray 1 spray by Each Nare route once daily.      hydroCHLOROthiazide (HYDRODIURIL) 12.5 MG Tab Take 1 tablet (12.5 mg total) by mouth once daily. 30 tablet 11    hydrocortisone 2.5 % cream Apply topically 2 (two) times daily.      levothyroxine (SYNTHROID) 112 MCG tablet Take 1 tablet by mouth every morning.      metoprolol succinate (TOPROL-XL) 25  MG 24 hr tablet Take 1 tablet (25 mg total) by mouth once daily. 30 tablet 11    MULTIVIT-IRON-MIN-FOLIC ACID 3,500-18-0.4 UNIT-MG-MG ORAL CHEW Take 1 tablet by mouth once daily.      pantoprazole (PROTONIX) 40 MG tablet Take 1 tablet by mouth every morning.      rosuvastatin (CRESTOR) 20 MG tablet Take 20 mg by mouth every evening.      tramadol (ULTRAM) 50 mg tablet Take 50 mg by mouth every 6 (six) hours as needed.       No current facility-administered medications on file prior to visit.     Review of patient's allergies indicates:   Allergen Reactions    Nsaids (non-steroidal anti-inflammatory drug)      Kidney disease    Tomato (solanum lycopersicum)     Grape Rash    Orange Rash       Objective:     Physical Exam  Vitals and nursing note reviewed.   Constitutional:       Appearance: He is well-developed.   HENT:      Head: Normocephalic and atraumatic.   Eyes:      Conjunctiva/sclera: Conjunctivae normal.      Pupils: Pupils are equal, round, and reactive to light.   Cardiovascular:      Rate and Rhythm: Normal rate and regular rhythm.      Pulses: Intact distal pulses.      Heart sounds: Normal heart sounds.   Pulmonary:      Effort: Pulmonary effort is normal.      Breath sounds: Normal breath sounds.   Abdominal:      General: Bowel sounds are normal.      Palpations: Abdomen is soft.   Musculoskeletal:      Cervical back: Normal range of motion and neck supple.   Skin:     General: Skin is warm and dry.   Neurological:      Mental Status: He is alert and oriented to person, place, and time.         Assessment:     1. Chronic diastolic heart failure    2. Pure hypercholesterolemia    3. Primary hypertension    4. S/P AVR (aortic valve replacement)    5. Gastroesophageal reflux disease with esophagitis without hemorrhage        Plan:     Chronic diastolic heart failure    Pure hypercholesterolemia    Primary hypertension    S/P AVR (aortic valve replacement)    Gastroesophageal reflux disease with  esophagitis without hemorrhage      Continue asa, plavix - primary prvention  Continue statin-hlp  Continue  hctz-htn/edema  Continue toprol- nonsustained VT and SVT

## 2024-10-11 RX ORDER — METOPROLOL SUCCINATE 25 MG/1
25 TABLET, EXTENDED RELEASE ORAL DAILY
Qty: 30 TABLET | Refills: 11 | Status: SHIPPED | OUTPATIENT
Start: 2024-10-11 | End: 2025-10-11

## 2025-03-07 ENCOUNTER — HOSPITAL ENCOUNTER (OUTPATIENT)
Dept: CARDIOLOGY | Facility: HOSPITAL | Age: OVER 89
Discharge: HOME | End: 2025-03-07
Attending: INTERNAL MEDICINE
Payer: MEDICARE

## 2025-03-07 ENCOUNTER — RESULTS FOLLOW-UP (OUTPATIENT)
Dept: CARDIOLOGY | Facility: CLINIC | Age: OVER 89
End: 2025-03-07

## 2025-03-07 VITALS
HEIGHT: 70 IN | BODY MASS INDEX: 22.48 KG/M2 | WEIGHT: 157 LBS | SYSTOLIC BLOOD PRESSURE: 138 MMHG | DIASTOLIC BLOOD PRESSURE: 88 MMHG

## 2025-03-07 DIAGNOSIS — Z95.2 S/P AVR (AORTIC VALVE REPLACEMENT): ICD-10-CM

## 2025-03-07 LAB
AV INDEX (PROSTH): 0.44
AV MEAN GRADIENT: 15 MMHG
AV PEAK GRADIENT: 25 MMHG
AV VALVE AREA BY VELOCITY RATIO: 1 CM²
AV VALVE AREA: 1.1 CM²
AV VELOCITY RATIO: 0.4
BSA FOR ECHO PROCEDURE: 1.88 M2
CV ECHO LV RWT: 0.45 CM
DOP CALC AO PEAK VEL: 2.5 M/S
DOP CALC AO VTI: 63.2 CM
DOP CALC LVOT AREA: 2.5 CM2
DOP CALC LVOT DIAMETER: 1.8 CM
DOP CALC LVOT PEAK VEL: 1 M/S
DOP CALC LVOT STROKE VOLUME: 70.2 CM3
DOP CALC RVOT PEAK VEL: 0.86 M/S
DOP CALC RVOT VTI: 23.8 CM
DOP CALCLVOT PEAK VEL VTI: 27.6 CM
E WAVE DECELERATION TIME: 357 MSEC
E/A RATIO: 1.32
E/E' RATIO: 17 M/S
ECHO LV POSTERIOR WALL: 1.2 CM (ref 0.6–1.1)
FRACTIONAL SHORTENING: 35.8 % (ref 28–44)
INTERVENTRICULAR SEPTUM: 1.1 CM (ref 0.6–1.1)
IVC DIAMETER: 1.87 CM
IVRT: 51 MSEC
LA MAJOR: 6.7 CM
LA MINOR: 5.9 CM
LA WIDTH: 4.4 CM
LEFT ATRIUM AREA SYSTOLIC (APICAL 2 CHAMBER): 28.25 CM2
LEFT ATRIUM AREA SYSTOLIC (APICAL 4 CHAMBER): 25.06 CM2
LEFT ATRIUM SIZE: 4.5 CM
LEFT ATRIUM VOLUME INDEX MOD: 49 ML/M2
LEFT ATRIUM VOLUME INDEX: 56 ML/M2
LEFT ATRIUM VOLUME MOD: 92 ML
LEFT ATRIUM VOLUME: 106 CM3
LEFT INTERNAL DIMENSION IN SYSTOLE: 3.4 CM (ref 2.1–4)
LEFT VENTRICLE DIASTOLIC VOLUME INDEX: 71.81 ML/M2
LEFT VENTRICLE DIASTOLIC VOLUME: 135 ML
LEFT VENTRICLE END SYSTOLIC VOLUME APICAL 2 CHAMBER: 105 ML
LEFT VENTRICLE END SYSTOLIC VOLUME APICAL 4 CHAMBER: 79.86 ML
LEFT VENTRICLE MASS INDEX: 128.7 G/M2
LEFT VENTRICLE SYSTOLIC VOLUME INDEX: 25 ML/M2
LEFT VENTRICLE SYSTOLIC VOLUME: 47 ML
LEFT VENTRICULAR INTERNAL DIMENSION IN DIASTOLE: 5.3 CM (ref 3.5–6)
LEFT VENTRICULAR MASS: 242 G
LV LATERAL E/E' RATIO: 14.2 M/S
LV SEPTAL E/E' RATIO: 21.3 M/S
LVED V (TEICH): 135.39 ML
LVES V (TEICH): 46.9 ML
LVOT MG: 2.42 MMHG
LVOT MV: 0.74 CM/S
MV PEAK A VEL: 0.97 M/S
MV PEAK E VEL: 1.28 M/S
MV STENOSIS PRESSURE HALF TIME: 103.41 MS
MV VALVE AREA P 1/2 METHOD: 2.13 CM2
OHS CV RV/LV RATIO: 0.72 CM
PISA TR MAX VEL: 2.7 M/S
PULM VEIN S/D RATIO: 0.72
PV MEAN GRADIENT: 2 MMHG
PV MV: 1.06 M/S
PV PEAK D VEL: 0.74 M/S
PV PEAK GRADIENT: 9 MMHG
PV PEAK S VEL: 0.53 M/S
PV PEAK VELOCITY: 1.47 M/S
RA MAJOR: 5.25 CM
RA PRESSURE ESTIMATED: 3 MMHG
RA WIDTH: 4.22 CM
RIGHT VENTRICLE DIASTOLIC BASEL DIMENSION: 3.8 CM
RIGHT VENTRICULAR END-DIASTOLIC DIMENSION: 3.82 CM
RV TB RVSP: 6 MMHG
SINUS: 2.83 CM
STJ: 2.94 CM
TDI LATERAL: 0.09 M/S
TDI SEPTAL: 0.06 M/S
TDI: 0.08 M/S
TR MAX PG: 29 MMHG
TRICUSPID ANNULAR PLANE SYSTOLIC EXCURSION: 1.62 CM
TV REST PULMONARY ARTERY PRESSURE: 32 MMHG
Z-SCORE OF LEFT VENTRICULAR DIMENSION IN END DIASTOLE: 0.09
Z-SCORE OF LEFT VENTRICULAR DIMENSION IN END SYSTOLE: 0.39

## 2025-03-07 PROCEDURE — 93306 TTE W/DOPPLER COMPLETE: CPT | Mod: 26,,, | Performed by: INTERNAL MEDICINE

## 2025-03-07 PROCEDURE — 93306 TTE W/DOPPLER COMPLETE: CPT

## 2025-03-14 ENCOUNTER — OFFICE VISIT (OUTPATIENT)
Dept: CARDIOLOGY | Facility: CLINIC | Age: OVER 89
End: 2025-03-14
Payer: MEDICARE

## 2025-03-14 VITALS
SYSTOLIC BLOOD PRESSURE: 120 MMHG | WEIGHT: 160.69 LBS | OXYGEN SATURATION: 94 % | RESPIRATION RATE: 16 BRPM | DIASTOLIC BLOOD PRESSURE: 66 MMHG | HEIGHT: 70 IN | HEART RATE: 68 BPM | BODY MASS INDEX: 23.01 KG/M2

## 2025-03-14 DIAGNOSIS — E78.2 MIXED HYPERLIPIDEMIA: ICD-10-CM

## 2025-03-14 DIAGNOSIS — I10 PRIMARY HYPERTENSION: ICD-10-CM

## 2025-03-14 DIAGNOSIS — K21.00 GASTROESOPHAGEAL REFLUX DISEASE WITH ESOPHAGITIS WITHOUT HEMORRHAGE: ICD-10-CM

## 2025-03-14 DIAGNOSIS — Z95.2 S/P AVR (AORTIC VALVE REPLACEMENT): Primary | ICD-10-CM

## 2025-03-14 DIAGNOSIS — I50.32 CHRONIC DIASTOLIC HEART FAILURE: ICD-10-CM

## 2025-03-14 PROCEDURE — 99999 PR PBB SHADOW E&M-EST. PATIENT-LVL IV: CPT | Mod: PBBFAC,,, | Performed by: INTERNAL MEDICINE

## 2025-03-14 NOTE — PROGRESS NOTES
Subjective:   Patient ID:  Juan Castellanos is a 89 y.o. male who presents for follow-up of No chief complaint on file.  Patient denies CP, angina or anginal equivalent.Pt somewhat active.  Echo 2025 nml EF, nml AVR    Hyperlipidemia  This is a chronic problem. The current episode started more than 1 year ago. The problem is controlled. Pertinent negatives include no chest pain or shortness of breath. Current antihyperlipidemic treatment includes statins. The current treatment provides moderate improvement of lipids. There are no compliance problems.    Hypertension  This is a chronic problem. The current episode started more than 1 year ago. The problem has been gradually improving since onset. The problem is controlled. Associated symptoms include palpitations. Pertinent negatives include no chest pain or shortness of breath. Past treatments include angiotensin blockers and diuretics. The current treatment provides moderate improvement. There are no compliance problems.    Palpitations   This is a new problem. The current episode started 1 to 4 weeks ago. The problem occurs intermittently. The problem has been waxing and waning. Nothing aggravates the symptoms. Pertinent negatives include no chest pain, dizziness or shortness of breath. He has tried nothing for the symptoms. The treatment provided mild relief. His past medical history is significant for a valve disorder.       Review of Systems   Constitutional: Negative. Negative for weight gain.   HENT: Negative.     Eyes: Negative.    Cardiovascular:  Positive for palpitations. Negative for chest pain and leg swelling.   Respiratory: Negative.  Negative for shortness of breath.    Endocrine: Negative.    Hematologic/Lymphatic: Negative.    Skin: Negative.    Musculoskeletal:  Negative for muscle weakness.   Gastrointestinal: Negative.    Genitourinary: Negative.    Neurological: Negative.  Negative for dizziness.   Psychiatric/Behavioral: Negative.      Allergic/Immunologic: Negative.    All other systems reviewed and are negative.    Family History   Problem Relation Name Age of Onset    Cataracts Father      Diabetes Brother       Past Medical History:   Diagnosis Date    Allergy     Anxiety     Arthritis     Blood transfusion     Cataract     Depression     GERD (gastroesophageal reflux disease)     Heart murmur     Hypercholesterolemia     Primary hypertension 11/24/2021    Prostate cancer     Thyroid disease      Social History[1]  Medications Ordered Prior to Encounter[2]  Review of patient's allergies indicates:   Allergen Reactions    Nsaids (non-steroidal anti-inflammatory drug)      Kidney disease    Tomato (solanum lycopersicum)     Grape Rash    Orange Rash       Objective:     Physical Exam  Vitals and nursing note reviewed.   Constitutional:       Appearance: He is well-developed.   HENT:      Head: Normocephalic and atraumatic.   Eyes:      Conjunctiva/sclera: Conjunctivae normal.      Pupils: Pupils are equal, round, and reactive to light.   Cardiovascular:      Rate and Rhythm: Normal rate and regular rhythm.      Pulses: Intact distal pulses.      Heart sounds: Normal heart sounds.   Pulmonary:      Effort: Pulmonary effort is normal.      Breath sounds: Normal breath sounds.   Abdominal:      General: Bowel sounds are normal.      Palpations: Abdomen is soft.   Musculoskeletal:      Cervical back: Normal range of motion and neck supple.   Skin:     General: Skin is warm and dry.   Neurological:      Mental Status: He is alert and oriented to person, place, and time.         Assessment:     1. S/P AVR (aortic valve replacement)    2. Chronic diastolic heart failure    3. Primary hypertension    4. Mixed hyperlipidemia    5. Gastroesophageal reflux disease with esophagitis without hemorrhage        Plan:     S/P AVR (aortic valve replacement)    Chronic diastolic heart failure    Primary hypertension    Mixed hyperlipidemia    Gastroesophageal  reflux disease with esophagitis without hemorrhage      Continue asa, plavix - primary prvention  Continue statin-hlp  Continue  hctz-htn/edema  Continue toprol- nonsustained VT and SVT         [1]   Social History  Socioeconomic History    Marital status:    Tobacco Use    Smoking status: Former    Smokeless tobacco: Never    Tobacco comments:     quit 40 years ago   Substance and Sexual Activity    Alcohol use: No    Drug use: No    Sexual activity: Not Currently     Social Drivers of Health     Financial Resource Strain: Low Risk  (7/9/2023)    [facility] and Affiliates    Overall Financial Resource Strain (CARDIA)     Difficulty of Paying Living Expenses: Not very hard   Food Insecurity: No Food Insecurity (7/9/2023)    Received from [facility] and Aff[facility]ates    Hunger Vital Sign     Worried About Running Out of Food in the Last Year: Never true     Ran Out of Food in the Last Year: Never true   Transportation Needs: Patient Declined (7/9/2023)    Received from [facility] and Aff[facility]ates    PRAPARE - Transportation     Lack of Transportation (Medical): Patient declined     Lack of Transportation (Non-Medical): Patient declined   Physical Activity: Inactive (7/9/2023)    Received from [facility] Aff[facility]    Exercise Vital Sign     Days of Exercise per Week: 0 days     Minutes of Exercise per Session: 0 min   Stress: Stress Concern Present (7/9/2023)    Received from [facility] Its Subsidiaries and Affiliates    [employer] - Occupational Stress Questionnaire     Feeling of Stress : To some extent   Housing Stability: Unknown (7/9/2023)    [facility]idiaries and Affiliates    Housing Stability Vital Sign     Unable to Pay for Housing in the Last Year: Patient refused     Number of Places Lived in the Last Year: 1     Unstable Housing in the Last Year: No   [2]   Current Outpatient Medications on File Prior to Visit   Medication Sig Dispense Refill    aspirin (ECOTRIN) 81 MG EC tablet Take 81 mg by mouth once daily.      cholecalciferol, vitamin D3, 1,000 unit capsule Take 1,000 Units by mouth 2 (two) times daily.      clopidogreL (PLAVIX) 75 mg tablet Take 1 tablet (75 mg total) by mouth once daily. 30 tablet 0    esomeprazole (NEXIUM) 40 MG capsule Take 1 capsule (40 mg total) by mouth before dinner. 30 capsule 0    ferrous gluconate (FERGON) 324 MG tablet Take 324 mg by mouth daily with breakfast.      fluticasone (FLONASE) 50 mcg/actuation nasal spray 1 spray by Each Nare route once daily.      hydroCHLOROthiazide (HYDRODIURIL) 12.5 MG Tab Take 1 tablet (12.5 mg total) by mouth once daily. 30 tablet 11    hydrocortisone 2.5 % cream Apply topically 2 (two) times daily.      levothyroxine (SYNTHROID) 112 MCG tablet Take 1 tablet by mouth every morning.      metoprolol succinate (TOPROL-XL) 25 MG 24 hr tablet Take 1 tablet (25 mg total) by mouth once daily. 30 tablet 11    MULTIVIT-IRON-MIN-FOLIC ACID 3,500-18-0.4 UNIT-MG-MG ORAL CHEW Take 1 tablet by mouth once daily.      pantoprazole (PROTONIX) 40 MG tablet Take 1 tablet by mouth every morning.      rosuvastatin (CRESTOR) 20 MG tablet Take 20 mg by mouth every evening.      tramadol (ULTRAM) 50 mg tablet Take 50 mg by mouth every 6 (six) hours as needed.       No current facility-administered medications on file prior to visit.

## 2025-06-05 RX ORDER — HYDROCHLOROTHIAZIDE 12.5 MG/1
12.5 TABLET ORAL
Qty: 30 TABLET | Refills: 11 | Status: SHIPPED | OUTPATIENT
Start: 2025-06-05